# Patient Record
Sex: MALE | Race: WHITE | NOT HISPANIC OR LATINO | Employment: UNEMPLOYED | URBAN - METROPOLITAN AREA
[De-identification: names, ages, dates, MRNs, and addresses within clinical notes are randomized per-mention and may not be internally consistent; named-entity substitution may affect disease eponyms.]

---

## 2017-01-03 ENCOUNTER — COMMUNICATION - HEALTHEAST (OUTPATIENT)
Dept: BEHAVIORAL HEALTH | Facility: CLINIC | Age: 62
End: 2017-01-03

## 2017-02-22 ENCOUNTER — COMMUNICATION - HEALTHEAST (OUTPATIENT)
Dept: BEHAVIORAL HEALTH | Facility: CLINIC | Age: 62
End: 2017-02-22

## 2017-02-22 DIAGNOSIS — R46.89 COGNITIVE AND BEHAVIORAL CHANGES: ICD-10-CM

## 2017-02-22 DIAGNOSIS — R41.89 COGNITIVE AND BEHAVIORAL CHANGES: ICD-10-CM

## 2017-03-08 ENCOUNTER — OFFICE VISIT - HEALTHEAST (OUTPATIENT)
Dept: BEHAVIORAL HEALTH | Facility: CLINIC | Age: 62
End: 2017-03-08

## 2017-03-08 DIAGNOSIS — F06.30 MOOD DISORDER DUE TO OLD HEAD INJURY: ICD-10-CM

## 2017-03-08 DIAGNOSIS — S09.90XS DEMENTIA DUE TO HEAD TRAUMA (H): ICD-10-CM

## 2017-03-08 DIAGNOSIS — F14.11 COCAINE ABUSE IN REMISSION (H): ICD-10-CM

## 2017-03-08 DIAGNOSIS — F10.11 ALCOHOL ABUSE, IN REMISSION: ICD-10-CM

## 2017-03-08 DIAGNOSIS — F02.80 DEMENTIA DUE TO HEAD TRAUMA (H): ICD-10-CM

## 2017-03-08 DIAGNOSIS — S09.90XS MOOD DISORDER DUE TO OLD HEAD INJURY: ICD-10-CM

## 2017-03-08 DIAGNOSIS — F60.2 ANTISOCIAL PERSONALITY DISORDER (H): ICD-10-CM

## 2017-03-15 ENCOUNTER — COMMUNICATION - HEALTHEAST (OUTPATIENT)
Dept: SCHEDULING | Facility: CLINIC | Age: 62
End: 2017-03-15

## 2017-03-15 ENCOUNTER — RECORDS - HEALTHEAST (OUTPATIENT)
Dept: ADMINISTRATIVE | Facility: OTHER | Age: 62
End: 2017-03-15

## 2017-03-27 ENCOUNTER — COMMUNICATION - HEALTHEAST (OUTPATIENT)
Dept: BEHAVIORAL HEALTH | Facility: CLINIC | Age: 62
End: 2017-03-27

## 2017-03-29 ENCOUNTER — HOSPITAL ENCOUNTER (OUTPATIENT)
Facility: CLINIC | Age: 62
Discharge: HOME | End: 2017-03-29
Attending: COLON & RECTAL SURGERY | Admitting: COLON & RECTAL SURGERY
Payer: COMMERCIAL

## 2017-03-29 ENCOUNTER — RECORDS - HEALTHEAST (OUTPATIENT)
Dept: ADMINISTRATIVE | Facility: OTHER | Age: 62
End: 2017-03-29

## 2017-03-29 ENCOUNTER — SURGERY (OUTPATIENT)
Age: 62
End: 2017-03-29

## 2017-03-29 ENCOUNTER — COMMUNICATION - HEALTHEAST (OUTPATIENT)
Dept: BEHAVIORAL HEALTH | Facility: CLINIC | Age: 62
End: 2017-03-29

## 2017-03-29 VITALS
HEIGHT: 72 IN | SYSTOLIC BLOOD PRESSURE: 129 MMHG | RESPIRATION RATE: 16 BRPM | BODY MASS INDEX: 21.67 KG/M2 | OXYGEN SATURATION: 95 % | DIASTOLIC BLOOD PRESSURE: 88 MMHG | WEIGHT: 160 LBS

## 2017-03-29 DIAGNOSIS — F06.30 MOOD DISORDER IN CONDITIONS CLASSIFIED ELSEWHERE: ICD-10-CM

## 2017-03-29 LAB — COLONOSCOPY: NORMAL

## 2017-03-29 PROCEDURE — 45380 COLONOSCOPY AND BIOPSY: CPT | Performed by: COLON & RECTAL SURGERY

## 2017-03-29 PROCEDURE — 99153 MOD SED SAME PHYS/QHP EA: CPT | Performed by: COLON & RECTAL SURGERY

## 2017-03-29 PROCEDURE — 25000125 ZZHC RX 250: Performed by: COLON & RECTAL SURGERY

## 2017-03-29 PROCEDURE — 45385 COLONOSCOPY W/LESION REMOVAL: CPT | Mod: PT | Performed by: COLON & RECTAL SURGERY

## 2017-03-29 PROCEDURE — 88305 TISSUE EXAM BY PATHOLOGIST: CPT | Mod: 26 | Performed by: COLON & RECTAL SURGERY

## 2017-03-29 PROCEDURE — 88305 TISSUE EXAM BY PATHOLOGIST: CPT | Performed by: COLON & RECTAL SURGERY

## 2017-03-29 PROCEDURE — G0500 MOD SEDAT ENDO SERVICE >5YRS: HCPCS | Performed by: COLON & RECTAL SURGERY

## 2017-03-29 PROCEDURE — 25000128 H RX IP 250 OP 636: Performed by: COLON & RECTAL SURGERY

## 2017-03-29 RX ORDER — LIDOCAINE 40 MG/G
CREAM TOPICAL
Status: DISCONTINUED | OUTPATIENT
Start: 2017-03-29 | End: 2017-03-29 | Stop reason: HOSPADM

## 2017-03-29 RX ORDER — FLUMAZENIL 0.1 MG/ML
0.2 INJECTION, SOLUTION INTRAVENOUS
Status: DISCONTINUED | OUTPATIENT
Start: 2017-03-29 | End: 2017-03-29 | Stop reason: HOSPADM

## 2017-03-29 RX ORDER — NALOXONE HYDROCHLORIDE 0.4 MG/ML
.1-.4 INJECTION, SOLUTION INTRAMUSCULAR; INTRAVENOUS; SUBCUTANEOUS
Status: DISCONTINUED | OUTPATIENT
Start: 2017-03-29 | End: 2017-03-29 | Stop reason: HOSPADM

## 2017-03-29 RX ORDER — ONDANSETRON 2 MG/ML
4 INJECTION INTRAMUSCULAR; INTRAVENOUS EVERY 6 HOURS PRN
Status: DISCONTINUED | OUTPATIENT
Start: 2017-03-29 | End: 2017-03-29 | Stop reason: HOSPADM

## 2017-03-29 RX ORDER — FENTANYL CITRATE 50 UG/ML
INJECTION, SOLUTION INTRAMUSCULAR; INTRAVENOUS PRN
Status: DISCONTINUED | OUTPATIENT
Start: 2017-03-29 | End: 2017-03-29 | Stop reason: HOSPADM

## 2017-03-29 RX ORDER — ONDANSETRON 2 MG/ML
4 INJECTION INTRAMUSCULAR; INTRAVENOUS
Status: DISCONTINUED | OUTPATIENT
Start: 2017-03-29 | End: 2017-03-29 | Stop reason: HOSPADM

## 2017-03-29 RX ORDER — ONDANSETRON 4 MG/1
4 TABLET, ORALLY DISINTEGRATING ORAL EVERY 6 HOURS PRN
Status: DISCONTINUED | OUTPATIENT
Start: 2017-03-29 | End: 2017-03-29 | Stop reason: HOSPADM

## 2017-03-29 RX ORDER — PROCHLORPERAZINE MALEATE 5 MG
5-10 TABLET ORAL EVERY 6 HOURS PRN
Status: DISCONTINUED | OUTPATIENT
Start: 2017-03-29 | End: 2017-03-29 | Stop reason: HOSPADM

## 2017-03-29 RX ADMIN — MIDAZOLAM HYDROCHLORIDE 1 MG: 1 INJECTION, SOLUTION INTRAMUSCULAR; INTRAVENOUS at 08:13

## 2017-03-29 RX ADMIN — FENTANYL CITRATE 100 MCG: 50 INJECTION, SOLUTION INTRAMUSCULAR; INTRAVENOUS at 08:13

## 2017-03-29 NOTE — H&P
Pre-Endoscopy History and Physical     Tavon Arias MRN# [id number]   YOB: 1955 Age: 62 year old     Date of Procedure: 3/29/2017  Primary care provider: Trung Harden  Type of Endoscopy: colonoscopy  Reason for Procedure: screening, history of polyps  Type of Anesthesia Anticipated: moderate sedation    HPI:    Tavon is a 62 year old male who will be undergoing the above procedure.  Patient had a colonoscopy in 2014 during which a tubulovillous adenoma was removed; the prep was only fair at the time. He recently had a rubber band ligation for bleeding internal hemorrhoids. Patient denies bleeding since then. He denies a change in his bowel habits. Caregiver reports that he has had multiple day prep.    A history and physical has been performed. The patient's medications and allergies have been reviewed. The risks and benefits of the procedure and the sedation options and risks were discussed with the patient.  All questions were answered and informed consent was obtained.      He denies a personal or family history of anesthesia complications or bleeding disorders.     Prior to Admission medications    Medication Sig Start Date End Date Taking? Authorizing Provider   Acetaminophen (TYLENOL PO)     Reported, Patient   Ipratropium-Albuterol (COMBIVENT RESPIMAT)  MCG/ACT inhaler Inhale 1 puff into the lungs 4 times daily    Reported, Patient   ASPIRIN PO Take 81 mg by mouth    Reported, Patient   BENZTROPINE MESYLATE PO     Reported, Patient   bisacodyl (DULCOLAX) 5 MG EC tablet Take 5 mg by mouth daily as needed for constipation    Reported, Patient   CITALOPRAM HYDROBROMIDE PO     Reported, Patient   CLONAZEPAM PO     Reported, Patient   ketoconazole (NIZORAL) 2 % cream Apply topically daily    Reported, Patient   levETIRAcetam (KEPPRA) 100 MG/ML solution Take 10 mg/kg by mouth 2 times daily    Reported, Patient   LOSARTAN POTASSIUM PO     Reported, Patient   METFORMIN HCL PO     Reported,  Patient   magnesium citrate solution Take 296 mLs by mouth once    Reported, Patient   OMEPRAZOLE PO     Reported, Patient   oxybutynin (DITROPAN-XL) 5 MG 24 hr tablet Take by mouth daily    Reported, Patient   calcium citrate-vitamin D (CITRACAL) 315-250 MG-UNIT TABS Take by mouth daily    Reported, Patient   polyethylene glycol (MIRALAX/GLYCOLAX) powder Take 1 capful by mouth daily    Reported, Patient   albuterol (PROAIR HFA, PROVENTIL HFA, VENTOLIN HFA) 108 (90 BASE) MCG/ACT inhaler Inhale 2 puffs into the lungs every 6 hours    Reported, Patient   risperiDONE (RISPERDAL) 1 MG/ML oral solution Take 0.5 mg by mouth At Bedtime    Reported, Patient   senna-docusate (SENOKOT-S;PERICOLACE) 8.6-50 MG per tablet Take 1 tablet by mouth daily    Reported, Patient   TRAMADOL HCL PO     Reported, Patient   triamcinolone (KENALOG) 0.025 % cream Apply topically 2 times daily    Reported, Patient       Allergies   Allergen Reactions     Adhesive Tape Swelling     Latex                  Past Medical History:   Diagnosis Date     Anoxic brain injury (H)      Atrial fibrillation (H)      Cardiac arrest (H)      Cocaine abuse      COPD (chronic obstructive pulmonary disease) (H)      Hepatitis A      Hypertension      Mood disorder (H)      Type 2 diabetes mellitus without complications (H)     Adenomatous colon polyp         Past Surgical History:   Procedure Laterality Date     CHOLECYSTECTOMY       COLONOSCOPY  4/22/2014    Procedure: COLONOSCOPY;  Surgeon: Familia Bain MD;  Location:  GI     colostomy and takedown         Social History   Substance Use Topics     Smoking status: Former Smoker     Smokeless tobacco: Not on file     Alcohol use No       History reviewed. No pertinent family history.    REVIEW OF SYSTEMS:     5 point ROS negative except as noted above in HPI, including Gen., Resp., CV, GI &  system review. Has memory issues; complains of hip and shoulder pain.      PHYSICAL EXAM:   BP (!) 127/94   Resp 16  Ht 1.829 m (6')  Wt 72.6 kg (160 lb)  SpO2 97%  BMI 21.7 kg/m2 Estimated body mass index is 21.7 kg/(m^2) as calculated from the following:    Height as of this encounter: 1.829 m (6').    Weight as of this encounter: 72.6 kg (160 lb).   GENERAL APPEARANCE: healthy  MENTAL STATUS: alert  AIRWAY EXAM: Mallampatti Class II (visualization of the soft palate, fauces, and uvula) missing front lower teeth  RESP: lungs clear to auscultation - no rales, rhonchi or wheezes  CV: regular rates and rhythm      DIAGNOSTICS:    Not indicated      IMPRESSION   ASA Class 3        PLAN:       Colonoscopy with possible polypectomy, possible biopsy. The indications, procedure and risks were explained to the patient who agrees to proceed.       The above has been forwarded to the consulting provider.      Signed Electronically by: Cassie Martin  March 29, 2017

## 2017-03-29 NOTE — BRIEF OP NOTE
[facility] Brief Operative Note    Pre-operative diagnosis: SCREENING   Post-operative diagnosis colon polyps     Procedure: Procedure(s):  COLONOSCOPY - Wound Class: II-Clean Contaminated   - Wound Class: II-Clean Contaminated   Surgeon(s): Surgeon(s) and Role:     * Cassie Martin MD - Primary   Estimated blood loss: * No values recorded between 3/29/2017 12:00 AM and 3/29/2017  8:55 AM *    Specimens:   ID Type Source Tests Collected by Time Destination   A : ascending colon polyp biopsy Biopsy Large Intestine, Right/Ascending SURGICAL PATHOLOGY EXAM Cassie Martin MD 3/29/2017  8:25 AM    B : rectal polyp Polyp Large Intestine, Rectum SURGICAL PATHOLOGY EXAM Cassie Martin MD 3/29/2017  8:34 AM       Findings: See Provation procedure note in Epic

## 2017-03-30 ENCOUNTER — COMMUNICATION - HEALTHEAST (OUTPATIENT)
Dept: INTERNAL MEDICINE | Facility: CLINIC | Age: 62
End: 2017-03-30

## 2017-03-30 DIAGNOSIS — F06.30 MOOD DISORDER IN CONDITIONS CLASSIFIED ELSEWHERE: ICD-10-CM

## 2017-03-30 LAB — COPATH REPORT: NORMAL

## 2017-04-04 ENCOUNTER — RECORDS - HEALTHEAST (OUTPATIENT)
Dept: ADMINISTRATIVE | Facility: OTHER | Age: 62
End: 2017-04-04

## 2017-04-06 ENCOUNTER — RECORDS - HEALTHEAST (OUTPATIENT)
Dept: ADMINISTRATIVE | Facility: OTHER | Age: 62
End: 2017-04-06

## 2017-04-12 ENCOUNTER — RECORDS - HEALTHEAST (OUTPATIENT)
Dept: ADMINISTRATIVE | Facility: OTHER | Age: 62
End: 2017-04-12

## 2017-04-24 ENCOUNTER — RECORDS - HEALTHEAST (OUTPATIENT)
Dept: ADMINISTRATIVE | Facility: OTHER | Age: 62
End: 2017-04-24

## 2017-05-11 ENCOUNTER — COMMUNICATION - HEALTHEAST (OUTPATIENT)
Dept: SCHEDULING | Facility: CLINIC | Age: 62
End: 2017-05-11

## 2017-06-07 ENCOUNTER — OFFICE VISIT - HEALTHEAST (OUTPATIENT)
Dept: BEHAVIORAL HEALTH | Facility: CLINIC | Age: 62
End: 2017-06-07

## 2017-06-07 DIAGNOSIS — S09.90XS DEMENTIA DUE TO HEAD TRAUMA (H): ICD-10-CM

## 2017-06-07 DIAGNOSIS — F60.2 ANTISOCIAL PERSONALITY DISORDER (H): ICD-10-CM

## 2017-06-07 DIAGNOSIS — Z87.820 HISTORY OF TRAUMATIC BRAIN INJURY: ICD-10-CM

## 2017-06-07 DIAGNOSIS — F06.30 MOOD DISORDER DUE TO OLD HEAD INJURY: ICD-10-CM

## 2017-06-07 DIAGNOSIS — S09.90XS MOOD DISORDER DUE TO OLD HEAD INJURY: ICD-10-CM

## 2017-06-07 DIAGNOSIS — F10.21 ALCOHOL DEPENDENCE IN REMISSION (H): ICD-10-CM

## 2017-06-07 DIAGNOSIS — F02.80 DEMENTIA DUE TO HEAD TRAUMA (H): ICD-10-CM

## 2017-06-07 DIAGNOSIS — F14.21 COCAINE DEPENDENCE, IN REMISSION (H): ICD-10-CM

## 2017-08-17 ENCOUNTER — COMMUNICATION - HEALTHEAST (OUTPATIENT)
Dept: BEHAVIORAL HEALTH | Facility: CLINIC | Age: 62
End: 2017-08-17

## 2017-08-17 DIAGNOSIS — R46.89 COGNITIVE AND BEHAVIORAL CHANGES: ICD-10-CM

## 2017-08-17 DIAGNOSIS — R41.89 COGNITIVE AND BEHAVIORAL CHANGES: ICD-10-CM

## 2017-08-30 ENCOUNTER — RECORDS - HEALTHEAST (OUTPATIENT)
Dept: ADMINISTRATIVE | Facility: OTHER | Age: 62
End: 2017-08-30

## 2017-09-06 ENCOUNTER — OFFICE VISIT - HEALTHEAST (OUTPATIENT)
Dept: BEHAVIORAL HEALTH | Facility: CLINIC | Age: 62
End: 2017-09-06

## 2017-09-06 DIAGNOSIS — F06.30 MOOD DISORDER DUE TO OLD HEAD INJURY: ICD-10-CM

## 2017-09-06 DIAGNOSIS — R41.89 COGNITIVE AND BEHAVIORAL CHANGES: ICD-10-CM

## 2017-09-06 DIAGNOSIS — F06.30 MOOD DISORDER IN CONDITIONS CLASSIFIED ELSEWHERE: ICD-10-CM

## 2017-09-06 DIAGNOSIS — S09.90XS MOOD DISORDER DUE TO OLD HEAD INJURY: ICD-10-CM

## 2017-09-06 DIAGNOSIS — F14.11 COCAINE ABUSE IN REMISSION (H): ICD-10-CM

## 2017-09-06 DIAGNOSIS — S09.90XS DEMENTIA DUE TO HEAD TRAUMA (H): ICD-10-CM

## 2017-09-06 DIAGNOSIS — F02.80 DEMENTIA DUE TO HEAD TRAUMA (H): ICD-10-CM

## 2017-09-06 DIAGNOSIS — F60.2 ANTISOCIAL PERSONALITY DISORDER (H): ICD-10-CM

## 2017-09-06 DIAGNOSIS — F14.21 COCAINE DEPENDENCE, IN REMISSION (H): ICD-10-CM

## 2017-09-06 DIAGNOSIS — F10.21 ALCOHOL DEPENDENCE IN REMISSION (H): ICD-10-CM

## 2017-09-06 DIAGNOSIS — R46.89 COGNITIVE AND BEHAVIORAL CHANGES: ICD-10-CM

## 2017-10-04 ENCOUNTER — OFFICE VISIT - HEALTHEAST (OUTPATIENT)
Dept: BEHAVIORAL HEALTH | Facility: CLINIC | Age: 62
End: 2017-10-04

## 2017-10-04 DIAGNOSIS — F06.30 MOOD DISORDER DUE TO OLD HEAD INJURY: ICD-10-CM

## 2017-10-04 DIAGNOSIS — F02.80 DEMENTIA DUE TO HEAD TRAUMA (H): ICD-10-CM

## 2017-10-04 DIAGNOSIS — F10.21 ALCOHOL DEPENDENCE IN REMISSION (H): ICD-10-CM

## 2017-10-04 DIAGNOSIS — F11.21 OPIOID DEPENDENCE IN REMISSION (H): ICD-10-CM

## 2017-10-04 DIAGNOSIS — F14.11 COCAINE ABUSE IN REMISSION (H): ICD-10-CM

## 2017-10-04 DIAGNOSIS — F60.2 ANTISOCIAL PERSONALITY DISORDER (H): ICD-10-CM

## 2017-10-04 DIAGNOSIS — S09.90XS MOOD DISORDER DUE TO OLD HEAD INJURY: ICD-10-CM

## 2017-10-04 DIAGNOSIS — Z87.820 HISTORY OF TRAUMATIC BRAIN INJURY: ICD-10-CM

## 2017-10-04 DIAGNOSIS — S09.90XS DEMENTIA DUE TO HEAD TRAUMA (H): ICD-10-CM

## 2017-11-26 ENCOUNTER — COMMUNICATION - HEALTHEAST (OUTPATIENT)
Dept: SCHEDULING | Facility: CLINIC | Age: 62
End: 2017-11-26

## 2017-11-26 DIAGNOSIS — G40.909 EPILEPSY (H): ICD-10-CM

## 2018-02-02 ENCOUNTER — COMMUNICATION - HEALTHEAST (OUTPATIENT)
Dept: SCHEDULING | Facility: CLINIC | Age: 63
End: 2018-02-02

## 2018-02-02 DIAGNOSIS — G40.909 EPILEPSY (H): ICD-10-CM

## 2018-02-20 ENCOUNTER — OFFICE VISIT - HEALTHEAST (OUTPATIENT)
Dept: BEHAVIORAL HEALTH | Facility: CLINIC | Age: 63
End: 2018-02-20

## 2018-02-20 DIAGNOSIS — F14.11 COCAINE ABUSE IN REMISSION (H): ICD-10-CM

## 2018-02-20 DIAGNOSIS — R41.89 COGNITIVE AND BEHAVIORAL CHANGES: ICD-10-CM

## 2018-02-20 DIAGNOSIS — F60.2 ANTISOCIAL PERSONALITY DISORDER (H): ICD-10-CM

## 2018-02-20 DIAGNOSIS — F06.30 MOOD DISORDER IN CONDITIONS CLASSIFIED ELSEWHERE: ICD-10-CM

## 2018-02-20 DIAGNOSIS — F11.21 OPIOID DEPENDENCE IN REMISSION (H): ICD-10-CM

## 2018-02-20 DIAGNOSIS — F02.80 DEMENTIA DUE TO HEAD TRAUMA (H): ICD-10-CM

## 2018-02-20 DIAGNOSIS — S09.90XS DEMENTIA DUE TO HEAD TRAUMA (H): ICD-10-CM

## 2018-02-20 DIAGNOSIS — F06.30 MOOD DISORDER DUE TO OLD HEAD INJURY: ICD-10-CM

## 2018-02-20 DIAGNOSIS — Z87.820 HISTORY OF TRAUMATIC BRAIN INJURY: ICD-10-CM

## 2018-02-20 DIAGNOSIS — S09.90XS MOOD DISORDER DUE TO OLD HEAD INJURY: ICD-10-CM

## 2018-02-20 DIAGNOSIS — F10.21 ALCOHOL DEPENDENCE IN REMISSION (H): ICD-10-CM

## 2018-02-20 DIAGNOSIS — R46.89 COGNITIVE AND BEHAVIORAL CHANGES: ICD-10-CM

## 2018-03-06 ENCOUNTER — COMMUNICATION - HEALTHEAST (OUTPATIENT)
Dept: SCHEDULING | Facility: CLINIC | Age: 63
End: 2018-03-06

## 2018-03-06 DIAGNOSIS — G40.909 EPILEPSY (H): ICD-10-CM

## 2018-04-11 ENCOUNTER — COMMUNICATION - HEALTHEAST (OUTPATIENT)
Dept: BEHAVIORAL HEALTH | Facility: CLINIC | Age: 63
End: 2018-04-11

## 2018-04-13 ENCOUNTER — COMMUNICATION - HEALTHEAST (OUTPATIENT)
Dept: SCHEDULING | Facility: CLINIC | Age: 63
End: 2018-04-13

## 2018-04-20 ENCOUNTER — COMMUNICATION - HEALTHEAST (OUTPATIENT)
Dept: INTERNAL MEDICINE | Facility: CLINIC | Age: 63
End: 2018-04-20

## 2018-06-19 ENCOUNTER — OFFICE VISIT - HEALTHEAST (OUTPATIENT)
Dept: BEHAVIORAL HEALTH | Facility: CLINIC | Age: 63
End: 2018-06-19

## 2018-06-19 DIAGNOSIS — F10.21 ALCOHOL DEPENDENCE IN REMISSION (H): ICD-10-CM

## 2018-06-19 DIAGNOSIS — F02.80 DEMENTIA DUE TO HEAD TRAUMA (H): ICD-10-CM

## 2018-06-19 DIAGNOSIS — F60.2 ANTISOCIAL PERSONALITY DISORDER (H): ICD-10-CM

## 2018-06-19 DIAGNOSIS — F14.11 COCAINE ABUSE IN REMISSION (H): ICD-10-CM

## 2018-06-19 DIAGNOSIS — F06.30 MOOD DISORDER DUE TO OLD HEAD INJURY: ICD-10-CM

## 2018-06-19 DIAGNOSIS — S09.90XS DEMENTIA DUE TO HEAD TRAUMA (H): ICD-10-CM

## 2018-06-19 DIAGNOSIS — S09.90XS MOOD DISORDER DUE TO OLD HEAD INJURY: ICD-10-CM

## 2018-06-26 ENCOUNTER — COMMUNICATION - HEALTHEAST (OUTPATIENT)
Dept: BEHAVIORAL HEALTH | Facility: CLINIC | Age: 63
End: 2018-06-26

## 2018-10-03 ENCOUNTER — OFFICE VISIT - HEALTHEAST (OUTPATIENT)
Dept: BEHAVIORAL HEALTH | Facility: CLINIC | Age: 63
End: 2018-10-03

## 2018-10-03 DIAGNOSIS — S09.90XS MOOD DISORDER DUE TO OLD HEAD INJURY: ICD-10-CM

## 2018-10-03 DIAGNOSIS — F02.80 DEMENTIA DUE TO HEAD TRAUMA (H): ICD-10-CM

## 2018-10-03 DIAGNOSIS — F14.11 COCAINE ABUSE IN REMISSION (H): ICD-10-CM

## 2018-10-03 DIAGNOSIS — F06.30 MOOD DISORDER DUE TO OLD HEAD INJURY: ICD-10-CM

## 2018-10-03 DIAGNOSIS — R41.89 COGNITIVE AND BEHAVIORAL CHANGES: ICD-10-CM

## 2018-10-03 DIAGNOSIS — S09.90XS DEMENTIA DUE TO HEAD TRAUMA (H): ICD-10-CM

## 2018-10-03 DIAGNOSIS — R46.89 COGNITIVE AND BEHAVIORAL CHANGES: ICD-10-CM

## 2018-10-03 DIAGNOSIS — F60.2 ANTISOCIAL PERSONALITY DISORDER (H): ICD-10-CM

## 2018-10-03 DIAGNOSIS — F06.30 MOOD DISORDER IN CONDITIONS CLASSIFIED ELSEWHERE: ICD-10-CM

## 2018-10-03 ASSESSMENT — MIFFLIN-ST. JEOR: SCORE: 1698.91

## 2018-12-05 ENCOUNTER — COMMUNICATION - HEALTHEAST (OUTPATIENT)
Dept: SCHEDULING | Facility: CLINIC | Age: 63
End: 2018-12-05

## 2018-12-26 ENCOUNTER — OFFICE VISIT - HEALTHEAST (OUTPATIENT)
Dept: BEHAVIORAL HEALTH | Facility: CLINIC | Age: 63
End: 2018-12-26

## 2018-12-26 DIAGNOSIS — F06.30 MOOD DISORDER DUE TO OLD HEAD INJURY: ICD-10-CM

## 2018-12-26 DIAGNOSIS — S09.90XS MOOD DISORDER DUE TO OLD HEAD INJURY: ICD-10-CM

## 2018-12-26 DIAGNOSIS — Z87.820 HISTORY OF TRAUMATIC BRAIN INJURY: ICD-10-CM

## 2018-12-26 DIAGNOSIS — F60.2 ANTISOCIAL PERSONALITY DISORDER (H): ICD-10-CM

## 2018-12-26 DIAGNOSIS — F14.11 COCAINE ABUSE IN REMISSION (H): ICD-10-CM

## 2018-12-26 DIAGNOSIS — F10.21 ALCOHOL DEPENDENCE IN REMISSION (H): ICD-10-CM

## 2018-12-26 DIAGNOSIS — F02.80 DEMENTIA DUE TO HEAD TRAUMA (H): ICD-10-CM

## 2018-12-26 DIAGNOSIS — S09.90XS DEMENTIA DUE TO HEAD TRAUMA (H): ICD-10-CM

## 2018-12-26 ASSESSMENT — MIFFLIN-ST. JEOR: SCORE: 1698.91

## 2019-03-20 ENCOUNTER — OFFICE VISIT - HEALTHEAST (OUTPATIENT)
Dept: BEHAVIORAL HEALTH | Facility: CLINIC | Age: 64
End: 2019-03-20

## 2019-03-20 DIAGNOSIS — S09.90XS MOOD DISORDER DUE TO OLD HEAD INJURY: ICD-10-CM

## 2019-03-20 DIAGNOSIS — R41.89 COGNITIVE AND BEHAVIORAL CHANGES: ICD-10-CM

## 2019-03-20 DIAGNOSIS — R46.89 COGNITIVE AND BEHAVIORAL CHANGES: ICD-10-CM

## 2019-03-20 DIAGNOSIS — F14.11 COCAINE ABUSE IN REMISSION (H): ICD-10-CM

## 2019-03-20 DIAGNOSIS — F06.30 MOOD DISORDER IN CONDITIONS CLASSIFIED ELSEWHERE: ICD-10-CM

## 2019-03-20 DIAGNOSIS — F06.30 MOOD DISORDER DUE TO OLD HEAD INJURY: ICD-10-CM

## 2019-03-20 DIAGNOSIS — F10.21 ALCOHOL DEPENDENCE IN REMISSION (H): ICD-10-CM

## 2019-03-20 ASSESSMENT — MIFFLIN-ST. JEOR: SCORE: 1635.4

## 2019-04-17 ENCOUNTER — OFFICE VISIT - HEALTHEAST (OUTPATIENT)
Dept: BEHAVIORAL HEALTH | Facility: CLINIC | Age: 64
End: 2019-04-17

## 2019-04-17 DIAGNOSIS — S09.90XS MOOD DISORDER DUE TO OLD HEAD INJURY: ICD-10-CM

## 2019-04-17 DIAGNOSIS — F06.30 MOOD DISORDER DUE TO OLD HEAD INJURY: ICD-10-CM

## 2019-07-11 ENCOUNTER — OFFICE VISIT - HEALTHEAST (OUTPATIENT)
Dept: BEHAVIORAL HEALTH | Facility: CLINIC | Age: 64
End: 2019-07-11

## 2019-07-11 DIAGNOSIS — F14.11 COCAINE ABUSE IN REMISSION (H): ICD-10-CM

## 2019-07-11 DIAGNOSIS — S09.90XS MOOD DISORDER DUE TO OLD HEAD INJURY: ICD-10-CM

## 2019-07-11 DIAGNOSIS — F06.30 MOOD DISORDER DUE TO OLD HEAD INJURY: ICD-10-CM

## 2019-07-11 DIAGNOSIS — F10.21 ALCOHOL DEPENDENCE IN REMISSION (H): ICD-10-CM

## 2019-09-20 ENCOUNTER — COMMUNICATION - HEALTHEAST (OUTPATIENT)
Dept: BEHAVIORAL HEALTH | Facility: CLINIC | Age: 64
End: 2019-09-20

## 2019-09-20 DIAGNOSIS — S09.90XS MOOD DISORDER DUE TO OLD HEAD INJURY: ICD-10-CM

## 2019-09-20 DIAGNOSIS — F06.30 MOOD DISORDER DUE TO OLD HEAD INJURY: ICD-10-CM

## 2019-09-20 DIAGNOSIS — F06.30 MOOD DISORDER IN CONDITIONS CLASSIFIED ELSEWHERE: ICD-10-CM

## 2019-10-15 ENCOUNTER — OFFICE VISIT - HEALTHEAST (OUTPATIENT)
Dept: BEHAVIORAL HEALTH | Facility: CLINIC | Age: 64
End: 2019-10-15

## 2019-10-15 DIAGNOSIS — F06.30 MOOD DISORDER DUE TO OLD HEAD INJURY: ICD-10-CM

## 2019-10-15 DIAGNOSIS — S09.90XS DEMENTIA DUE TO HEAD TRAUMA WITHOUT BEHAVIORAL DISTURBANCE (H): ICD-10-CM

## 2019-10-15 DIAGNOSIS — R46.89 COGNITIVE AND BEHAVIORAL CHANGES: ICD-10-CM

## 2019-10-15 DIAGNOSIS — F14.11 COCAINE ABUSE IN REMISSION (H): ICD-10-CM

## 2019-10-15 DIAGNOSIS — R41.89 COGNITIVE AND BEHAVIORAL CHANGES: ICD-10-CM

## 2019-10-15 DIAGNOSIS — S09.90XS MOOD DISORDER DUE TO OLD HEAD INJURY: ICD-10-CM

## 2019-10-15 DIAGNOSIS — F02.80 DEMENTIA DUE TO HEAD TRAUMA WITHOUT BEHAVIORAL DISTURBANCE (H): ICD-10-CM

## 2019-10-15 DIAGNOSIS — F06.30 MOOD DISORDER IN CONDITIONS CLASSIFIED ELSEWHERE: ICD-10-CM

## 2019-10-15 DIAGNOSIS — F10.21 ALCOHOL DEPENDENCE IN REMISSION (H): ICD-10-CM

## 2020-01-24 ENCOUNTER — COMMUNICATION - HEALTHEAST (OUTPATIENT)
Dept: BEHAVIORAL HEALTH | Facility: CLINIC | Age: 65
End: 2020-01-24

## 2020-03-09 ENCOUNTER — COMMUNICATION - HEALTHEAST (OUTPATIENT)
Dept: BEHAVIORAL HEALTH | Facility: CLINIC | Age: 65
End: 2020-03-09

## 2020-03-09 DIAGNOSIS — F06.30 MOOD DISORDER DUE TO OLD HEAD INJURY: ICD-10-CM

## 2020-03-09 DIAGNOSIS — S09.90XS MOOD DISORDER DUE TO OLD HEAD INJURY: ICD-10-CM

## 2020-04-07 ENCOUNTER — COMMUNICATION - HEALTHEAST (OUTPATIENT)
Dept: BEHAVIORAL HEALTH | Facility: CLINIC | Age: 65
End: 2020-04-07

## 2020-04-07 DIAGNOSIS — S09.90XS MOOD DISORDER DUE TO OLD HEAD INJURY: ICD-10-CM

## 2020-04-07 DIAGNOSIS — F06.30 MOOD DISORDER IN CONDITIONS CLASSIFIED ELSEWHERE: ICD-10-CM

## 2020-04-07 DIAGNOSIS — R41.89 COGNITIVE AND BEHAVIORAL CHANGES: ICD-10-CM

## 2020-04-07 DIAGNOSIS — R46.89 COGNITIVE AND BEHAVIORAL CHANGES: ICD-10-CM

## 2020-04-07 DIAGNOSIS — F06.30 MOOD DISORDER DUE TO OLD HEAD INJURY: ICD-10-CM

## 2020-04-14 ENCOUNTER — OFFICE VISIT - HEALTHEAST (OUTPATIENT)
Dept: BEHAVIORAL HEALTH | Facility: CLINIC | Age: 65
End: 2020-04-14

## 2020-04-14 DIAGNOSIS — F14.11 COCAINE ABUSE IN REMISSION (H): ICD-10-CM

## 2020-04-14 DIAGNOSIS — F43.23 ADJUSTMENT DISORDER WITH MIXED ANXIETY AND DEPRESSED MOOD: ICD-10-CM

## 2020-04-14 DIAGNOSIS — S09.90XS MOOD DISORDER DUE TO OLD HEAD INJURY: ICD-10-CM

## 2020-04-14 DIAGNOSIS — F10.21 ALCOHOL DEPENDENCE IN REMISSION (H): ICD-10-CM

## 2020-04-14 DIAGNOSIS — F02.80 DEMENTIA DUE TO HEAD TRAUMA WITHOUT BEHAVIORAL DISTURBANCE (H): ICD-10-CM

## 2020-04-14 DIAGNOSIS — R29.818 EXTRAPYRAMIDAL SYMPTOM: ICD-10-CM

## 2020-04-14 DIAGNOSIS — F06.30 MOOD DISORDER DUE TO OLD HEAD INJURY: ICD-10-CM

## 2020-04-14 DIAGNOSIS — S09.90XS DEMENTIA DUE TO HEAD TRAUMA WITHOUT BEHAVIORAL DISTURBANCE (H): ICD-10-CM

## 2020-08-05 ENCOUNTER — OFFICE VISIT - HEALTHEAST (OUTPATIENT)
Dept: BEHAVIORAL HEALTH | Facility: CLINIC | Age: 65
End: 2020-08-05

## 2020-08-05 DIAGNOSIS — S09.90XS MOOD DISORDER DUE TO OLD HEAD INJURY: ICD-10-CM

## 2020-08-05 DIAGNOSIS — F43.23 ADJUSTMENT DISORDER WITH MIXED ANXIETY AND DEPRESSED MOOD: ICD-10-CM

## 2020-08-05 DIAGNOSIS — F06.30 MOOD DISORDER DUE TO OLD HEAD INJURY: ICD-10-CM

## 2020-08-05 DIAGNOSIS — R29.818 EXTRAPYRAMIDAL SYMPTOM: ICD-10-CM

## 2020-08-05 DIAGNOSIS — F02.80 DEMENTIA DUE TO HEAD TRAUMA WITHOUT BEHAVIORAL DISTURBANCE (H): ICD-10-CM

## 2020-08-05 DIAGNOSIS — S09.90XS DEMENTIA DUE TO HEAD TRAUMA WITHOUT BEHAVIORAL DISTURBANCE (H): ICD-10-CM

## 2020-10-05 ENCOUNTER — COMMUNICATION - HEALTHEAST (OUTPATIENT)
Dept: BEHAVIORAL HEALTH | Facility: CLINIC | Age: 65
End: 2020-10-05

## 2020-10-05 DIAGNOSIS — R29.818 EXTRAPYRAMIDAL SYMPTOM: ICD-10-CM

## 2020-10-05 DIAGNOSIS — R46.89 COGNITIVE AND BEHAVIORAL CHANGES: ICD-10-CM

## 2020-10-05 DIAGNOSIS — F06.30 MOOD DISORDER IN CONDITIONS CLASSIFIED ELSEWHERE: ICD-10-CM

## 2020-10-05 DIAGNOSIS — R41.89 COGNITIVE AND BEHAVIORAL CHANGES: ICD-10-CM

## 2021-02-03 ENCOUNTER — OFFICE VISIT - HEALTHEAST (OUTPATIENT)
Dept: BEHAVIORAL HEALTH | Facility: CLINIC | Age: 66
End: 2021-02-03

## 2021-02-03 ENCOUNTER — COMMUNICATION - HEALTHEAST (OUTPATIENT)
Dept: BEHAVIORAL HEALTH | Facility: CLINIC | Age: 66
End: 2021-02-03

## 2021-02-03 DIAGNOSIS — F14.11 COCAINE ABUSE IN REMISSION (H): ICD-10-CM

## 2021-02-03 DIAGNOSIS — F43.23 ADJUSTMENT DISORDER WITH MIXED ANXIETY AND DEPRESSED MOOD: ICD-10-CM

## 2021-02-03 DIAGNOSIS — F02.80 DEMENTIA DUE TO HEAD TRAUMA WITHOUT BEHAVIORAL DISTURBANCE (H): ICD-10-CM

## 2021-02-03 DIAGNOSIS — F06.30 MOOD DISORDER DUE TO OLD HEAD INJURY: ICD-10-CM

## 2021-02-03 DIAGNOSIS — F10.21 ALCOHOL DEPENDENCE IN REMISSION (H): ICD-10-CM

## 2021-02-03 DIAGNOSIS — S09.90XS DEMENTIA DUE TO HEAD TRAUMA WITHOUT BEHAVIORAL DISTURBANCE (H): ICD-10-CM

## 2021-02-03 DIAGNOSIS — S09.90XS MOOD DISORDER DUE TO OLD HEAD INJURY: ICD-10-CM

## 2021-02-03 DIAGNOSIS — R29.818 EXTRAPYRAMIDAL SYMPTOM: ICD-10-CM

## 2021-04-28 ENCOUNTER — COMMUNICATION - HEALTHEAST (OUTPATIENT)
Dept: BEHAVIORAL HEALTH | Facility: CLINIC | Age: 66
End: 2021-04-28

## 2021-04-28 DIAGNOSIS — S09.90XS MOOD DISORDER DUE TO OLD HEAD INJURY: ICD-10-CM

## 2021-04-28 DIAGNOSIS — F06.30 MOOD DISORDER DUE TO OLD HEAD INJURY: ICD-10-CM

## 2021-05-27 NOTE — PROGRESS NOTES
Outpatient Psychiatric Progress Note    Date of visit: 4/17/2019         Discussion of Care and Treatment Recommendations:     Tavon is a 64 y.o. 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 He is a resident of the [facility]. He comes for this appointment accompanied by group home staff.    Group home staff member, the patient and I discussed treatment plan and these are  recommendations.     1.Patient will take the medications as prescribed.     Zoloft 150 mg every morning for depression.   Aricept  10 mg every am  Risperdal 0.5 mg twice a day  Cogentin 0.5mg 2 times a day  Trazodone 50 -100 mg  at night as needed   Patient will not stop taking medications or adjust them without consulting with the provider.    2.Staff will call with any problems between 2 visits.   3.[facility] staff will take the patient to the emergency room if not feeling safe  or unable to function in the community, or if suicidal, homicidal or hearing voices or having paranoia.   4. [facility] staff will watch his diet and exercise.   5.Patient will see non psychiatric providers for non psychiatric disorders.   6. Next appointment in 3 months  7.  staff will continue to encourage him to use a walker instead a wheelchair  8. Patient has conservator for financial decision making. Patient needs guardian for medical decision making.His sister should start guardianship process.          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   Cocaine abuse in full remission.   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, With Hallucinations     Chronic Obstructive Pulmonary Disease     Essential Hypertension     Headache     Type 2 Diabetes Mellitus - Uncomplicated, Controlled     Alcohol Dependence In Remission     Encephalopathy     Anoxic Brain Damage     Mood disorder due to old head trauma     Epilepsy and Recurrent Seizures     Chronic hepatitis C (H)     Opiate dependence, continuous (H)     Neuroleptic Consent signed on 10/13/15     Follicular lymphoma (H)             Chief Complaint / Subjective:      Chief complaint: response to medication and functioning in the community     History of Present Illness:   Rivera says that increased dose of Zoloft helped. He says he is not suicidal or homicidal. No delusions and hallucinations. He says sleep is good. Appetite is good. He says energy is low. He feels tired. He says she has chronic back and shoulder pain and headache. He gets acupuncture for it. He talks about his headache. It has been chronic, since head injury. He has neurologist. It is chronic that he wants opiates. He says he takes a shower regularly. Staff can help with his hygiene. He say he does not do much at home. He says he watches TV. He says he does not go to day program. He says he gets along with people ok.  He says he has no family support. He says his  staff is his main support. He lives in supervise setting. He does not use drugs and alcohol. He says he drinks coffee. It helps him with feeling less tired.  staff says he drinks a lot of coffee and it could contribute to headache.     From the past note:  Past psychiatric history:  Hospitalizations: 2 in 2006  Suicide attempt: Accidental overdose on heroin in 2006 and he had anoxic brain injury and chronic headaches.   ECT no  Guns:no  Chemical dependency history: Patient had 6 chemical  dependency treatments more than 10 years ago    Family history:   Psychiatric: Questionable  Suicide attempt: Questionable  Chemical dependency: Positive  Diabetes: Positive    Social history: Patient has a GED.    No abuse.    His parents  when he was 20 years old.    He was  and .    He has 2 children.  No contact.    He is on SSDI.  He lives in a group home.    He has conservator for financial decisions    Mental Status Examination today:   General: fair hygiene, cooperative  Orientation: Disoriented in time, oriented to self and partially to place  Speech: Impaired articulation due to TBI  Language: normal naming  Thought process: Gattman   Thought content: Denies hallucinations and delusions oncrete  Suicidal thoughts: Denies  Homicidal thoughts: Denies  Associations: Contracted t he says   Affect: brighter  Mood:depression improving  Intellectual functioning: Decrease due to TBI  Memory: Decreased due to TBI   Fund of knowledge: Decreased due to TBI   Attention and concentration: Decreased   Gait: Uses wheelchair a lot, but staff says he can walk with a walker  Psychomotor activity: No agitation  Muscles: No atrophy, no abnormal movements atrophy, no abnormal movements   InSight and judgment: Limited    Medication adherence: compliant  Medication side effects: absent  Information about medications: Discussed with group home staff     Psychotherapy: Basic day-to-day support.    Education: Diet, exercise, abstinence from drugs and alcohol,     Lab Results:   Personally reviewed     Lab Results   Component Value Date    WBC 3.3 (L) 02/09/2016    HGB 14.0 02/09/2016    HCT 41.4 02/09/2016     02/09/2016    CHOL 139 02/09/2016    TRIG 111 02/09/2016    HDL 37 (L) 02/09/2016    LDLDIRECT 91.0 06/28/2011    ALT 42 02/09/2016    AST 27 02/09/2016     02/09/2016    K 4.2 02/09/2016     02/09/2016    CREATININE 0.84 02/09/2016    BUN 15 02/09/2016    CO2 32 02/09/2016    TSH  "0.99 02/09/2016    PSA 0.77 06/24/2010    HGBA1C 5.9 02/09/2016    MICROALBUR <0.50 02/09/2016       Vital signs:  /75 (Patient Site: Right Arm, Patient Position: Sitting, Cuff Size: Adult Regular)   Pulse (!) 57   Ht 5' 10\" (1.778 m)   BMI 26.54 kg/m      Allergies: Adhesive and Latex         Medications:       Current Outpatient Medications on File Prior to Visit   Medication Sig     acetaminophen (TYLENOL) 500 MG tablet Take 1,000 mg by mouth every 6 (six) hours as needed for pain (Do not exceed 4000mg in 24 hour period).      aspirin 81 MG tablet Take 81 mg by mouth daily. Take with food at 8AM     benztropine (COGENTIN) 0.5 MG tablet TAKE 1 TABLET BY MOUTH TWICE DAILY     bisacodyl (DULCOLAX) 5 mg EC tablet Take 10 mg by mouth daily as needed for constipation.     blood glucose test (CONTOUR NEXT STRIPS) strips Test twice daily.  Pharmacy allowed to substitute per patient's insurance. Dx code: E11.9     blood-glucose meter (CONTOUR NEXT EZ METER) Misc Use as directed. Pharmacy allowed to substitute per patient's insurance. Dx code: E11.9     calcium-vitamin D 500 mg(1,250mg) -200 unit per tablet Take 1 tablet by mouth 2 (two) times a day with meals.     donepezil (ARICEPT) 10 MG tablet TAKE 1 TABLET BY MOUTH EVERY MORNING     ibuprofen (ADVIL,MOTRIN) 200 MG tablet Take 200 mg by mouth every 6 (six) hours as needed for pain.     lancets (SURE COMFORT LANCETS) 30 gauge Misc Test twice daily.  Pharmacy allowed to substitute per patient's insurance. Dx code: E11.9     levETIRAcetam (KEPPRA XR) 500 mg 24 hr tablet Take 1 tablet (500 mg total) by mouth every morning. (Patient taking differently: Take 500 mg by mouth 2 (two) times a day.       )     losartan (COZAAR) 50 MG tablet Take 25 mg by mouth daily with breakfast.            metFORMIN (GLUCOPHAGE-XR) 500 MG 24 hr tablet Take 1,000 mg by mouth 2 (two) times a day.            multivitamin (MULTIVITAMIN) per tablet Take 1 tablet by mouth daily.     " omeprazole (PRILOSEC) 20 MG capsule Take 20 mg by mouth daily.     ondansetron (ZOFRAN) 4 MG tablet Take 4 mg by mouth every 4 (four) hours as needed for nausea.     oxybutynin (DITROPAN XL) 5 MG ER tablet Take 2.5 mg by mouth 2 (two) times a day.     REGULOID, SUGAR FREE Powd Take 6 g by mouth daily.      risperiDONE (RISPERDAL) 0.5 MG tablet TAKE 1 TABLET BY MOUTH TWICE DAILY     sertraline (ZOLOFT) 100 MG tablet Take 1 and a half pill or 150 mg every am     topiramate (TOPAMAX) 25 MG tablet Take 25 mg by mouth.     traZODone (DESYREL) 50 MG tablet Take 1-2 tablets ( mg total) by mouth at bedtime as needed for sleep.     triamcinolone (KENALOG) 0.1 % cream Apply topically 2 (two) times a day.     [DISCONTINUED] SENNOSIDES/DOCUSATE SODIUM (SENEXON-S ORAL) Take 1 tablet by mouth 2 (two) times a day.     [DISCONTINUED] sertraline (ZOLOFT) 100 MG tablet Take 1 tablet (100 mg total) by mouth daily.     No current facility-administered medications on file prior to visit.           Review of Systems:    Chronic headaches and neck pain, otherwise as per HPI, reminder of review of systems is negative for; is general, eyes, ears, nose, throat, neck, respiratory, cardiovascular, gastrointestinal, genitourinary, meniscal skeletal, neurological, hematological, endocrine and dermatological system    Coordination of Care:   More than 25 minutes spent on this visit with more than 50% of time spent on: Coordination of care with nurse, discussing  care with group home staff , providing supportive therapy regarding above issues, discussing medications with group home staff and patient.     This note was created with the help of Dragon dictation system.  All grammatical/typing errors or context distortion  are unintentional.    Samia Hebert MD

## 2021-05-27 NOTE — PATIENT INSTRUCTIONS - HE
1.Patient will take the medications as prescribed.     Zoloft 150 mg every morning for depression.   Aricept  10 mg every am  Risperdal 0.5 mg twice a day  Cogentin 0.5mg 2 times a day  Trazodone 50 -100 mg  at night as needed   Patient will not stop taking medications or adjust them without consulting with the provider.    2.Staff will call with any problems between 2 visits.   3.nursing home staff will take the patient to the emergency room if not feeling safe  or unable to function in the community, or if suicidal, homicidal or hearing voices or having paranoia.   4. nursing home staff will watch his diet and exercise.   5.Patient will see non psychiatric providers for non psychiatric disorders.   6. Next appointment in 3 months  7.  staff will continue to encourage him to use a walker instead a wheelchair  8. Patient has conservator for financial decision making. Patient needs guardian for medical decision making.His sister should start guardianship process.

## 2021-05-27 NOTE — PROGRESS NOTES
Pt is here for routine follow up. Med list updated according to the treatment facility med list. Pt is complaining of headache and neck pain, staff has no new concerns today.  Correct pharmacy verified with patient and confirmed in snapshot? [x] yes []no    Charge captured ? [x] yes  [] no    Medications Phoned  to Pharmacy [] yes [x]no  Name of Pharmacist:  List Medications, including dose, quantity and instructions      Medication Prescriptions given to patient   [] yes  [x] no   List the name of the drug the prescription was written for.       Medications ordered this visit were e-scribed.  Verified by order class [] yes  [x] no    Medication changes or discontinuations were communicated to patient's pharmacy: [] yes  [x] no    UA collected [] yes  [x] no    [place] Prescription Monitoring Program Reviewed? [] yes  [x] no    Referrals were made to:  none  Future appointment was made: [x] yes  [] no  7/10/19  Dictation completed at time of chart check: [] yes  [x] no    I have checked the documentation for today s encounters and the above information has been reviewed and completed.

## 2021-05-30 VITALS — HEIGHT: 70 IN | BODY MASS INDEX: 28.55 KG/M2

## 2021-05-30 NOTE — PATIENT INSTRUCTIONS - HE
1.Patient will take the medications as prescribed.     Zoloft 150 mg every morning for depression.   Aricept  10 mg every am  Risperdal 0.5 mg twice a day  Cogentin 0.5mg 2 times a day  Trazodone 50 -100 mg  at night as needed   Patient will not stop taking medications or adjust them without consulting with the provider.    2.Staff will call with any problems between 2 visits.   3.skilled nursing staff will take the patient to the emergency room if not feeling safe  or unable to function in the community, or if suicidal, homicidal or hearing voices or having paranoia.   4. skilled nursing staff will watch his diet and exercise.   5.Patient will see non psychiatric providers for non psychiatric disorders.   6. Next appointment in 3 months  7.  staff will continue to encourage him to use a walker instead a wheelchair  8.  [facility].We need paper to know who to contact if [place] plan change.

## 2021-05-30 NOTE — PROGRESS NOTES
Outpatient Psychiatric Progress Note    Date of visit: 7/11/2019         Discussion of Care and Treatment Recommendations:     Tavon is a 64 y.o. 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 He is a resident of the [facility]. He comes for this appointment accompanied by group home staff.    Group home staff member, the patient and I discussed treatment plan and these are  recommendations.     1.Patient will take the medications as prescribed.     Zoloft 150 mg every morning for depression.   Aricept  10 mg every am  Risperdal 0.5 mg twice a day  Cogentin 0.5mg 2 times a day  Trazodone 50 -100 mg  at night as needed   Patient will not stop taking medications or adjust them without consulting with the provider.    2.Staff will call with any problems between 2 visits.   3.[facility] staff will take the patient to the emergency room if not feeling safe  or unable to function in the community, or if suicidal, homicidal or hearing voices or having paranoia.   4. [facility] staff will watch his diet and exercise.   5.Patient will see non psychiatric providers for non psychiatric disorders.   6. Next appointment in 3 months  7.  staff will continue to encourage him to use a walker instead a wheelchair  8.  staff says that [facility] has guardianship over the patient now.We need paper to know who to contact if [place] plan change.          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   Cocaine abuse in full remission.   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, With Hallucinations     Chronic Obstructive Pulmonary Disease     Essential Hypertension     Headache     Type 2 Diabetes Mellitus - Uncomplicated, Controlled     Alcohol Dependence In Remission     Encephalopathy     Anoxic Brain Damage     Mood disorder due to old head trauma     Epilepsy and Recurrent Seizures     Chronic hepatitis C (H)     Opiate dependence, continuous (H)     Neuroleptic Consent signed on 10/13/15     Follicular lymphoma (H)             Chief Complaint / Subjective:      Chief complaint: adjustment to another place where he was moved   Within the [place] systems, wants pain medications to feels numb     History of Present Illness: Patient is here with  staff. He was moved to another house.  He does not need a level of care that he had in previous house.  He will have more activities in the new place, because other clients are more functional.. He says he does not know the people there, but he says they are not arguing.  He is going through adjustment.  Group [place] staff says that he participated in the [employer] gathering on the weekend and he also watches movies with other clients.  Group [place] staff says that one client likes to cook and he tries to involve Tavon in his cooking sessions.  She says that [facility] has guardianship about  Tavon now.  I asked her to send us papers, because I would have to contact the guardian if there are changes in treatment plan.  He says that he functions better on increased dose of Zoloft.  He is more alert and he seems to have more energy.  He says that he feels depressed but he says he can tolerate that.  He says that he is not suicidal or homicidal.  He denies delusions or hallucinations.  He says he wants pain medications to feel numb, but not to kill himself.  He says that he has chronic head and neck pain.  He  drinks a lot of coffee and caffeine causes headache.  His primary doctor will not prescribe opioids due to his history of chemical dependency.  He says he sleeps through the night.  Appetite is okay.  He seems like he lost few pounds. He says he feels tired. Concentration is at baseline.  He says otherwise he feels okay.  He says he takes his medications.  He denies side effects.  Group homes staff reports that they do not have any problem with giving him medications.  He is generally cooperative.    From the past note:  Past psychiatric history:  Hospitalizations: 2 in 2006  Suicide attempt: Accidental overdose on heroin in 2006 and he had anoxic brain injury and chronic headaches.   ECT no  Guns:no  Chemical dependency history: Patient had 6 chemical dependency treatments more than 10 years ago    Family history:   Psychiatric: Questionable  Suicide attempt: Questionable  Chemical dependency: Positive  Diabetes: Positive    Social history: Patient has a GED.    No abuse.    His parents  when he was 20 years old.    He was  and .    He has 2 children.  No contact.    He is on SSDI.  He lives in a group home.    He has conservator for financial decisions    Mental Status Examination today:   General: fair hygiene, cooperative  Orientation: Oriented to self, completely disoriented in time and its chronic, partially oriented in place  Speech: Impaired articulation due to TBI  Language: normal naming  Thought process: [place]   Thought content: Denies hallucinations and delusions oncrete  Suicidal thoughts: Denies  Homicidal thoughts: Denies  Associations: Contracted t he says   Affect: Neutral  Mood: He says depressed, but it does not bother him  Intellectual functioning: Decreased due to TBI  Memory: Decreased due to TBI   Fund of knowledge: Decreased due to TBI   Attention and concentration: Seems to be at baseline  Gait: Uses walker at home , and wheelchair if needed  Psychomotor activity:  Calm, no agitation  Muscles: No atrophy, no abnormal movements atrophy, no abnormal movements   InSight and judgment: Limited    Medication adherence: compliant  Medication side effects: absent  Information about medications: Discussed with group home staff     Psychotherapy: Basic day-to-day support.    Education: Diet, exercise, abstinence from drugs and alcohol,     Discus:2 on 7/11/2019, next in 6 months     Lab Results:   Personally reviewed     Lab Results   Component Value Date    WBC 3.3 (L) 02/09/2016    HGB 14.0 02/09/2016    HCT 41.4 02/09/2016     02/09/2016    CHOL 139 02/09/2016    TRIG 111 02/09/2016    HDL 37 (L) 02/09/2016    LDLDIRECT 91.0 06/28/2011    ALT 42 02/09/2016    AST 27 02/09/2016     02/09/2016    K 4.2 02/09/2016     02/09/2016    CREATININE 0.84 02/09/2016    BUN 15 02/09/2016    CO2 32 02/09/2016    TSH 0.99 02/09/2016    PSA 0.77 06/24/2010    HGBA1C 5.9 02/09/2016    MICROALBUR <0.50 02/09/2016       Vital signs:  BP (!) 115/97 (Patient Site: Right Arm, Patient Position: Sitting, Cuff Size: Adult Regular)   Pulse (!) 56   Temp 97.5  F (36.4  C) (Oral)     Allergies: Adhesive and Latex         Medications:       Current Outpatient Medications on File Prior to Visit   Medication Sig     acetaminophen (TYLENOL) 500 MG tablet Take 500-1,000 mg by mouth every 12 (twelve) hours as needed for pain (Do not exceed 4000mg in 24 hour period).            aspirin 81 MG tablet Take 81 mg by mouth daily. Take with food at 8AM     benztropine (COGENTIN) 0.5 MG tablet TAKE 1 TABLET BY MOUTH TWICE DAILY     bisacodyl (DULCOLAX) 5 mg EC tablet Take 10 mg by mouth daily as needed for constipation.     blood glucose test (CONTOUR NEXT STRIPS) strips Test twice daily.  Pharmacy allowed to substitute per patient's insurance. Dx code: E11.9     blood-glucose meter (CONTOUR NEXT EZ METER) Misc Use as directed. Pharmacy allowed to substitute per patient's insurance. Dx code: E11.9      calcium-vitamin D 500 mg(1,250mg) -200 unit per tablet Take 1 tablet by mouth 2 (two) times a day with meals.     donepezil (ARICEPT) 10 MG tablet TAKE 1 TABLET BY MOUTH EVERY MORNING     ibuprofen (ADVIL,MOTRIN) 200 MG tablet Take 200-400 mg by mouth every 8 (eight) hours as needed for pain.            lancets (SURE COMFORT LANCETS) 30 gauge Misc Test twice daily.  Pharmacy allowed to substitute per patient's insurance. Dx code: E11.9     levETIRAcetam (KEPPRA XR) 500 mg 24 hr tablet Take 1 tablet (500 mg total) by mouth every morning. (Patient taking differently: Take 500 mg by mouth 2 (two) times a day.       )     losartan (COZAAR) 50 MG tablet Take 25 mg by mouth daily with breakfast.            metFORMIN (GLUCOPHAGE-XR) 500 MG 24 hr tablet Take 1,000 mg by mouth 2 (two) times a day.            multivitamin (MULTIVITAMIN) per tablet Take 1 tablet by mouth daily.     omeprazole (PRILOSEC) 20 MG capsule Take 20 mg by mouth daily.     ondansetron (ZOFRAN) 4 MG tablet Take 4 mg by mouth every 4 (four) hours as needed for nausea.     oxybutynin (DITROPAN XL) 5 MG ER tablet Take 2.5 mg by mouth 2 (two) times a day.     REGULOID, SUGAR FREE Powd Take 6 g by mouth daily.      risperiDONE (RISPERDAL) 0.5 MG tablet TAKE 1 TABLET BY MOUTH TWICE DAILY     sertraline (ZOLOFT) 100 MG tablet Take 1 and a half pill or 150 mg every am     topiramate (TOPAMAX) 25 MG tablet Take 25 mg by mouth 2 (two) times a day.            traZODone (DESYREL) 50 MG tablet Take 1-2 tablets ( mg total) by mouth at bedtime as needed for sleep.     triamcinolone (KENALOG) 0.1 % cream Apply topically 2 (two) times a day.     No current facility-administered medications on file prior to visit.           Review of Systems:    Chronic headache and neck pain,, otherwise per HPI, remainder of review of systems is negative for: General, eyes, ears, nose, throat, neck, respiratory, cardiovascular, gastrointestinal, genitourinary, meniscal skeletal,  neurological, hematological, endocrine and dermatological system    Coordination of Care:   More than 25 minutes spent on this visit with more than 50% of time spent on: Coordination of care with nurse, discussing  care with group home staff , providing supportive therapy regarding above issues, discussing medications with group home staff and patient.     This note was created with the help of Dragon dictation system.  All grammatical/typing errors or context distortion  are unintentional.    Samia Hebert MD

## 2021-05-30 NOTE — PROGRESS NOTES
Pt is here for psychiatric med management follow up.  Pt and staff has no new concerns roday. DISCUS last performed at  in December   reviewed, no records for the past year found    Correct pharmacy verified with patient and confirmed in snapshot? [x] yes []no    Charge captured ? [x] yes  [] no    Medications Phoned  to Pharmacy [] yes [x]no  Name of Pharmacist:  List Medications, including dose, quantity and instructions      Medication Prescriptions given to patient   [] yes  [x] no   List the name of the drug the prescription was written for.       Medications ordered this visit were e-scribed.  Verified by order class [] yes  [x] no    Medication changes or discontinuations were communicated to patient's pharmacy: [] yes  [x] no    UA collected [] yes  [x] no    [place] Prescription Monitoring Program Reviewed? [x] yes  [] no    Referrals were made to:  none  Future appointment was made: [x] yes  [] no  10/15/19  Dictation completed at time of chart check: [] yes  [x] no    I have checked the documentation for today s encounters and the above information has been reviewed and completed.

## 2021-05-31 VITALS — HEIGHT: 70 IN | BODY MASS INDEX: 22.96 KG/M2

## 2021-06-01 VITALS — HEIGHT: 70 IN | BODY MASS INDEX: 22.96 KG/M2

## 2021-06-01 NOTE — TELEPHONE ENCOUNTER
Date of Last Office Visit: 7/11/19  Date of Next Office Visit: 10/15/19  No shows since last visit: 0  Cancellations since last visit: 0  ED visits since last visit:  0  Medication sertraline 100 mg tablet date last ordered: 3/20/19  Qty: 45  Refills: 5  Medication cogentin 0.5 mg tablet date last ordered: 3/20/19  Qty: 60  Refills: 5  Lapse in therapy greater than 7 days: No  Medication refill request verified as identical to current order: Yes  Result of Last DAM, VPA, Li+ Level, CBC, or Carbamazepine Level (at or since last visit): NA     [] Medication refilled per [facility] M-1.   [] Medication unable to be refilled by RN due to criteria not met as indicated below:     []Eligibility - not seen in last year    []Supervision - no future appointment    []Compliance     []Verification - order discrepancy    []Controlled Medication    []Medication not included in RN Protocol    []90 - day supply request    [x]Other CMA pending medication refills     Current Medication list:    acetaminophen (TYLENOL) 500 MG tablet Take 500-1,000 mg by mouth every 12 (twelve) hours as needed for pain (Do not exceed 4000mg in 24 hour period).        aspirin 81 MG tablet Take 81 mg by mouth daily. Take with food at 8AM   benztropine (COGENTIN) 0.5 MG tablet TAKE 1 TABLET BY MOUTH TWICE DAILY   bisacodyl (DULCOLAX) 5 mg EC tablet Take 10 mg by mouth daily as needed for constipation.   blood glucose test (CONTOUR NEXT STRIPS) strips Test twice daily.  Pharmacy allowed to substitute per patient's insurance. Dx code: E11.9   blood-glucose meter (CONTOUR NEXT EZ METER) [facility] Use as directed. Pharmacy allowed to substitute per patient's insurance. Dx code: E11.9   calcium-vitamin D 500 mg(1,250mg) -200 unit per tablet Take 1 tablet by mouth 2 (two) times a day with meals.   donepezil (ARICEPT) 10 MG tablet TAKE 1 TABLET BY MOUTH EVERY MORNING   ibuprofen (ADVIL,MOTRIN) 200 MG tablet Take 200-400 mg by mouth every 8 (eight) hours as needed for pain.         lancets (SURE COMFORT LANCETS) 30 gauge Misc Test twice daily.  Pharmacy allowed to substitute per patient's insurance. Dx code: E11.9   levETIRAcetam (KEPPRA XR) 500 mg 24 hr tablet Take 1 tablet (500 mg total) by mouth every morning.   losartan (COZAAR) 50 MG tablet Take 25 mg by mouth daily with breakfast.        metFORMIN (GLUCOPHAGE-XR) 500 MG 24 hr tablet Take 1,000 mg by mouth 2 (two) times a day.        multivitamin (MULTIVITAMIN) per tablet Take 1 tablet by mouth daily.   omeprazole (PRILOSEC) 20 MG capsule Take 20 mg by mouth daily.   ondansetron (ZOFRAN) 4 MG tablet Take 4 mg by mouth every 4 (four) hours as needed for nausea.   oxybutynin (DITROPAN XL) 5 MG ER tablet Take 2.5 mg by mouth 2 (two) times a day.   REGULOID, SUGAR FREE Powd Take 6 g by mouth daily.    risperiDONE (RISPERDAL) 0.5 MG tablet TAKE 1 TABLET BY MOUTH TWICE DAILY   sertraline (ZOLOFT) 100 MG tablet Take 1 and a half pill or 150 mg every am   topiramate (TOPAMAX) 25 MG tablet Take 25 mg by mouth 2 (two) times a day.        traZODone (DESYREL) 50 MG tablet Take 1-2 tablets ( mg total) by mouth at bedtime as needed for sleep.   triamcinolone (KENALOG) 0.1 % cream Apply topically 2 (two) times a day.       Medication Plan of Care at last office visit with MD/CNP:  Dr. Hebert     1.Patient will take the medications as prescribed.      Zoloft 150 mg every morning for depression.   Aricept  10 mg every am  Risperdal 0.5 mg twice a day  Cogentin 0.5mg 2 times a day  Trazodone 50 -100 mg  at night as needed   Patient will not stop taking medications or adjust them without consulting with the provider.     2.Staff will call with any problems between 2 visits.   3.long term staff will take the patient to the emergency room if not feeling safe  or unable to function in the community, or if suicidal, homicidal or hearing voices or having paranoia.   4. long term staff will watch his diet and exercise.   5.Patient will see non  psychiatric providers for non psychiatric disorders.   6. Next appointment in 3 months  7.  staff will continue to encourage him to use a walker instead a wheelchair  8.  staff says that [facility] has guardianship over the patient now.We need paper to know who to contact if [place] plan change.

## 2021-06-02 VITALS — BODY MASS INDEX: 26.48 KG/M2 | HEIGHT: 70 IN | WEIGHT: 185 LBS

## 2021-06-02 VITALS — WEIGHT: 199 LBS | BODY MASS INDEX: 28.49 KG/M2 | HEIGHT: 70 IN

## 2021-06-02 VITALS — BODY MASS INDEX: 28.49 KG/M2 | HEIGHT: 70 IN | WEIGHT: 199 LBS

## 2021-06-02 NOTE — PROGRESS NOTES
Outpatient Psychiatric Progress Note    Date of visit: 10/15/2019         Discussion of Care and Treatment Recommendations:     Tavon is a 64 y.o. 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 He is a resident of the [facility]. He comes for this appointment accompanied by group home staff.    Group home staff member, the patient and I discussed treatment plan and these are  recommendations.     1.Patient will take the medications as prescribed.    Zoloft 150 mg every morning for depression.   Aricept  10 mg every am  Risperdal 0.5 mg twice a day  Cogentin 0.5mg 2 times a day  Trazodone 50 -100 mg  at night as needed   Patient will not stop taking medications or adjust them without consulting with the provider.    2.Staff will call with any problems between 2 visits.   3.[facility] staff will take the patient to the emergency room if not feeling safe  or unable to function in the community, or if suicidal, homicidal or hearing voices or having paranoia.   4. [facility] staff will watch his diet and exercise.   5.Patient will see non psychiatric providers for non psychiatric disorders.   6. Next appointment in 3 months  7.  staff will continue to encourage him to use a walker instead a wheelchair           DIagnoses:     Mood disorder due to traumatic brain injury.   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   Cocaine abuse in full remission.   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, With Hallucinations     Chronic Obstructive Pulmonary Disease     Essential Hypertension     Headache     Type 2 Diabetes Mellitus - Uncomplicated, Controlled     Alcohol Dependence In Remission     Encephalopathy     Anoxic Brain Damage     Mood disorder due to old head trauma     Epilepsy and Recurrent Seizures     Chronic hepatitis C (H)     Opiate dependence, continuous (H)     Neuroleptic Consent signed on 10/13/15     Follicular lymphoma (H)             Chief Complaint / Subjective:      Chief complaint: response to medication, functioning in the new      History of Present Illness: Tavon comes accompanied by  staff. staff  member says patient will start going to a different program. He did not participate in other program.He moved to a new  6 months ago. He says he likes it. Staff try to engage him. Clients are more functional and he does things with them. He spends more time out of his room. He says he is depressed , but it does not bother him.He says he sleeps ok. Appetite is good. Energy is sufficient for daily activities.Concentration is at baseline. He likes to watch TV and listen to the music.He denies suicidal and homicidal ideas, delusions and hallucinations.He says he always have aches and pains in his body. His family is not much involved. His sister is rep payee. He denies side effects of medications.  staff says he is his own guardian. He is  oriented to self. He is oriented to situation, not in time and place.He says he  forgets things, but not  more than usually.        From the past note:  Past psychiatric history:  Hospitalizations: 2 in 2006  Suicide attempt: Accidental overdose on heroin in 2006 and he had anoxic brain injury and chronic headaches.   ECT no  Guns:no  Chemical dependency history: Patient had 6 chemical dependency treatments more than 10 years ago    Family history:   Psychiatric: Questionable  Suicide attempt: Questionable  Chemical dependency:  Positive  Diabetes: Positive    Social history: Patient has a GED.    No abuse.    His parents  when he was 20 years old.    He was  and .    He has 2 children.  No contact.    He is on SSDI.  He lives in a group home.    He has conservator for financial decisions    Mental Status Examination today:   General: fair hygiene, cooperative  Orientation: Oriented to self, completely disoriented in time and its chronic, partially oriented in place  Speech: Impaired articulation due to TBI  Language: normal naming  Thought process: [place]   Thought content: Denies hallucinations and delusions   Suicidal thoughts: Denies  Homicidal thoughts: Denies  Associations: connected   Affect: Neutral  Mood: He says depressed, but it does not bother him  Intellectual functioning: Decreased due to TBI  Memory: Decreased due to TBI   Fund of knowledge: Decreased due to TBI   Attention and concentration: Seems to be at baseline  Gait: Uses walker at home , and wheelchair if needed  Psychomotor activity: Calm, no agitation  Muscles: No atrophy, no abnormal movements atrophy, no abnormal movements   InSight and judgment: Limited    Medication adherence: compliant  Medication side effects: absent  Information about medications: Discussed with group home staff     Psychotherapy: Basic day-to-day support.    Education: Diet, exercise, abstinence from drugs and alcohol,     Discus:2 on 7/11/2019, next in 6 months     Lab Results:   Personally reviewed     Lab Results   Component Value Date    WBC 3.3 (L) 02/09/2016    HGB 14.0 02/09/2016    HCT 41.4 02/09/2016     02/09/2016    CHOL 139 02/09/2016    TRIG 111 02/09/2016    HDL 37 (L) 02/09/2016    LDLDIRECT 91.0 06/28/2011    ALT 42 02/09/2016    AST 27 02/09/2016     02/09/2016    K 4.2 02/09/2016     02/09/2016    CREATININE 0.84 02/09/2016    BUN 15 02/09/2016    CO2 32 02/09/2016    TSH 0.99 02/09/2016    PSA 0.77 06/24/2010    HGBA1C 5.9  "02/09/2016    MICROALBUR <0.50 02/09/2016       Vital signs:  /73 (Patient Site: Right Arm, Patient Position: Sitting, Cuff Size: Adult Regular)   Pulse (!) 59   Temp 98.1  F (36.7  C) (Oral)   Ht 5' 10\" (1.778 m)   BMI 26.54 kg/m      Allergies: Adhesive and Latex         Medications:       Current Outpatient Medications on File Prior to Visit   Medication Sig     acetaminophen (TYLENOL) 500 MG tablet Take 500-1,000 mg by mouth every 12 (twelve) hours as needed for pain (Do not exceed 4000mg in 24 hour period).            aspirin 81 MG tablet Take 81 mg by mouth daily. Take with food at 8AM     benztropine (COGENTIN) 0.5 MG tablet TAKE 1 TABLET BY MOUTH TWICE DAILY     bisacodyl (DULCOLAX) 5 mg EC tablet Take 10 mg by mouth daily as needed for constipation.     blood glucose test (CONTOUR NEXT STRIPS) strips Test twice daily.  Pharmacy allowed to substitute per patient's insurance. Dx code: E11.9     blood-glucose meter (CONTOUR NEXT EZ METER) Misc Use as directed. Pharmacy allowed to substitute per patient's insurance. Dx code: E11.9     calcium-vitamin D 500 mg(1,250mg) -200 unit per tablet Take 1 tablet by mouth 2 (two) times a day with meals.     donepezil (ARICEPT) 10 MG tablet TAKE 1 TABLET BY MOUTH EVERY MORNING     ibuprofen (ADVIL,MOTRIN) 200 MG tablet Take 200-400 mg by mouth every 8 (eight) hours as needed for pain.            lancets (SURE COMFORT LANCETS) 30 gauge Misc Test twice daily.  Pharmacy allowed to substitute per patient's insurance. Dx code: E11.9     levETIRAcetam (KEPPRA XR) 500 mg 24 hr tablet Take 1 tablet (500 mg total) by mouth every morning. (Patient taking differently: Take 500 mg by mouth 2 (two) times a day.       )     losartan (COZAAR) 50 MG tablet Take 25 mg by mouth daily with breakfast.            metFORMIN (GLUCOPHAGE-XR) 500 MG 24 hr tablet Take 1,000 mg by mouth 2 (two) times a day.            multivitamin (MULTIVITAMIN) per tablet Take 1 tablet by mouth daily.     " omeprazole (PRILOSEC) 20 MG capsule Take 20 mg by mouth daily.     ondansetron (ZOFRAN) 4 MG tablet Take 4 mg by mouth every 4 (four) hours as needed for nausea.     oxybutynin (DITROPAN XL) 5 MG ER tablet Take 2.5 mg by mouth 2 (two) times a day.     REGULOID, SUGAR FREE Powd Take 6 g by mouth daily.      risperiDONE (RISPERDAL) 0.5 MG tablet TAKE 1 TABLET BY MOUTH TWICE DAILY     sertraline (ZOLOFT) 100 MG tablet TAKE ONE AND ONE-HALF TABLETS (150MG) BY MOUTH EVERY MORNING     topiramate (TOPAMAX) 25 MG tablet Take 25 mg by mouth 2 (two) times a day.            traZODone (DESYREL) 50 MG tablet Take 1-2 tablets ( mg total) by mouth at bedtime as needed for sleep.     triamcinolone (KENALOG) 0.1 % cream Apply topically 2 (two) times a day.     No current facility-administered medications on file prior to visit.           Review of Systems:    Chronic headache and neck pain,, otherwise per HPI, remainder of review of systems is negative for: General, eyes, ears, nose, throat, neck, respiratory, cardiovascular, gastrointestinal, genitourinary, meniscal skeletal, neurological, hematological, endocrine and dermatological system    Coordination of Care:   More than 25 minutes spent on this visit with more than 50% of time spent on: Coordination of care with nurse, discussing  care with group home staff , providing supportive therapy regarding above issues, discussing medications with group home staff and patient.     This note was created with the help of Dragon dictation system.  All grammatical/typing errors or context distortion  are unintentional.    Samia Hebert MD

## 2021-06-02 NOTE — PATIENT INSTRUCTIONS - HE
1.Patient will take the medications as prescribed.    Zoloft 150 mg every morning for depression.   Aricept  10 mg every am  Risperdal 0.5 mg twice a day  Cogentin 0.5mg 2 times a day  Trazodone 50 -100 mg  at night as needed   Patient will not stop taking medications or adjust them without consulting with the provider.    2.Staff will call with any problems between 2 visits.   3.long term staff will take the patient to the emergency room if not feeling safe  or unable to function in the community, or if suicidal, homicidal or hearing voices or having paranoia.   4. long term staff will watch his diet and exercise.   5.Patient will see non psychiatric providers for non psychiatric disorders.   6. Next appointment in 3 months  7.  staff will continue to encourage him to use a walker instead a wheelchair

## 2021-06-03 VITALS
DIASTOLIC BLOOD PRESSURE: 73 MMHG | HEART RATE: 59 BPM | SYSTOLIC BLOOD PRESSURE: 116 MMHG | TEMPERATURE: 98.1 F | BODY MASS INDEX: 26.54 KG/M2 | HEIGHT: 70 IN

## 2021-06-03 VITALS — BODY MASS INDEX: 26.54 KG/M2 | HEIGHT: 70 IN

## 2021-06-06 NOTE — TELEPHONE ENCOUNTER
Date of Last Office Visit: 10/15/19  Date of Next Office Visit: 4/14/20  No shows since last visit: none  Cancellations since last visit: none  ED visits since last visit:  none  Medication Sertraline 100 mg date last ordered: 9/20/19  Qty: 45  Refills: 5  Lapse in therapy greater than 7 days: no  Medication refill request verified as identical to current order: yes  Result of Last DAM, VPA, Li+ Level, CBC, or Carbamazepine Level (at or since last visit): N/A     [x] Medication refilled per [facility] M-1.   [] Medication unable to be refilled by RN due to criteria not met as indicated below:     []Eligibility - not seen in last year    []Supervision - no future appointment    []Compliance     []Verification - order discrepancy    []Controlled Medication    []Medication not included in RN Protocol    []90 - day supply request    []Other   Current Medication list:    acetaminophen (TYLENOL) 500 MG tablet  Take 500-1,000 mg by mouth every 12 (twelve) hours as needed for pain (Do not exceed 4000mg in 24 hour period).        aspirin 81 MG tablet  Take 81 mg by mouth daily. Take with food at 8AM    benztropine (COGENTIN) 0.5 MG tablet  TAKE 1 TABLET BY MOUTH TWICE DAILY    bisacodyl (DULCOLAX) 5 mg EC tablet  Take 10 mg by mouth daily as needed for constipation.    blood glucose test (CONTOUR NEXT STRIPS) strips  Test twice daily.  Pharmacy allowed to substitute per patient's insurance. Dx code: E11.9    blood-glucose meter (CONTOUR NEXT EZ METER) Misc  Use as directed. Pharmacy allowed to substitute per patient's insurance. Dx code: E11.9    calcium-vitamin D 500 mg(1,250mg) -200 unit per tablet  Take 1 tablet by mouth 2 (two) times a day with meals.    donepezil (ARICEPT) 10 MG tablet  TAKE 1 TABLET BY MOUTH EVERY MORNING    ibuprofen (ADVIL,MOTRIN) 200 MG tablet  Take 200-400 mg by mouth every 8 (eight) hours as needed for pain.        lancets (SURE COMFORT LANCETS) 30 gauge Misc  Test twice daily.  Pharmacy allowed to  substitute per patient's insurance. Dx code: E11.9    levETIRAcetam (KEPPRA XR) 500 mg 24 hr tablet  Take 1 tablet (500 mg total) by mouth every morning.    losartan (COZAAR) 50 MG tablet  Take 25 mg by mouth daily with breakfast.        metFORMIN (GLUCOPHAGE-XR) 500 MG 24 hr tablet  Take 1,000 mg by mouth 2 (two) times a day.        multivitamin (MULTIVITAMIN) per tablet  Take 1 tablet by mouth daily.    omeprazole (PRILOSEC) 20 MG capsule  Take 20 mg by mouth daily.    ondansetron (ZOFRAN) 4 MG tablet  Take 4 mg by mouth every 4 (four) hours as needed for nausea.    oxybutynin (DITROPAN XL) 5 MG ER tablet  Take 2.5 mg by mouth 2 (two) times a day.    REGULOID, SUGAR FREE Powd  Take 6 g by mouth daily.    risperiDONE (RISPERDAL) 0.5 MG tablet  TAKE 1 TABLET BY MOUTH TWICE DAILY    sertraline (ZOLOFT) 100 MG tablet  TAKE ONE AND ONE-HALF TABLETS (150MG) BY MOUTH EVERY MORNING    topiramate (TOPAMAX) 25 MG tablet  Take 25 mg by mouth 2 (two) times a day.        traZODone (DESYREL) 50 MG tablet  Take 1-2 tablets ( mg total) by mouth at bedtime as needed for sleep.    triamcinolone (KENALOG) 0.1 % cream  Apply topically 2 (two) times a day.      Medication Plan of Care at last office visit with MD/CNP:  Group home staff member, the patient and I discussed treatment plan and these are  recommendations.   1.Patient will take the medications as prescribed.  Zoloft 150 mg every morning for depression.   Aricept  10 mg every am  Risperdal 0.5 mg twice a day  Cogentin 0.5mg 2 times a day  Trazodone 50 -100 mg  at night as needed   Patient will not stop taking medications or adjust them without consulting with the provider.  2.Staff will call with any problems between 2 visits.   3.retirement staff will take the patient to the emergency room if not feeling safe  or unable to function in the community, or if suicidal, homicidal or hearing voices or having paranoia.   4. retirement staff will watch his diet and exercise.    5.Patient will see non psychiatric providers for non psychiatric disorders.   6. Next appointment in 3 months  7.  staff will continue to encourage him to use a walker instead a wheelchair

## 2021-06-07 NOTE — TELEPHONE ENCOUNTER
Date of Last Office Visit: 10/15/19  Date of Next Office Visit: 4/14/20   No shows since last visit: 0  Cancellations since last visit: 0  ED visits since last visit: 0     Medication benztropine 0.5 mg date last ordered: 10/22/19  Qty: 60  Refills: 5  Medication donepezil 10 mg date last ordered: 10/22/19  Qty: 31  Refills: 5  Medication risperidone 0.5 mg date last ordered: 10/22/19  Qty: 60  Refills: 5    Lapse in therapy greater than 7 days: No, early request due to packing purposes.   Medication refill request verified as identical to current order: Yes  Result of Last DAM, VPA, Li+ Level, CBC, or Carbamazepine Level (at or since last visit): N/A     [] Medication refilled per [facility] M-1.   [x] Medication unable to be refilled by RN due to criteria not met as indicated below:     []Eligibility - not seen in last year    []Supervision - no future appointment    []Compliance     []Verification - order discrepancy    []Controlled Medication    []Medication not included in RN Protocol    []90 - day supply request    []Other - Pt is a few days away from not being seen in clinic over 6 months. Has upcoming appt with provider.     Current Medication list: Tavon Arias    (MRN [id number])   Your Current Medications Are     acetaminophen (TYLENOL) 500 MG tablet  Take 500-1,000 mg by mouth every 12 (twelve) hours as needed for pain (Do not exceed 4000mg in 24 hour period).        aspirin 81 MG tablet  Take 81 mg by mouth daily. Take with food at 8AM    benztropine (COGENTIN) 0.5 MG tablet  TAKE 1 TABLET BY MOUTH TWICE DAILY    bisacodyl (DULCOLAX) 5 mg EC tablet  Take 10 mg by mouth daily as needed for constipation.    blood glucose test (CONTOUR NEXT STRIPS) strips  Test twice daily.  Pharmacy allowed to substitute per patient's insurance. Dx code: E11.9    blood-glucose meter (CONTOUR NEXT EZ METER) [facility]  Use as directed. Pharmacy allowed to substitute per patient's insurance. Dx code: E11.9    calcium-vitamin D 500  mg(1,250mg) -200 unit per tablet  Take 1 tablet by mouth 2 (two) times a day with meals.    donepezil (ARICEPT) 10 MG tablet  TAKE 1 TABLET BY MOUTH EVERY MORNING    ibuprofen (ADVIL,MOTRIN) 200 MG tablet  Take 200-400 mg by mouth every 8 (eight) hours as needed for pain.        lancets (SURE COMFORT LANCETS) 30 gauge Misc  Test twice daily.  Pharmacy allowed to substitute per patient's insurance. Dx code: E11.9    levETIRAcetam (KEPPRA XR) 500 mg 24 hr tablet  Take 1 tablet (500 mg total) by mouth every morning.    losartan (COZAAR) 50 MG tablet  Take 25 mg by mouth daily with breakfast.        metFORMIN (GLUCOPHAGE-XR) 500 MG 24 hr tablet  Take 1,000 mg by mouth 2 (two) times a day.        multivitamin (MULTIVITAMIN) per tablet  Take 1 tablet by mouth daily.    omeprazole (PRILOSEC) 20 MG capsule  Take 20 mg by mouth daily.    ondansetron (ZOFRAN) 4 MG tablet  Take 4 mg by mouth every 4 (four) hours as needed for nausea.    oxybutynin (DITROPAN XL) 5 MG ER tablet  Take 2.5 mg by mouth 2 (two) times a day.    REGULOID, SUGAR FREE Powd  Take 6 g by mouth daily.    risperiDONE (RISPERDAL) 0.5 MG tablet  TAKE 1 TABLET BY MOUTH TWICE DAILY    sertraline (ZOLOFT) 100 MG tablet  TAKE ONE AND ONE-HALF TABLETS (150MG) BY MOUTH EVERY MORNING    topiramate (TOPAMAX) 25 MG tablet  Take 25 mg by mouth 2 (two) times a day.        traZODone (DESYREL) 50 MG tablet  Take 1-2 tablets ( mg total) by mouth at bedtime as needed for sleep.    triamcinolone (KENALOG) 0.1 % cream  Apply topically 2 (two) times a day.         Medication Plan of Care at last office visit with MD/CNP:  1.Patient will take the medications as prescribed.     Zoloft 150 mg every morning for depression.   Aricept  10 mg every am  Risperdal 0.5 mg twice a day  Cogentin 0.5mg 2 times a day  Trazodone 50 -100 mg  at night as needed   Patient will not stop taking medications or adjust them without consulting with the provider.     2.Staff will call with any  problems between 2 visits.   3.long term staff will take the patient to the emergency room if not feeling safe  or unable to function in the community, or if suicidal, homicidal or hearing voices or having paranoia.   4. long term staff will watch his diet and exercise.   5.Patient will see non psychiatric providers for non psychiatric disorders.   6. Next appointment in 3 months  7.  staff will continue to encourage him to use a walker instead a wheelchair

## 2021-06-07 NOTE — PATIENT INSTRUCTIONS - HE
1.Patient will take the medications as prescribed.  Decrease Cogentin 0.5 mg daily  Zoloft 150 mg every morning for depression.   Aricept  10 mg every am  Risperdal 0.5 mg twice a day  Trazodone 50 -100 mg  at night as needed   Patient will not stop taking medications or adjust them without consulting with the provider.    2.Staff will call with any problems between 2 visits.   3.halfway staff will take the patient to the emergency room if not feeling safe  or unable to function in the community, or if suicidal, homicidal or hearing voices or having paranoia.   4. [facility] staff will watch his diet and exercise.   5.Patient will see non psychiatric providers for non psychiatric disorders.   6. Next appointment in 3 months  7.  staff will continue to encourage him to use a walker instead a wheelchair

## 2021-06-07 NOTE — PROGRESS NOTES
"Telephone Outpatient Psychiatric Progress Note    Date of visit: 4/14/2020      Tavon Arias is a 65 y.o. male who is being evaluated via a billable telephone visit.      The patient has been notified of following:     \"This telephone visit will be conducted via a call between you and your physician/provider. We have found that certain health care needs can be provided without the need for a physical exam.  This service lets us provide the care you need with a short phone conversation.  If a prescription is necessary we can send it directly to your pharmacy.  If lab work is needed we can place an order for that and you can then stop by our lab to have the test done at a later time.    Telephone visits are billed at different rates depending on your insurance coverage. During this emergency period, for some insurers they may be billed the same as an in-person visit.  Please reach out to your insurance provider with any questions.    If during the course of the call the physician/provider feels a telephone visit is not appropriate, you will not be charged for this service.\"    Patient has given verbal consent to a Telephone visit? Yes     Patient would like to receive their AVS by fax  or      Additional provider notes:         Discussion of Care and Treatment Recommendations:       This visit is conducted by phone due to social distancing due to coronavirus.  Tavon is present by the phone and the [facility] staff is present by the phone with Magnus permission to help with getting information.    Tavon is a 65 y.o. 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 He is a resident of the [facility]. He comes for this appointment accompanied by group [place] staff.    Group home staff member, the patient and I discussed treatment plan and these are  " recommendations.     1.Patient will take the medications as prescribed.  Decrease Cogentin 0.5 mg daily  Zoloft 150 mg every morning for depression.   Aricept  10 mg every am  Risperdal 0.5 mg twice a day  Trazodone 50 -100 mg  at night as needed   Patient will not stop taking medications or adjust them without consulting with the provider.    2.Staff will call with any problems between 2 visits.   3.[facility] staff will take the patient to the emergency room if not feeling safe  or unable to function in the community, or if suicidal, homicidal or hearing voices or having paranoia.   4. [facility] staff will watch his diet and exercise.   5.Patient will see non psychiatric providers for non psychiatric disorders.   6. Next appointment in 3 months  7.  staff will continue to encourage him to use a walker instead a wheelchair           DIagnoses:     Mood disorder due to traumatic brain injury.  Adjustment disorder with mixed anxiety and depressed mood  Dementia due to head trauma without  behavioral disturbance.  Extrapyramidal symptoms  Cocaine abuse in full remission.   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, With Hallucinations     Chronic Obstructive Pulmonary Disease     Essential Hypertension     Headache     Type 2 Diabetes Mellitus - Uncomplicated, Controlled     Alcohol Dependence In Remission     Encephalopathy     Anoxic Brain Damage     Mood disorder due to old head trauma     Epilepsy and Recurrent Seizures     Chronic hepatitis C (H)     Opiate dependence, continuous (H)     Neuroleptic Consent signed on 10/13/15      Follicular lymphoma (H)             Chief Complaint / Subjective:      Chief complaint: shoulder and head and neck.,  Cannot do much due to coronavirus    History of Present Illness: This visit is conducted by phone due to social distancing due to coronavirus.  Tavon is present by the phone and the group home staff member is present to help with obtaining information and clarifying some responses.  Tavon says he wakes up at night and he is able to fall asleep. He says pain wakes him up. Appetie is good. Energy is decreased. He says he stays in bed all the time. He is tired.  staff says he does not stay in bed all the time. It changes from day to day.  Sometimes he has more interest to do things and sometimes he just stays in bed or he just sits all day and watches TV.  He does not bother anyone and he does not seem bothered by that.  He says he denies thoughts of hurting self or others.He denies delusions and hallucintions. He lives with 4 other people. They get along well. He knows about corona virus , but he says he does not have it. The  staff says they educate them about distancing. They do  not have any visitors. They do not go anywhere.  Home staff says that they try to protect the residence by keeping them in the house.    He does not go to Day treatment  . Staff says they can not go anywhere out of the building.  Tavon says he is able to tolerate being in the house [facility] staff report that clients get along ok.  Tavon has a sister. He saw her list time for [place].Tavon  talks again about his neck and headaches. He saw neurologist and he ordered Neurontin. He is not sure if it helps.  staff says that Tavon has problems with short term memory. He has breakfast , and after 15 minutes he askes for breakfast again. he takes Aricept 10 mg daily for memory and it helps to a certain degree.  We discussed some of his memory problems could be due to Cogentin.  I will decrease Cogentin to 0.5 mg daily.   Group home staff member says that they can redirect him.  Group home staff member says that even though it is hard for those clients to stay at home and do not do much, they are getting along unusually well.  He moved to this group home about a year ago and he has adjusted well to it.  Group home staff says that clients more functional and they interact with each other.    From the past note:  Past psychiatric history:  Hospitalizations: 2 in 2006  Suicide attempt: Accidental overdose on heroin in 2006 and he had anoxic brain injury and chronic headaches.   ECT no  Guns:no  Chemical dependency history: Patient had 6 chemical dependency treatments more than 10 years ago    Family history:   Psychiatric: Questionable  Suicide attempt: Questionable  Chemical dependency: Positive  Diabetes: Positive    Social history: Patient has a GED.    No abuse.    His parents  when he was 20 years old.    He was  and .    He has 2 children.  No contact.    He is on SSDI.  He lives in a group home.    He has conservator for financial decisions    Mental Status Examination today:   General:  cooperative  Orientation: Oriented to self, completely disoriented in time and its chronic, partially oriented in place  Speech: Impaired articulation due to TBI  Language: normal naming  Thought process: [place]   Thought content: Denies hallucinations and delusions   Suicidal thoughts: Denies  Homicidal thoughts: Denies  Associations: connected   Affect: Anxious  Mood: Depressed  Intellectual functioning: Decreased due to TBI  Memory: Decreased due to TBI   Fund of knowledge: Decreased due to TBI   Attention and concentration: Seems to be at baseline  Gait: I cannot assess it because this is phone visit, but the staff says that he uses walker at home  Psychomotor activity: Mild agitation in his voice  Muscles: I cannot assess it because this is phone visit  InSight and judgment: Limited    Medication changes:  Yes  Medication adherence: compliant  Medication side effects: absent  Information about medications: Discussed with group home staff     Psychotherapy: Basic day-to-day support coping with coronavirus check    Education: Diet, exercise, abstinence from drugs and alcohol,     Lab Results:   Personally reviewed     Lab Results   Component Value Date    WBC 3.3 (L) 02/09/2016    HGB 14.0 02/09/2016    HCT 41.4 02/09/2016     02/09/2016    CHOL 139 02/09/2016    TRIG 111 02/09/2016    HDL 37 (L) 02/09/2016    LDLDIRECT 91.0 06/28/2011    ALT 42 02/09/2016    AST 27 02/09/2016     02/09/2016    K 4.2 02/09/2016     02/09/2016    CREATININE 0.84 02/09/2016    BUN 15 02/09/2016    CO2 32 02/09/2016    TSH 0.99 02/09/2016    PSA 0.77 06/24/2010    HGBA1C 5.9 02/09/2016    MICROALBUR <0.50 02/09/2016       Vital signs:  There were no vitals taken for this visit.    Allergies: Adhesive and Latex         Medications:       Current Outpatient Medications on File Prior to Visit   Medication Sig     aspirin 81 MG tablet Take 81 mg by mouth daily. Take with food at 8AM     benztropine (COGENTIN) 0.5 MG tablet TAKE 1 TABLET BY MOUTH TWICE DAILY     bisacodyl (DULCOLAX) 5 mg EC tablet Take 10 mg by mouth daily as needed for constipation.     blood glucose test (CONTOUR NEXT STRIPS) strips Test twice daily.  Pharmacy allowed to substitute per patient's insurance. Dx code: E11.9     blood-glucose meter (CONTOUR NEXT EZ METER) Misc Use as directed. Pharmacy allowed to substitute per patient's insurance. Dx code: E11.9     calcium-vitamin D 500 mg(1,250mg) -200 unit per tablet Take 1 tablet by mouth 2 (two) times a day with meals.     donepeziL (ARICEPT) 10 MG tablet TAKE 1 TABLET BY MOUTH EVERY MORNING     gabapentin (NEURONTIN) 100 MG capsule Take 100 mg by mouth 3 (three) times a day. Prescribed by MILI luevano (SURE COMFORT LANCETS) 30 gauge Misc Test twice daily.  Pharmacy allowed to substitute per  patient's insurance. Dx code: E11.9     losartan (COZAAR) 50 MG tablet Take 25 mg by mouth daily with breakfast.            metFORMIN (GLUCOPHAGE-XR) 500 MG 24 hr tablet Take 1,000 mg by mouth 2 (two) times a day.            multivitamin (MULTIVITAMIN) per tablet Take 1 tablet by mouth daily.     omeprazole (PRILOSEC) 20 MG capsule Take 20 mg by mouth daily.     ondansetron (ZOFRAN) 4 MG tablet Take 4 mg by mouth every 4 (four) hours as needed for nausea.     oxybutynin (DITROPAN XL) 5 MG ER tablet Take 2.5 mg by mouth 2 (two) times a day.     REGULOID, SUGAR FREE Powd Take 6 g by mouth daily.      risperiDONE (RISPERDAL) 0.5 MG tablet TAKE 1 TABLET BY MOUTH TWICE DAILY     sertraline (ZOLOFT) 100 MG tablet TAKE ONE AND ONE-HALF TABLETS (150MG) BY MOUTH EVERY MORNING  *1 TOTAL FILL*     traZODone (DESYREL) 50 MG tablet Take 1-2 tablets ( mg total) by mouth at bedtime as needed for sleep.     triamcinolone (KENALOG) 0.1 % cream Apply topically 2 (two) times a day.     acetaminophen (TYLENOL) 500 MG tablet Take 500-1,000 mg by mouth every 12 (twelve) hours as needed for pain (Do not exceed 4000mg in 24 hour period).            ibuprofen (ADVIL,MOTRIN) 200 MG tablet Take 200-400 mg by mouth every 8 (eight) hours as needed for pain.            levETIRAcetam (KEPPRA XR) 500 mg 24 hr tablet Take 1 tablet (500 mg total) by mouth every morning. (Patient taking differently: Take 500 mg by mouth 2 (two) times a day.       )     topiramate (TOPAMAX) 25 MG tablet Take 25 mg by mouth 2 (two) times a day.            No current facility-administered medications on file prior to visit.           Review of Systems:    Chronic headache and neck pain, otherwise remainder review of systems is negative for: General, eyes, ears, nose, throat, neck, respiratory, cardiovascular, gastrointestinal, genitourinary, musculoskeletal, neurological, hematological and endocrine system.    Phone call duration:28  minutes  Coordination of Care:    28 minutes spent on this visit with more than 50% of time spent on: discussing  care with group home staff , providing supportive therapy regarding above issues, discussing medications with group home staff and patient.     This note was created with the help of Dragon dictation system.  All grammatical/typing errors or context distortion  are unintentional.    Samia Hebert MD

## 2021-06-07 NOTE — TELEPHONE ENCOUNTER
Date of Last Office Visit: 10-15-19  Date of Next Office Visit: 4-14-20  No shows since last visit: 0  Cancellations since last visit: 0  ED visits since last visit:  0  Medication zoloft  date last ordered: 3-9-20  Qty: 45  Refills: 0  Lapse in therapy greater than 7 days: no  Medication refill request verified as identical to current order: yes  Result of Last DAM, VPA, Li+ Level, CBC, or Carbamazepine Level (at or since last visit): N/A     [] Medication refilled per [facility] M-1.   [x] Medication unable to be refilled by RN due to criteria not met as indicated below:     []Eligibility - not seen in last year    []Supervision - no future appointment    []Compliance     []Verification - order discrepancy    []Controlled Medication    []Medication not included in RN Protocol    []90 - day supply request    [x]Other- do you want additional refills   Current Medication list:    acetaminophen (TYLENOL) 500 MG tablet  Take 500-1,000 mg by mouth every 12 (twelve) hours as needed for pain (Do not exceed 4000mg in 24 hour period).        aspirin 81 MG tablet  Take 81 mg by mouth daily. Take with food at 8AM    benztropine (COGENTIN) 0.5 MG tablet  TAKE 1 TABLET BY MOUTH TWICE DAILY    bisacodyl (DULCOLAX) 5 mg EC tablet  Take 10 mg by mouth daily as needed for constipation.    blood glucose test (CONTOUR NEXT STRIPS) strips  Test twice daily.  Pharmacy allowed to substitute per patient's insurance. Dx code: E11.9    blood-glucose meter (CONTOUR NEXT EZ METER) Misc  Use as directed. Pharmacy allowed to substitute per patient's insurance. Dx code: E11.9    calcium-vitamin D 500 mg(1,250mg) -200 unit per tablet  Take 1 tablet by mouth 2 (two) times a day with meals.    donepezil (ARICEPT) 10 MG tablet  TAKE 1 TABLET BY MOUTH EVERY MORNING    ibuprofen (ADVIL,MOTRIN) 200 MG tablet  Take 200-400 mg by mouth every 8 (eight) hours as needed for pain.        lancets (SURE COMFORT LANCETS) 30 gauge Misc  Test twice daily.  Pharmacy  allowed to substitute per patient's insurance. Dx code: E11.9    levETIRAcetam (KEPPRA XR) 500 mg 24 hr tablet  Take 1 tablet (500 mg total) by mouth every morning.    losartan (COZAAR) 50 MG tablet  Take 25 mg by mouth daily with breakfast.        metFORMIN (GLUCOPHAGE-XR) 500 MG 24 hr tablet  Take 1,000 mg by mouth 2 (two) times a day.        multivitamin (MULTIVITAMIN) per tablet  Take 1 tablet by mouth daily.    omeprazole (PRILOSEC) 20 MG capsule  Take 20 mg by mouth daily.    ondansetron (ZOFRAN) 4 MG tablet  Take 4 mg by mouth every 4 (four) hours as needed for nausea.    oxybutynin (DITROPAN XL) 5 MG ER tablet  Take 2.5 mg by mouth 2 (two) times a day.    REGULOID, SUGAR FREE Powd  Take 6 g by mouth daily.    risperiDONE (RISPERDAL) 0.5 MG tablet  TAKE 1 TABLET BY MOUTH TWICE DAILY    sertraline (ZOLOFT) 100 MG tablet  TAKE ONE AND ONE-HALF TABLETS (150MG) BY MOUTH EVERY MORNING    topiramate (TOPAMAX) 25 MG tablet  Take 25 mg by mouth 2 (two) times a day.        traZODone (DESYREL) 50 MG tablet  Take 1-2 tablets ( mg total) by mouth at bedtime as needed for sleep.    triamcinolone (KENALOG) 0.1 % cream  Apply topically 2 (two) times a day.        Medication Plan of Care at last office visit with MD/CNP:  1.Patient will take the medications as prescribed.     Zoloft 150 mg every morning for depression.   Aricept  10 mg every am  Risperdal 0.5 mg twice a day  Cogentin 0.5mg 2 times a day  Trazodone 50 -100 mg  at night as needed   Patient will not stop taking medications or adjust them without consulting with the provider.     2.Staff will call with any problems between 2 visits.   3.retirement staff will take the patient to the emergency room if not feeling safe  or unable to function in the community, or if suicidal, homicidal or hearing voices or having paranoia.   4. retirement staff will watch his diet and exercise.   5.Patient will see non psychiatric providers for non psychiatric disorders.   6.  Next appointment in 3 months  7.  staff will continue to encourage him to use a walker instead a wheelchair

## 2021-06-09 NOTE — PROGRESS NOTES
Pt complains of having daily headaches. No other concerns today. DISCUS performed. His score was a 1. Next due     [employer] [place] Date: 17  [employer] Query Report Page#: 1  Patient Rx History Report  LIZZIE SHIPLEY  Search Criteria: Last Name 'lizzie' and First Name 'dinah' and  = ' and Request Period = ' to  ' - 3 out of 3 Recipients Selected.  Fill Date Product, Str, Form Qty Days Pt ID Prescriber Written RX# N/R* Pharm MED+  ---------- -------------------------------- ------ ---- --------- ---------- ---------- ------------ ----- --------- ------  2017 CLONAZEPAM 1 MG TABLET 30.00 30 [id number] 2017 [id number] N [id number] 00.0  2017 CLONAZEPAM 1 MG TABLET 28.00 28 [id number] 2017 [id number] N [id number] 00.0  2016 CLONAZEPAM 1 MG TABLET 30.00 30 [id number] 2016 [id number] N [id number] 00.0  2016 CLONAZEPAM 1 MG TABLET 31.00 31 [id number] 2016 [id number] N [id number] 00.0  10/28/2016 CLONAZEPAM 1 MG TABLET 30.00 30 [id number] 10/28/2016 [id number] N [id number] 00.0  2016 CLONAZEPAM 1 MG TABLET 30.00 30 [id number] 2016 [id number] N [id number] 00.0  *N/R N=New R=Refill  +MED Daily  Prescribers for prescriptions listed  ----------------------------------------------------------------------------------------------------------------------------------  [id number] JUSTIN ANNA MD; [address], [facility], [place] [address]  Pharmacies that dispensed prescriptions listed  ----------------------------------------------------------------------------------------------------------------------------------  [id number] [facility]; [address] [facility] [address],  Patients that match search  criteria  ----------------------------------------------------------------------------------------------------------------------------------  [id number] DONAVON OMNGE,  59; [address] [facility] [address]  [id number] DONAVON SHIPLEY  [birth year][facility] [address] [address]  [id number] DONAVON SHIPLEY  [birth year][facility] [address] [address]  MED Summary  This section displays cumulative MED values by unique recipient. The MED Max value is the maximum occurrence of cumulative MED  sustained for any 3 consecutive days. This value is calculated based on prescriptions dispensed during the date range requested.  -----------------------------------------------------------------------------------------------------------------------------------  0 QUINTEN RAPHAEL; 1955[facility] [address] [facility] [address]    Correct pharmacy verified with patient and confirmed in snapshot? [x] yes []no    Medications Phoned  to Pharmacy [] yes [x]no  Name of Pharmacist:  List Medications, including dose, quantity and instructions      Medication Prescriptions given to patient   [] yes  [x] no   List the name of the drug the prescription was written for.       Medications ordered this visit were e-scribed.  Verified by order class [] yes  [x] no    Medication changes or discontinuations were communicated to patient's pharmacy: [] yes  [x] no    UA collected [] yes    [x] no    [place] Prescription Monitoring Program Reviewed? [x] yes  [] no    Referrals were made to:  none  Future appointment was made: [x] yes  [] no  17  Dictation completed at time of chart check: [] yes  [x] no    I have checked the documentation for today s encounters and the above information has been reviewed and completed.

## 2021-06-09 NOTE — PROGRESS NOTES
Psychiatric  Progress Note  Date of visit: 3/8/2017         Discussion of Care and Treatment Recommendations:   This is a 62 y.o. 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 He is a resident of the [facility]. He comes for this appointment accompanied by group home staff.    Group home staff member, the patient and I discussed treatment plan and these are  recommendations.     1.Patient will take the medications as prescribed.   Aricept  10 mg every am  Risperdal 0.5 mg twice a day  Klonopin 1mg every am  Celexa 40 mg every morning  Cogentin 0.5mg 2 times a day   Patient will not stop taking medications or adjust them without consulting with the provider.    2.Staff will call with any problems between 2 visits.   3.[facility] staff will take the patient to the emergency room if not feeling safe , unable to function in the community, or if suicidal, homicidal or hearing voices or having paranoia.   4. [facility] staff will watch his diet and exercise.   5.Patient will see non psychiatric providers for non psychiatric disorders.  Dr Harden is his primary MD  6. Next appointment in 1 month or earlier if needed  7.  staff will continue to encourage him to use a walker instead a wheelchair  8. Pt should have guardian fir medical decision making, not just finances         DIagnoses:      Mood disorder due to traumatic brain injury.   Dementia due to traumatic brain injury.   Cocaine abuse in full remission.   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, With Hallucinations     Chronic Obstructive Pulmonary Disease     Essential Hypertension     Headache     Type 2 Diabetes Mellitus - Uncomplicated, Controlled     Alcohol Dependence In Remission     Encephalopathy     Anoxic Brain Damage     Mood disorder due to old head trauma     Epilepsy and Recurrent Seizures     Chronic hepatitis C     Opiate dependence, continuous     Neuroleptic Consent signed on 10/13/15     Follicular lymphoma             Chief Complaint / Subjective:    Chief complaint: Patient says he is doing okay except that he has headaches    History of Present Illness: Patient is alert and oriented to self and situation.  He knows that he is in doctor's offices but he does not the name of the hospital.  He knows that I'm his psychiatrist but he cannot remember my name.  He is not oriented in time.  He says he doesn't care.  He says that he is okay except for chronic headaches.  He says that he uses wheelchair.  He says if he starts to get That he can get dizzy.  He is not suicidal or homicidal.  He denies delusions and hallucinations.  He is clean clothes.  His beard is shaved.  He smiles appropriately.  No inappropriate comments.  He says that sleep is good.  Appetite is good.  Energy is so-so.  Concentration at baseline.  He takes Aricept for memory decline.  He is not sure how much it helps.  He then says that he can remember me and he knows that he is a doctor's office, so he thinks it's good.  He doesn't use any drugs or alcohol he lives in supervised setting.  [facility] takes him to his appointments regularly.  He says that he likes to watch TV.  He doesn't get into fights with other people.  He says that he is not aggressive.    Past psychiatric history:  Hospitalizations: 2 in 2006  Suicide attempt: Accidental overdose on heroin many years ago  ECT no  Chemical dependency history: Patient had 6 chemical dependency treatments more than 10  years ago    Family history:   Psychiatric: Questionable  Suicide attempt: Questionable  Chemical dependency: Positive  Diabetes: Positive    Social history: Patient has a GED.  No abuse.  His parents  when he was 20 years old.  He was  and .  He has 2 children.  No contact.  He is on SSDI.  He lives in a group home.  He is his own guardian but he would benefit from having guardian for medical decision-making.  He has conservative for financial decisions      Mental Status Examination:   General: fair hygiene, cooperative   Speech: Impaired articulation post traumatic brain injury   Language: Patient is able to name things   Thought process: [place] short responses   Thought content: Devoid of delusions and hallucinations   Suicidal thoughts: Absent   Homicidal thoughts: Absent   Associations: Connected within what he says   Affect: Neutral   Mood: good  Intellectual functioning: Decreased due to traumatic brain injury   Memory: Impaired due to traumatic brain injury   Fund of knowledge: Decreased due to traumatic brain injury   Attention and concentration: At his baseline   Gait: Wheelchair-bound   Psychomotor activity: Calm, no agitation   Muscles: No atrophy, no abnormal movements   InSight and judgment: Limited      Medication adherence: compliant  Medication side effects: absent  Information about medications: Discussed with group home staff inpatient    Psychotherapy: Basic day-to-day support.    Education: Diet, exercise, abstinence from drugs and alcohol,   Lab Results:   Personally reviewed     Lab Results   Component Value Date    WBC 3.3 (L) 02/09/2016    HGB 14.0 02/09/2016    HCT 41.4 02/09/2016     02/09/2016    CHOL 139 02/09/2016    TRIG 111 02/09/2016    HDL 37 (L) 02/09/2016    LDLDIRECT 91.0 06/28/2011    ALT 42 02/09/2016    AST 27 02/09/2016     02/09/2016    K 4.2 02/09/2016     02/09/2016    CREATININE 0.84 02/09/2016    BUN 15 02/09/2016    CO2 32  "02/09/2016    TSH 0.99 02/09/2016    PSA 0.77 06/24/2010    HGBA1C 5.9 02/09/2016    MICROALBUR <0.50 02/09/2016       Vital signs:  Visit Vitals     /65 (Patient Site: Right Arm, Patient Position: Sitting, Cuff Size: Adult Large)     Pulse 61     Temp 98.2  F (36.8  C) (Oral)     Ht 5' 10\" (1.778 m)       Allergies: Latex; Adhesive; and Latex         Medications:       Current Outpatient Prescriptions on File Prior to Visit   Medication Sig     acetaminophen (TYLENOL) 500 MG tablet Take 1,000 mg by mouth every 6 (six) hours as needed for pain (Do not exceed 4000mg in 24 hour period).      albuterol (PROVENTIL HFA;VENTOLIN HFA) 90 mcg/actuation inhaler Inhale 1-2 puffs 4 (four) times a day. [id number]     aspirin 81 MG tablet Take 81 mg by mouth daily. Take with food at 8AM     benztropine (COGENTIN) 0.5 MG tablet TAKE 1 TABLET BY MOUTH TWICE DAILY     bisacodyl (DULCOLAX) 5 mg EC tablet Take 10 mg by mouth daily as needed for constipation.     calcium-vitamin D 500 mg(1,250mg) -200 unit per tablet Take 1 tablet by mouth 2 (two) times a day with meals.     citalopram (CELEXA) 20 MG tablet Take 40 mg by mouth daily.     clonazePAM (KLONOPIN) 1 MG tablet Take 1 tablet (1 mg total) by mouth every morning.     donepezil (ARICEPT) 10 MG tablet TAKE 1 TABLET BY MOUTH EVERY MORNING     levETIRAcetam (KEPPRA XR) 500 mg 24 hr tablet Take 1 tablet (500 mg total) by mouth every morning. (Patient taking differently: Take 1,000 mg by mouth every morning. )     losartan (COZAAR) 50 MG tablet Take 50 mg by mouth daily with breakfast.     metFORMIN (GLUCOPHAGE) 500 MG tablet Take 1,000 mg by mouth 2 (two) times a day with meals.     multivitamin (MULTIVITAMIN) per tablet Take 1 tablet by mouth daily.     omeprazole (PRILOSEC) 20 MG capsule Take 20 mg by mouth daily.     oxybutynin (DITROPAN) 5 MG tablet Take 2.5 mg by mouth 2 (two) times a day.     REGULOID, SUGAR FREE Powd Take 6 g by mouth daily.      risperiDONE " (RISPERDAL) 0.5 MG tablet Take 1 tablet (0.5 mg total) by mouth 2 (two) times a day.     SENNOSIDES/DOCUSATE SODIUM (SENEXON-S ORAL) Take 1 tablet by mouth 2 (two) times a day.     traZODone (DESYREL) 50 MG tablet Take  mg by mouth bedtime as needed for sleep.      triamcinolone (KENALOG) 0.1 % cream Apply topically 2 (two) times a day.     clonazePAM (KLONOPIN) 0.5 MG tablet 1 pill twice a day and up to 4 times a day as needed (Patient taking differently: 1 mg. 1 pill twice a day and up to 4 times a day as needed)     [DISCONTINUED] donepezil (ARICEPT) 5 MG tablet Take 1 pill for one week every am , then 2 pills every am     [DISCONTINUED] ketoconazole (NIZORAL) 2 % cream Apply topically 2 (two) times a day.     [DISCONTINUED] magnesium oxide (MAG-OX) 400 mg tablet Take 400 mg by mouth 2 (two) times a day.     [DISCONTINUED] polyethylene glycol (MIRALAX) 17 gram packet Take 17 g by mouth daily. Dissolve in 8 oz of liquid.     No current facility-administered medications on file prior to visit.           Review of Systems:    Chronic pain,otherwise reminder of review of systems is negative    Coordination of Care:   More than 25 minutes spent on this visit with more than 50% of time spent on coordination of care including: Reviewing medical records, reviewing group home note,  filing  the group home form,coordinating care with group home staff , discussing medications with group home staff and patient, entering orders and preparing documentation for the visit.    This note was created with the help of Dragon dictation system.  All grammatical/typing errors or context distortion  are unintentional.    Samia Hebert MD

## 2021-06-10 NOTE — PROGRESS NOTES
"Telephone Outpatient Psychiatric Progress Note    Date of visit:8/5/2020    Tavon Arias is a 65 y.o. male who is being evaluated via a billable telephone visit.      The patient has been notified of following:     \"This telephone visit will be conducted via a call between you and your physician/provider. We have found that certain health care needs can be provided without the need for a physical exam.  This service lets us provide the care you need with a short phone conversation.  If a prescription is necessary we can send it directly to your pharmacy.  If lab work is needed we can place an order for that and you can then stop by our lab to have the test done at a later time.    Telephone visits are billed at different rates depending on your insurance coverage. During this emergency period, for some insurers they may be billed the same as an in-person visit.  Please reach out to your insurance provider with any questions.    If during the course of the call the physician/provider feels a telephone visit is not appropriate, you will not be charged for this service.\"    Patient has given verbal consent to a Telephone visit? Yes     Patient would like to receive their AVS by fax  or      Phone:[phone]         Discussion of Care and Treatment Recommendations:       This visit is conducted by phone due to social distancing due to coronavirus.  Tavon is present by the phone and the [facility] staff is present by the phone with [employer] permission to help with getting information.    Tavon is a 65 y.o. 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 He is a resident of the [facility]. He comes for this appointment accompanied by group home staff.    Group home staff member, the patient and I discussed treatment plan and these are  recommendations. "     1.Patient will take the medications as prescribed.  Zoloft 150 mg qam for depression  Risperdal 0.5 mg two times a day for mood   Cogentin 0.5 mg daily for involuntary movement   Aricept  10 mg every am for memory loss  Trazodone 50 -100 mg  at night as needed for sleep   Patient will not stop taking medications or adjust them without consulting with the provider.  2.Staff will call with any problems between 2 visits.   3.longterm staff will take the patient to the emergency room if not feeling safe  or unable to function in the community, or if suicidal, homicidal or hearing voices or having paranoia.   4. longterm staff will watch his diet and exercise.   5.Patient will see non psychiatric providers for non psychiatric disorders.   6. Next appointment in 6 months  7.  staff will continue to encourage him to use a walker instead a wheelchair         DIagnoses:     Mood disorder due to traumatic brain injury.  Adjustment disorder with mixed anxiety and depressed mood  Dementia due to head trauma without  behavioral disturbance.  Extrapyramidal symptoms  Cocaine abuse in full remission.   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, With Hallucinations     Chronic Obstructive Pulmonary Disease     Essential Hypertension     Headache     Type 2 Diabetes Mellitus - Uncomplicated, Controlled     Alcohol Dependence In Remission     Encephalopathy     Anoxic Brain Damage     Mood disorder due to old head trauma     Epilepsy and Recurrent Seizures     Chronic hepatitis C (H)     Opiate dependence, continuous (H)     Neuroleptic Consent  signed on 10/13/15     Follicular lymphoma (H)             Chief Complaint / Subjective:      Chief complaint: response to medication and functioning in the community, chronic headache , irritable.     History of Present Illness: This visit is conducted by phone due to social distancing due to coronavirus.  Tavon is present by the phone and the [facility] home staff member is present to help with obtaining information and clarifying some responses.  Tavon says there are no activities in the . He says people stay away from each other. He does not go to Day Treatment. He watches TV mostly. He says no one talks to anyone . He started yelling at staff during this visit. He says people irritate him all the time . Staff says they can redirect him. Energy is decreased. He says he wakes up, but he is able to fall asleep.  He has chronic headaches and that wakes him up.  He says that he feels tired during the day. He says he eats ok. No weight gain or weight loss. He denies suicidal and homicidal ideas. He denies delusions and hallucinations. He says he is iritable because everyone talks about virus and it affects everyone's life. He hopes it will go away.  He understands that there are certain rules about virus and they have to follow it.  He says he does that, but he does not like it.  He is not oriented in time . He knows he is in  and he knows his name. He takes medications. He says he can walk without wheelchair, but he mostly he uses it .   staff says they have activities and he participates in activities in the afternoon.. She says he forgets things and he has to be reminded. He does not hurt self or others and he does not act like he has hallucinations. She confirms that he takes medications. She says she can not see any involuntary movements. She says he has to use a walker.  She says that he gets agitated and yelling from time to time but they can redirect him.  He does not get into altercation with others.   She says he follows directions regarding coronavirus.    From the past note: Reviewed and updated  Past psychiatric history:  Hospitalizations: 2 in 2006  Suicide attempt: Accidental overdose on heroin in 2006 and he had anoxic brain injury and chronic headaches.   ECT no  Guns:no  Chemical dependency history: Patient had 6 chemical dependency treatments more than 10 years ago    Family history:   Psychiatric: Questionable  Suicide attempt: Questionable  Chemical dependency: Positive  Diabetes: Positive    Social history: Patient has a GED.    No abuse.    His parents  when he was 20 years old.    He was  and .    He has 2 children.  No contact.    He is on SSDI.  He lives in a group home.    He has [employer] for [employer]    Mental Status Examination today:   General:  cooperative  Orientation: Oriented to self, completely disoriented in time and its chronic, partially oriented in place  Speech: Impaired articulation due to TBI  Language: normal naming  Thought process: [place]   Thought content: Denies hallucinations and delusions   Suicidal thoughts: Denies  Homicidal thoughts: Denies  Associations: connected   Affect: irritable   Mood: depressed  Intellectual functioning: Decreased due to TBI  Memory: Decreased due to TBI   Fund of knowledge: Decreased due to TBI   Attention and concentration: Seems to be at baseline  Gait: I cannot assess it because this is phone visit, but the staff says that he uses walker at home  Psychomotor activity: Mild agitation in his voice  Muscles: I cannot assess it because this is phone visit  InSight and judgment: Limited    Medication adherence: compliant  Medication side effects: absent  Information about medications: Discussed with group home staff     Psychotherapy: Basic day-to-day support coping with coronavirus check    Education: Diet, exercise, abstinence from drugs and alcohol,     Lab Results:   Personally reviewed     Lab Results    Component Value Date    WBC 3.3 (L) 02/09/2016    HGB 14.0 02/09/2016    HCT 41.4 02/09/2016     02/09/2016    CHOL 139 02/09/2016    TRIG 111 02/09/2016    HDL 37 (L) 02/09/2016    LDLDIRECT 91.0 06/28/2011    ALT 42 02/09/2016    AST 27 02/09/2016     02/09/2016    K 4.2 02/09/2016     02/09/2016    CREATININE 0.84 02/09/2016    BUN 15 02/09/2016    CO2 32 02/09/2016    TSH 0.99 02/09/2016    PSA 0.77 06/24/2010    HGBA1C 5.9 02/09/2016    MICROALBUR <0.50 02/09/2016       Vital signs:  There were no vitals taken for this visit., because this is a phone visit     Allergies: Adhesive and Latex         Medications:       Current Outpatient Medications on File Prior to Visit   Medication Sig     acetaminophen (TYLENOL) 500 MG tablet Take 500-1,000 mg by mouth every 12 (twelve) hours as needed for pain (Do not exceed 4000mg in 24 hour period).            aspirin 81 MG tablet Take 81 mg by mouth daily. Take with food at 8AM     benztropine (COGENTIN) 0.5 MG tablet Take 1 tablet (0.5 mg total) by mouth daily.     bisacodyl (DULCOLAX) 5 mg EC tablet Take 10 mg by mouth daily as needed for constipation.     blood glucose test (CONTOUR NEXT STRIPS) strips Test twice daily.  Pharmacy allowed to substitute per patient's insurance. Dx code: E11.9     blood-glucose meter (CONTOUR NEXT EZ METER) Misc Use as directed. Pharmacy allowed to substitute per patient's insurance. Dx code: E11.9     calcium-vitamin D 500 mg(1,250mg) -200 unit per tablet Take 1 tablet by mouth 2 (two) times a day with meals.     donepeziL (ARICEPT) 10 MG tablet TAKE 1 TABLET BY MOUTH EVERY MORNING     gabapentin (NEURONTIN) 100 MG capsule Take 100 mg by mouth 3 (three) times a day. Prescribed by MILI Ivan     ibuprofen (ADVIL,MOTRIN) 200 MG tablet Take 200-400 mg by mouth every 8 (eight) hours as needed for pain.            lancets (SURE COMFORT LANCETS) 30 gauge Misc Test twice daily.  Pharmacy allowed to substitute per patient's  insurance. Dx code: E11.9     levETIRAcetam (KEPPRA XR) 500 mg 24 hr tablet Take 1 tablet (500 mg total) by mouth every morning. (Patient taking differently: Take 500 mg by mouth 2 (two) times a day.       )     losartan (COZAAR) 50 MG tablet Take 25 mg by mouth daily with breakfast.            metFORMIN (GLUCOPHAGE-XR) 500 MG 24 hr tablet Take 1,000 mg by mouth 2 (two) times a day.            multivitamin (MULTIVITAMIN) per tablet Take 1 tablet by mouth daily.     omeprazole (PRILOSEC) 20 MG capsule Take 20 mg by mouth daily.     ondansetron (ZOFRAN) 4 MG tablet Take 4 mg by mouth every 4 (four) hours as needed for nausea.     oxybutynin (DITROPAN XL) 5 MG ER tablet Take 2.5 mg by mouth 2 (two) times a day.     REGULOID, SUGAR FREE Powd Take 6 g by mouth daily.      risperiDONE (RISPERDAL) 0.5 MG tablet TAKE 1 TABLET BY MOUTH TWICE DAILY     sertraline (ZOLOFT) 100 MG tablet TAKE ONE AND ONE-HALF TABLETS (150MG) BY MOUTH EVERY MORNING  *1 TOTAL FILL*     traZODone (DESYREL) 50 MG tablet Take 1-2 tablets ( mg total) by mouth at bedtime as needed for sleep.     triamcinolone (KENALOG) 0.1 % cream Apply topically 2 (two) times a day.     [DISCONTINUED] topiramate (TOPAMAX) 25 MG tablet Take 25 mg by mouth 2 (two) times a day.            No current facility-administered medications on file prior to visit.           Review of Systems:    Chronic headache and neck pain, otherwise remainder review of systems is negative for: General, eyes, ears, nose, throat, neck, respiratory, cardiovascular, gastrointestinal, genitourinary, musculoskeletal, neurological, hematological and endocrine system.    Phone call duration: 26 minutes  Coordination of Care:   26 minutes spent on this visit with more than 50% of time spent on: discussing  care with group home staff , providing supportive therapy regarding above issues, discussing medications with group home staff and patient.     This note was created with the help of Clarisa  dictation system.  All grammatical/typing errors or context distortion  are unintentional.    Samia Hebert MD

## 2021-06-10 NOTE — PATIENT INSTRUCTIONS - HE
1.Patient will take the medications as prescribed.   Cogentin 0.5 mg daily  Zoloft 150 mg every morning for depression.   Aricept  10 mg every am  Risperdal 0.5 mg twice a day  Trazodone 50 -100 mg  at night as needed   Patient will not stop taking medications or adjust them without consulting with the provider.    2.Staff will call with any problems between 2 visits.   3.jail staff will take the patient to the emergency room if not feeling safe  or unable to function in the community, or if suicidal, homicidal or hearing voices or having paranoia.   4. jail staff will watch his diet and exercise.   5.Patient will see non psychiatric providers for non psychiatric disorders.   6. Next appointment in 3 months  7.  staff will continue to encourage him to use a walker instead a wheelchair

## 2021-06-11 NOTE — PROGRESS NOTES
Psychiatric  Progress Note  Date of visit: 6/7/2017         Discussion of Care and Treatment Recommendations:   Tavon is a 62 y.o. 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 He is a resident of the [facility]. He comes for this appointment accompanied by group home staff.    Group home staff member, the patient and I discussed treatment plan and these are  recommendations.     1.Patient will take the medications as prescribed.   Aricept  10 mg every am  Risperdal 0.5 mg twice a day  Klonopin 1mg every am  Celexa 40 mg every morning  Cogentin 0.5mg 2 times a day   Patient will not stop taking medications or adjust them without consulting with the provider.    2.Staff will call with any problems between 2 visits.   3.[facility] staff will take the patient to the emergency room if not feeling safe , unable to function in the community, or if suicidal, homicidal or hearing voices or having paranoia.   4. [facility] staff will watch his diet and exercise.   5.Patient will see non psychiatric providers for non psychiatric disorders.  Dr Harden is his primary MD  6. Next appointment in 3 month or earlier if needed  7.  staff will continue to encourage him to use a walker instead a wheelchair           DIagnoses:     Mood disorder due to traumatic brain injury.   Dementia due to traumatic brain injury.   Cocaine abuse in full remission.   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, With Hallucinations      Chronic Obstructive Pulmonary Disease     Essential Hypertension     Headache     Type 2 Diabetes Mellitus - Uncomplicated, Controlled     Alcohol Dependence In Remission     Encephalopathy     Anoxic Brain Damage     Mood disorder due to old head trauma     Epilepsy and Recurrent Seizures     Chronic hepatitis C     Opiate dependence, continuous     Neuroleptic Consent signed on 10/13/15     Follicular lymphoma             Chief Complaint / Subjective:    Chief complaint: Tired, isolated at times, sleeps during the day and less at night    History of Present Illness: Tavon says that sometimes he feels tired.  He says that he stays in bed during the night but he does not sleep through the night.  He says that he sleeps during the day.  He does not feel depressed.  He says he goes to day treatment on Tuesday and Thursday.  He spent 6 hours there.  He says the rest of the time he stays in his room or watches TV.  He lives there with 3 other clients.  They get along well.  He is not suicidal or homicidal.  He denies delusions and hallucinations.  Appetite is good.  Energy and concentration at baseline.  His group home staff says that he gets along well with other residents.  She says he has been little bit more withdrawn and less talkative and he seems tired recently.  He does not get into any altercations.  He takes his medications.  He is redirectable.  She says that when he feels better he talks a lot tend at times he is quiet.    I reviewed the group home notes.    He does not have any new medical problems at this time.  He denies side effects of medications.  He does not use any drugs or alcohol.  We discussed his symptoms.  At this time I will leave the medications as they are.  His symptoms could be due to some mood variation with change of season.  He does not say that he is depressed and it seems that his day-to-day functioning has not changed.  If it continues to be a problem antidepressant could be  adjusted.    Past psychiatric history:  Hospitalizations: 2 in 2006  Suicide attempt: Accidental overdose on heroin many years ago  ECT no  Chemical dependency history: Patient had 6 chemical dependency treatments more than 10 years ago    Family history:   Psychiatric: Questionable  Suicide attempt: Questionable  Chemical dependency: Positive  Diabetes: Positive    Social history: Patient has a GED.  No abuse.  His parents  when he was 20 years old.  He was  and .  He has 2 children.  No contact.  He is on SSDI.  He lives in a group home.  He is his own guardian but he would benefit from having guardian for medical decision-making.  He has conservative for financial decisions      Mental Status Examination:   General: fair hygiene, cooperative  Orientation: To self and place, not in time  Speech: Impaired articulation post traumatic brain injury   Language: Patient is able to name things   Thought process: [place] short responses   Thought content: Devoid of delusions and hallucinations   Suicidal thoughts: Absent   Homicidal thoughts: Absent   Associations: Connected within what he says   Affect: Neutral   Mood: good  Intellectual functioning: Decreased due to traumatic brain injury   Memory: Impaired due to traumatic brain injury   Fund of knowledge: Decreased due to traumatic brain injury   Attention and concentration: At his baseline   Gait: Wheelchair-bound   Psychomotor activity: Calm, no agitation   Muscles: No atrophy, no abnormal movements   InSight and judgment: Limited    Medication changes: Not at this time.We discussed his symptoms.  At this time I will leave the medications as they are.  His symptoms could be due to some mood variation with change of season.  He does not say that he is depressed and it seems that his day-to-day functioning has not changed.  If it continues to be a problem antidepressant could be adjusted.  Medication adherence: compliant  Medication side  "effects: absent  Information about medications: Discussed with group home staff inpatient    Psychotherapy: Basic day-to-day support.    Education: Diet, exercise, abstinence from drugs and alcohol,   Lab Results:   Personally reviewed     Lab Results   Component Value Date    WBC 3.3 (L) 02/09/2016    HGB 14.0 02/09/2016    HCT 41.4 02/09/2016     02/09/2016    CHOL 139 02/09/2016    TRIG 111 02/09/2016    HDL 37 (L) 02/09/2016    LDLDIRECT 91.0 06/28/2011    ALT 42 02/09/2016    AST 27 02/09/2016     02/09/2016    K 4.2 02/09/2016     02/09/2016    CREATININE 0.84 02/09/2016    BUN 15 02/09/2016    CO2 32 02/09/2016    TSH 0.99 02/09/2016    PSA 0.77 06/24/2010    HGBA1C 5.9 02/09/2016    MICROALBUR <0.50 02/09/2016       Vital signs:  /73 (Patient Site: Right Arm, Patient Position: Sitting, Cuff Size: Adult Regular)  Pulse (!) 57  Temp 98  F (36.7  C) (Oral)   Resp 14  Ht 5' 10\" (1.778 m)    Allergies: Latex; Adhesive; and Latex         Medications:       Current Outpatient Prescriptions on File Prior to Visit   Medication Sig     acetaminophen (TYLENOL) 500 MG tablet Take 1,000 mg by mouth every 6 (six) hours as needed for pain (Do not exceed 4000mg in 24 hour period).      albuterol (PROVENTIL HFA;VENTOLIN HFA) 90 mcg/actuation inhaler Inhale 1-2 puffs 4 (four) times a day. [id number]     aspirin 81 MG tablet Take 81 mg by mouth daily. Take with food at 8AM     benztropine (COGENTIN) 0.5 MG tablet TAKE 1 TABLET BY MOUTH TWICE DAILY     bisacodyl (DULCOLAX) 5 mg EC tablet Take 10 mg by mouth daily as needed for constipation.     blood glucose test (CONTOUR NEXT STRIPS) strips Test twice daily.  Pharmacy allowed to substitute per patient's insurance. Dx code: E11.9     blood-glucose meter (CONTOUR NEXT EZ METER) Misc Use as directed. Pharmacy allowed to substitute per patient's insurance. Dx code: E11.9     calcium-vitamin D 500 mg(1,250mg) -200 unit per tablet Take 1 tablet by " mouth 2 (two) times a day with meals.     citalopram (CELEXA) 20 MG tablet TAKE 2 TABLETS (40MG) BY MOUTH ONCE DAILY     clonazePAM (KLONOPIN) 1 MG tablet TAKE 1 TABLET BY MOUTH EVERY MORNING  **6 TOTAL FILLS*     donepezil (ARICEPT) 10 MG tablet TAKE 1 TABLET BY MOUTH EVERY MORNING     ibuprofen (ADVIL,MOTRIN) 200 MG tablet Take 200 mg by mouth every 6 (six) hours as needed for pain.     lancets (SURE COMFORT LANCETS) 30 gauge Misc Test twice daily.  Pharmacy allowed to substitute per patient's insurance. Dx code: E11.9     levETIRAcetam (KEPPRA XR) 500 mg 24 hr tablet Take 1 tablet (500 mg total) by mouth every morning. (Patient taking differently: Take 1,000 mg by mouth every morning. )     losartan (COZAAR) 50 MG tablet Take 50 mg by mouth daily with breakfast.     metFORMIN (GLUCOPHAGE) 500 MG tablet Take 1,000 mg by mouth 2 (two) times a day with meals.     multivitamin (MULTIVITAMIN) per tablet Take 1 tablet by mouth daily.     omeprazole (PRILOSEC) 20 MG capsule Take 20 mg by mouth daily.     oxybutynin (DITROPAN) 5 MG tablet Take 2.5 mg by mouth 2 (two) times a day.     REGULOID, SUGAR FREE Powd Take 6 g by mouth daily.      risperiDONE (RISPERDAL) 0.5 MG tablet TAKE 1 TABLET BY MOUTH TWICE DAILY     traZODone (DESYREL) 50 MG tablet Take  mg by mouth bedtime as needed for sleep.      triamcinolone (KENALOG) 0.1 % cream Apply topically 2 (two) times a day.     citalopram (CELEXA) 20 MG tablet Take 40 mg by mouth daily.     clonazePAM (KLONOPIN) 0.5 MG tablet 1 tablet by mouth daily     SENNOSIDES/DOCUSATE SODIUM (SENEXON-S ORAL) Take 1 tablet by mouth 2 (two) times a day.     No current facility-administered medications on file prior to visit.           Review of Systems:    Chronic pain,otherwise reminder of review of systems is negative    Coordination of Care:   More than 25 minutes spent on this visit with more than 50% of time spent on coordination of care including: Reviewing medical records,  reviewing group home note,  filing  the group home form,coordinating care with group home staff , discussing medications with group home staff and patient, entering orders and preparing documentation for the visit.    This note was created with the help of Dragon dictation system.  All grammatical/typing errors or context distortion  are unintentional.    Samia Hebert MD

## 2021-06-11 NOTE — PROGRESS NOTES
LIZZIE SHIPLEY  Search Criteria: Last Name 'lizzie' and First Name 'dinah' and  = ' and Request Period = ' to  ' - 3 out of 3 Recipients Selected.  Fill Date Product, Str, Form Qty Days Pt ID Prescriber Written RX# N/R* Pharm **MED+  ---------- -------------------------------- ------ ---- --------- ---------- ---------- ------------ ----- --------- ------  05/15/2017 CLONAZEPAM 1 MG TABLET 30.00 30 [id number] 05/15/2017 [id number] N [id number] 00.0  2017 CLONAZEPAM 1 MG TABLET 7.00 7 [id number] 2017 [id number] N [id number] 00.0  2017 CLONAZEPAM 1 MG TABLET 30.00 30 [id number] 2017 [id number] N [id number] 00.0  *N/R N=New R=Refill  +MED Daily  Prescribers for prescriptions listed  ----------------------------------------------------------------------------------------------------------------------------------  [id number] JUSTIN ANNA MD; [address], [facility] [place] [address]  [id number] CHRISTINE SORIANO MD; [address] [facility] [address]  Pharmacies that dispensed prescriptions listed  ----------------------------------------------------------------------------------------------------------------------------------  [id number] [facility]; [address] [facility] [address],  Patients that match search criteria  ----------------------------------------------------------------------------------------------------------------------------------  [id number] ISMAEL TELLEZ 59; [address] [facility] [address]  [id number] LIZZIE SHIPLEY  [birth year]; [address] [facility] [address]  [id number] LIZZIE SHIPLEY  [birth year]; [address] [facility] [address]  MED Summary  This section displays cumulative MED values by unique recipient. The MED Max value is the maximum occurrence of cumulative MED  sustained for any 3 consecutive days. This value is calculated based on  prescriptions dispensed during the date range requested.  -----------------------------------------------------------------------------------------------------------------------------------  0 QUINTEN RAPHAEL; 1955; [address] [facility] [address]    Correct pharmacy verified with patient and confirmed in snapshot? [x] yes []no    Medications Phoned  to Pharmacy [] yes [x]no  Name of Pharmacist:  List Medications, including dose, quantity and instructions      Medication Prescriptions given to patient   [] yes  [x] no   List the name of the drug the prescription was written for.      Medications ordered this visit were e-scribed.  Verified by order class [] yes  [x] no      Medication changes or discontinuations were communicated to patient's pharmacy:  [] yes  [x] no  Pharmacist Spoke With:     UA collected [] yes  [x] no    [place] Prescription Monitoring Program Reviewed? [x] yes  [] no    Referrals/Labs were made to:     Completed Charge Capture?  [x] yes  [] no    Future appointment was made: [x] yes  [] no  9/6/17  Dictation completed at time of chart check: [] yes  [x] no    I have checked the documentation for today s encounters and the above information has been reviewed and completed.

## 2021-06-12 NOTE — PROGRESS NOTES
Reason for visit - follow-up and medication refills, not wanting any changes.  Sleep - still tired in am, awakens at night, goes right back to sleep.  Unsure if headaches are the issue.  Depression - moderate  Anxiety - none  Panic attacks - none  SI/HI - no  Therapist - none  Side effects from medication - none      [employer] Date: 17  Query Report Page#: 1  Patient Rx History Report  LIZZIE SHIPLEY  Search Criteria: Last Name 'lizzie' and First Name 'dinah' and  =  and Request Period =  to  ' - 3 out of 3 Recipients Selected.  Fill Date Product, Str, Form Qty Days Pt ID Prescriber Written RX# N/R* Pharm **MED+  ---------- -------------------------------- ------ ---- --------- ---------- ---------- ------------ ----- --------- ------  2017 CLONAZEPAM 1 MG TABLET 30.00 30 [id number] 05/15/2017 [id number] R [id number] 00.0  2017 CLONAZEPAM 1 MG TABLET 29.00 29 [id number] 05/15/2017 [id number] R [id number] 00.0  06/10/2017 CLONAZEPAM 1 MG TABLET 30.00 30 [id number] 05/15/2017 [id number] R [id number] 00.0  *N/R N=New R=Refill  +MED Daily  Prescribers for prescriptions listed  ----------------------------------------------------------------------------------------------------------------------------------  [id number] CHRISTINE SORIANO MD; [address] [facility] [address]  Pharmacies that dispensed prescriptions listed  ----------------------------------------------------------------------------------------------------------------------------------  [id number] [facility]; [address] [facility] [address],  Patients that match search criteria  ----------------------------------------------------------------------------------------------------------------------------------  [id number] LIZZIE MONGE  [birth year]; [address] [facility] [address]  [id number] LIZZIE SHIPLEY  [birth year]; [address],  [facility] [address]  [id number] ISMAEL FLOYD 59; [address] [facility] [address]  MED Summary  This section displays cumulative MED values by unique recipient. The MED Max value is the maximum occurrence of cumulative MED  sustained for any 3 consecutive days. This value is calculated based on prescriptions dispensed during the date range requested.  -----------------------------------------------------------------------------------------------------------------------------------  0 QUINTEN RAPHAEL; 1955; [address] [facility] [address]  **Per CDC guidance, the conversion factors and associated daily morphine milligram equivalents for drugs prescribed as part of  medication-assisted treatment for opioid use disorder should not be used to benchmark against dosage thresholds meant for opioids  prescribed for pain.  Report Disclaimers:  The report provided above is based upon the search criteria and the data provided by the dispensing entities. For more information  about any prescription, please contact the dispenser or the prescriber.  This report contains confidential information, including patient identifiers, and is not a public record. The information on this  report must be treated as protected health information and is to be disclosed to others only as authorized by applicable state  and Federal regulations.

## 2021-06-12 NOTE — PROGRESS NOTES
Correct pharmacy verified with patient and confirmed in snapshot? [x] yes []no    Charge captured ? [x] yes  [] no    Medications Phoned  to Pharmacy [] yes [x]no  Name of Pharmacist:  List Medications, including dose, quantity and instructions      Medication Prescriptions given to patient   [] yes  [x] no   List the name of the drug the prescription was written for.       Medications ordered this visit were e-scribed.  Verified by order class [x] yes  [] no    Medication changes or discontinuations were communicated to patient's pharmacy: [x] yes  [] no  Citalopram    UA collected [] yes  [x] no    [place] Prescription Monitoring Program Reviewed? [x] yes  [] no    Referrals were made to:  Group Home staff will continue to encourage him to use a walker instead a wheelchair.    Future appointment was made: [x] yes  [] no    Dictation completed at time of chart check: [] yes  [x] no    I have checked the documentation for today s encounters and the above information has been reviewed and completed.

## 2021-06-12 NOTE — PROGRESS NOTES
Psychiatric  Progress Note  Date of visit: 9/6/2017         Discussion of Care and Treatment Recommendations:   Tavon is a 62 y.o. 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 He is a resident of the [facility]. He comes for this appointment accompanied by group home staff.    Group home staff member, the patient and I discussed treatment plan and these are  recommendations.     1.Patient will take the medications as prescribed.  Discontinue Celexa  Start Zoloft 50 mg every morning for 1 week then increase 100 mg every morning for depression. Call guardian to approve.  Aricept  10 mg every am  Risperdal 0.5 mg twice a day  Klonopin 1mg every am  Celexa 40 mg every morning  Cogentin 0.5mg 2 times a day  Trazodone 50 -100 mg  At night as needed   Patient will not stop taking medications or adjust them without consulting with the provider.    2.Staff will call with any problems between 2 visits.   3.[facility] staff will take the patient to the emergency room if not feeling safe , unable to function in the community, or if suicidal, homicidal or hearing voices or having paranoia.   4. [facility] staff will watch his diet and exercise.   5.Patient will see non psychiatric providers for non psychiatric disorders.  Dr Harden is his primary MD  6. Next appointment in 1  month  7.  staff will continue to encourage him to use a walker instead a wheelchair           DIagnoses:     Mood disorder due to traumatic brain injury.   Dementia due to traumatic brain injury.   Cocaine abuse in full remission.   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, With Hallucinations     Chronic Obstructive Pulmonary Disease     Essential Hypertension     Headache     Type 2 Diabetes Mellitus - Uncomplicated, Controlled     Alcohol Dependence In Remission     Encephalopathy     Anoxic Brain Damage     Mood disorder due to old head trauma     Epilepsy and Recurrent Seizures     Chronic hepatitis C     Opiate dependence, continuous     Neuroleptic Consent signed on 10/13/15     Follicular lymphoma             Chief Complaint / Subjective:    Chief complaint: Depressed, tired, not sleeping well    History of Present Illness: Tavon says that he is depressed.  He says that he wakes up at night.  He feels tired during the day.  He says that he is anxious.  He says that migraines are back and that is contributing to depression.  Concentration is decreased.  He has no interest in doing things.  He says that he goes to day program and she does not do that much either.  He says he goes there 2 times per week.  He denies suicidal and homicidal ideas.  He denies delusions and hallucinations.  He says appetite is good.  He says he gets along okay with staff and other patients.  He denies side effects of medications.  He lives in supervised setting, so he does not use drugs or alcohol.  He has been symptomatic for some time, so at this time I recommend changing antidepressant.  The staff will discuss that with his guardian.  I will see him in a month.    Past psychiatric history:  Hospitalizations: 2 in 2006  Suicide attempt: Accidental overdose on heroin many years ago  ECT no  Chemical dependency history: Patient had 6 chemical dependency treatments more than 10 years ago    Family history:   Psychiatric: Questionable  Suicide attempt: Questionable  Chemical dependency: Positive  Diabetes: Positive    Social history: Patient has a GED.  No  abuse.  His parents  when he was 20 years old.  He was  and .  He has 2 children.  No contact.  He is on SSDI.  He lives in a group home.  He is his own guardian but he would benefit from having guardian for medical decision-making.  He has conservative for financial decisions      Mental Status Examination:   General: fair hygiene, cooperative  Orientation: To self and place, not in time  Speech: Impaired articulation post traumatic brain injury   Language: Patient is able to name things   Thought process: [place] short responses   Thought content: Devoid of delusions and hallucinations   Suicidal thoughts: Absent   Homicidal thoughts: Absent   Associations: Connected within what he says   Affect: Frustrated l   Mood: Depressed  Intellectual functioning: Decreased due to traumatic brain injury   Memory: Impaired due to traumatic brain injury   Fund of knowledge: Decreased due to traumatic brain injury   Attention and concentration: At his baseline   Gait: Wheelchair-bound   Psychomotor activity: Calm, no agitation   Muscles: No atrophy, no abnormal movements   InSight and judgment: Limited    Medication changes: Yes  Medication adherence: compliant  Medication side effects: absent  Information about medications: Discussed with group home staff inpatient    Psychotherapy: Basic day-to-day support.    Education: Diet, exercise, abstinence from drugs and alcohol,   Lab Results:   Personally reviewed     Lab Results   Component Value Date    WBC 3.3 (L) 02/09/2016    HGB 14.0 02/09/2016    HCT 41.4 02/09/2016     02/09/2016    CHOL 139 02/09/2016    TRIG 111 02/09/2016    HDL 37 (L) 02/09/2016    LDLDIRECT 91.0 06/28/2011    ALT 42 02/09/2016    AST 27 02/09/2016     02/09/2016    K 4.2 02/09/2016     02/09/2016    CREATININE 0.84 02/09/2016    BUN 15 02/09/2016    CO2 32 02/09/2016    TSH 0.99 02/09/2016    PSA 0.77 06/24/2010    HGBA1C 5.9 02/09/2016    MICROALBUR <0.50  "02/09/2016       Vital signs:  /66 (Patient Site: Right Arm, Patient Position: Sitting, Cuff Size: Adult Large)  Pulse 60  Temp 98.2  F (36.8  C) (Oral)   Resp 16  Ht 5' 10\" (1.778 m)    Allergies: Adhesive and Latex         Medications:       Current Outpatient Prescriptions on File Prior to Visit   Medication Sig     acetaminophen (TYLENOL) 500 MG tablet Take 1,000 mg by mouth every 6 (six) hours as needed for pain (Do not exceed 4000mg in 24 hour period).      albuterol (PROVENTIL HFA;VENTOLIN HFA) 90 mcg/actuation inhaler Inhale 1-2 puffs 4 (four) times a day. [id number]     aspirin 81 MG tablet Take 81 mg by mouth daily. Take with food at 8AM     benztropine (COGENTIN) 0.5 MG tablet TAKE 1 TABLET BY MOUTH TWICE DAILY     bisacodyl (DULCOLAX) 5 mg EC tablet Take 10 mg by mouth daily as needed for constipation.     blood glucose test (CONTOUR NEXT STRIPS) strips Test twice daily.  Pharmacy allowed to substitute per patient's insurance. Dx code: E11.9     blood-glucose meter (CONTOUR NEXT EZ METER) Misc Use as directed. Pharmacy allowed to substitute per patient's insurance. Dx code: E11.9     calcium-vitamin D 500 mg(1,250mg) -200 unit per tablet Take 1 tablet by mouth 2 (two) times a day with meals.     citalopram (CELEXA) 20 MG tablet TAKE 2 TABLETS (40MG) BY MOUTH ONCE DAILY     clonazePAM (KLONOPIN) 1 MG tablet TAKE 1 TABLET BY MOUTH EVERY MORNING  **6 TOTAL FILLS*     donepezil (ARICEPT) 10 MG tablet TAKE 1 TABLET BY MOUTH EVERY MORNING     ibuprofen (ADVIL,MOTRIN) 200 MG tablet Take 200 mg by mouth every 6 (six) hours as needed for pain.     lancets (SURE COMFORT LANCETS) 30 gauge Misc Test twice daily.  Pharmacy allowed to substitute per patient's insurance. Dx code: E11.9     levETIRAcetam (KEPPRA XR) 500 mg 24 hr tablet Take 1 tablet (500 mg total) by mouth every morning. (Patient taking differently: Take 1,000 mg by mouth every morning. )     losartan (COZAAR) 50 MG tablet Take 50 mg by " mouth daily with breakfast.     multivitamin (MULTIVITAMIN) per tablet Take 1 tablet by mouth daily.     omeprazole (PRILOSEC) 20 MG capsule Take 20 mg by mouth daily.     ondansetron (ZOFRAN) 4 MG tablet Take 4 mg by mouth every 4 (four) hours as needed for nausea.     REGULOID, SUGAR FREE Powd Take 6 g by mouth daily.      risperiDONE (RISPERDAL) 0.5 MG tablet TAKE 1 TABLET BY MOUTH TWICE DAILY     topiramate (TOPAMAX) 25 MG tablet Take 25 mg by mouth.     traZODone (DESYREL) 50 MG tablet Take  mg by mouth bedtime as needed for sleep.      triamcinolone (KENALOG) 0.1 % cream Apply topically 2 (two) times a day.     [DISCONTINUED] clonazePAM (KLONOPIN) 0.5 MG tablet 1 tablet by mouth daily     SENNOSIDES/DOCUSATE SODIUM (SENEXON-S ORAL) Take 1 tablet by mouth 2 (two) times a day.     [DISCONTINUED] citalopram (CELEXA) 20 MG tablet Take 40 mg by mouth daily.     [DISCONTINUED] metFORMIN (GLUCOPHAGE) 500 MG tablet Take 1,000 mg by mouth 2 (two) times a day with meals.     [DISCONTINUED] oxybutynin (DITROPAN) 5 MG tablet Take 2.5 mg by mouth 2 (two) times a day.     No current facility-administered medications on file prior to visit.           Review of Systems:    Chronic pain,otherwise reminder of review of systems is negative    Coordination of Care:   More than 25 minutes spent on this visit with more than 50% of time spent on coordination of care including: Reviewing medical records, reviewing group home note,  filing  the group home form,coordinating care with group home staff , discussing medications with group home staff and patient, entering orders and preparing documentation for the visit.    This note was created with the help of Dragon dictation system.  All grammatical/typing errors or context distortion  are unintentional.    Samia Hebert MD

## 2021-06-12 NOTE — TELEPHONE ENCOUNTER
Date of Last Office Visit: 8/5/20  Date of Next Office Visit: 2/3/21  No shows since last visit: none  Cancellations since last visit: none  ED visits since last visit:  none    Medication benztropine 0.5mg date last ordered: 4/14/20  Qty: 30  Refills: 5  Medication donepezil 10mg date last ordered: 4/12/20  Qty: 30  Refills: 5  Medication risperidone 0.5mg date last ordered: 4/12/20  Qty: 60 Refills: 5    Lapse in therapy greater than 7 days: no  Medication refill request verified as identical to current order: yes  Result of Last DAM, VPA, Li+ Level, CBC, or Carbamazepine Level (at or since last visit): N/A     [] Medication refilled per [facility] M-1.   [x] Medication unable to be refilled by RN due to criteria not met as indicated below:     []Eligibility - not seen in last year    []Supervision - no future appointment    []Compliance     []Verification - order discrepancy    []Controlled Medication    []Medication not included in RN Protocol    []90 - day supply request    [x]Other- refill 7 days early, multiple refills   Current Medication list:    acetaminophen (TYLENOL) 500 MG tablet Take 500-1,000 mg by mouth every 12 (twelve) hours as needed for pain (Do not exceed 4000mg in 24 hour period).        aspirin 81 MG tablet Take 81 mg by mouth daily. Take with food at 8AM   benztropine (COGENTIN) 0.5 MG tablet Take 1 tablet (0.5 mg total) by mouth daily.   bisacodyl (DULCOLAX) 5 mg EC tablet Take 10 mg by mouth daily as needed for constipation.   blood glucose test (CONTOUR NEXT STRIPS) strips Test twice daily.  Pharmacy allowed to substitute per patient's insurance. Dx code: E11.9   blood-glucose meter (CONTOUR NEXT EZ METER) [facility] Use as directed. Pharmacy allowed to substitute per patient's insurance. Dx code: E11.9   calcium-vitamin D 500 mg(1,250mg) -200 unit per tablet Take 1 tablet by mouth 2 (two) times a day with meals.   donepeziL (ARICEPT) 10 MG tablet TAKE 1 TABLET BY MOUTH EVERY MORNING   gabapentin  (NEURONTIN) 100 MG capsule Take 100 mg by mouth 3 (three) times a day. Prescribed by MILI Ivan   ibuprofen (ADVIL,MOTRIN) 200 MG tablet Take 200-400 mg by mouth every 8 (eight) hours as needed for pain.        lancets (SURE COMFORT LANCETS) 30 gauge Misc Test twice daily.  Pharmacy allowed to substitute per patient's insurance. Dx code: E11.9   levETIRAcetam (KEPPRA XR) 500 mg 24 hr tablet Take 1 tablet (500 mg total) by mouth every morning.   losartan (COZAAR) 50 MG tablet Take 25 mg by mouth daily with breakfast.        metFORMIN (GLUCOPHAGE-XR) 500 MG 24 hr tablet Take 1,000 mg by mouth 2 (two) times a day.        multivitamin (MULTIVITAMIN) per tablet Take 1 tablet by mouth daily.   omeprazole (PRILOSEC) 20 MG capsule Take 20 mg by mouth daily.   ondansetron (ZOFRAN) 4 MG tablet Take 4 mg by mouth every 4 (four) hours as needed for nausea.   oxybutynin (DITROPAN XL) 5 MG ER tablet Take 2.5 mg by mouth 2 (two) times a day.   REGULOID, SUGAR FREE Powd Take 6 g by mouth daily.    risperiDONE (RISPERDAL) 0.5 MG tablet TAKE 1 TABLET BY MOUTH TWICE DAILY   sertraline (ZOLOFT) 100 MG tablet TAKE ONE AND ONE-HALF TABLETS (150MG) BY MOUTH EVERY MORNING  *1 TOTAL FILL*   traZODone (DESYREL) 50 MG tablet Take 1-2 tablets ( mg total) by mouth at bedtime as needed for sleep.   triamcinolone (KENALOG) 0.1 % cream Apply topically 2 (two) times a day.       Medication Plan of Care at last office visit with MD/CNP:    1.Patient will take the medications as prescribed.  Zoloft 150 mg qam for depression  Risperdal 0.5 mg two times a day for mood   Cogentin 0.5 mg daily for involuntary movement   Aricept  10 mg every am for memory loss  Trazodone 50 -100 mg  at night as needed for sleep   Patient will not stop taking medications or adjust them without consulting with the provider.

## 2021-06-13 NOTE — PROGRESS NOTES
Psychiatric  Progress Note  Date of visit: 10/4/2017         Discussion of Care and Treatment Recommendations:   Tavon is a 62 y.o. 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 He is a resident of the [facility]. He comes for this appointment accompanied by group home staff.    Group home staff member, the patient and I discussed treatment plan and these are  recommendations.     1.Patient will take the medications as prescribed.    Zoloft 100 mg every morning for depression.   Aricept  10 mg every am  Risperdal 0.5 mg twice a day  Klonopin 1mg every am  Cogentin 0.5mg 2 times a day  Trazodone 50 -100 mg  at night as needed   Patient will not stop taking medications or adjust them without consulting with the provider.    2.Staff will call with any problems between 2 visits.   3.[facility] staff will take the patient to the emergency room if not feeling safe , unable to function in the community, or if suicidal, homicidal or hearing voices or having paranoia.   4. [facility] staff will watch his diet and exercise.   5.Patient will see non psychiatric providers for non psychiatric disorders.  Dr Harden is his primary MD  6. Next appointment in 1  month  7.  staff will continue to encourage him to use a walker instead a wheelchair           DIagnoses:     Mood disorder due to traumatic brain injury.   Dementia due to traumatic brain injury.   Cocaine abuse in full remission.   Alcohol dependence in full remission    Antisocial  personality disorder by history   History of Traumatic brain injury with subsequent seizures     Principal problem:  Mood disorder due to traumatic brain injury    Patient Active Problem List   Diagnosis     Benign Adenomatous Polyp Of The Large Intestine     Opioid Abuse - In Remission     Cocaine Abuse - In Remission     Psychotic Disorder Due To  General Medical Condition, With Hallucinations     Chronic Obstructive Pulmonary Disease     Essential Hypertension     Headache     Type 2 Diabetes Mellitus - Uncomplicated, Controlled     Alcohol Dependence In Remission     Encephalopathy     Anoxic Brain Damage     Mood disorder due to old head trauma     Epilepsy and Recurrent Seizures     Chronic hepatitis C     Opiate dependence, continuous     Neuroleptic Consent signed on 10/13/15     Follicular lymphoma             Chief Complaint / Subjective:      Chief complaint: depression improving    History of Present Illness: Tavon is here with group home staff.  He has improved after Zoloft was started.  It seems that there was some misunderstanding and he was on Zoloft and Celexa for the last month.  I will discontinue Celexa and his group home staff related to the group home during the interview.  We will have to see how he is going to be on Zoloft only and I will see him in a month and if he is getting worse Zoloft will have to be increased.  He says that he feels better and group home staff confirms that.  He goes to day treatment.  He gets along okay with others in day treatment and at the group home.  The staff says that he sleeps well.  Appetite is good.  He has more energy.  Affect is brighter.  He denies thoughts of hurting himself or others.  He denies delusions and hallucinations.  She takes Aricept.  It seems that it helps with memory.  He likes to watch TV.  He does not use any drugs or alcohol.  He is in supervised setting.  He does not have other medical problems at this point.  Headaches are chronic but they are under better control.    Past psychiatric history:  Hospitalizations: 2 in 2006  Suicide attempt: Accidental overdose on heroin many years ago  ECT no  Chemical dependency history: Patient had 6 chemical dependency treatments more than 10 years ago    Family history:   Psychiatric: Questionable  Suicide attempt: Questionable  Chemical  dependency: Positive  Diabetes: Positive    Social history: Patient has a GED.  No abuse.  His parents  when he was 20 years old.  He was  and .  He has 2 children.  No contact.  He is on SSDI.  He lives in a group home.  He is his own guardian but he would benefit from having guardian for medical decision-making.  He has conservative for financial decisions      Mental Status Examination:   General: fair hygiene, cooperative  Orientation: To self and place, not in time  Speech: Impaired articulation post traumatic brain injury   Language: Patient is able to name things   Thought process: [place] short responses   Thought content: Devoid of delusions and hallucinations   Suicidal thoughts: Absent   Homicidal thoughts: Absent   Associations: Connected within what he says   Affect: Brighter depression improved  Mood: Depressed  Intellectual functioning: Decreased due to traumatic brain injury   Memory: Impaired due to traumatic brain injury   Fund of knowledge: Decreased due to traumatic brain injury   Attention and concentration: At his baseline   Gait: Wheelchair-bound   Psychomotor activity: Calm, no agitation   Muscles: No atrophy, no abnormal movements   InSight and judgment: Limited    Medication changes: Yes  Medication adherence: compliant  Medication side effects: absent  Information about medications: Discussed with group home staff inpatient    Psychotherapy: Basic day-to-day support.    Education: Diet, exercise, abstinence from drugs and alcohol,   Lab Results:   Personally reviewed     Lab Results   Component Value Date    WBC 3.3 (L) 02/09/2016    HGB 14.0 02/09/2016    HCT 41.4 02/09/2016     02/09/2016    CHOL 139 02/09/2016    TRIG 111 02/09/2016    HDL 37 (L) 02/09/2016    LDLDIRECT 91.0 06/28/2011    ALT 42 02/09/2016    AST 27 02/09/2016     02/09/2016    K 4.2 02/09/2016     02/09/2016    CREATININE 0.84 02/09/2016    BUN 15 02/09/2016    CO2 32  "02/09/2016    TSH 0.99 02/09/2016    PSA 0.77 06/24/2010    HGBA1C 5.9 02/09/2016    MICROALBUR <0.50 02/09/2016       Vital signs:  /65 (Patient Site: Right Arm, Patient Position: Sitting, Cuff Size: Adult Regular)  Pulse (!) 57  Temp 97.9  F (36.6  C) (Oral)   Resp 16  Ht 5' 10\" (1.778 m)    Allergies: Adhesive and Latex         Medications:       Current Outpatient Prescriptions on File Prior to Visit   Medication Sig     acetaminophen (TYLENOL) 500 MG tablet Take 1,000 mg by mouth every 6 (six) hours as needed for pain (Do not exceed 4000mg in 24 hour period).      albuterol (PROVENTIL HFA;VENTOLIN HFA) 90 mcg/actuation inhaler Inhale 1-2 puffs 4 (four) times a day. [id number]     aspirin 81 MG tablet Take 81 mg by mouth daily. Take with food at 8AM     benztropine (COGENTIN) 0.5 MG tablet TAKE 1 TABLET BY MOUTH TWICE DAILY     bisacodyl (DULCOLAX) 5 mg EC tablet Take 10 mg by mouth daily as needed for constipation.     blood glucose test (CONTOUR NEXT STRIPS) strips Test twice daily.  Pharmacy allowed to substitute per patient's insurance. Dx code: E11.9     blood-glucose meter (CONTOUR NEXT EZ METER) Misc Use as directed. Pharmacy allowed to substitute per patient's insurance. Dx code: E11.9     calcium-vitamin D 500 mg(1,250mg) -200 unit per tablet Take 1 tablet by mouth 2 (two) times a day with meals.     clonazePAM (KLONOPIN) 1 MG tablet TAKE 1 TABLET BY MOUTH EVERY MORNING  **6 TOTAL FILLS*     donepezil (ARICEPT) 10 MG tablet TAKE 1 TABLET BY MOUTH EVERY MORNING     ibuprofen (ADVIL,MOTRIN) 200 MG tablet Take 200 mg by mouth every 6 (six) hours as needed for pain.     lancets (SURE COMFORT LANCETS) 30 gauge Misc Test twice daily.  Pharmacy allowed to substitute per patient's insurance. Dx code: E11.9     levETIRAcetam (KEPPRA XR) 500 mg 24 hr tablet Take 1 tablet (500 mg total) by mouth every morning. (Patient taking differently: Take 1,000 mg by mouth every morning. )     losartan " (COZAAR) 50 MG tablet Take 50 mg by mouth daily with breakfast.     metFORMIN (GLUCOPHAGE-XR) 500 MG 24 hr tablet Take 1,000 mg by mouth daily with breakfast.     multivitamin (MULTIVITAMIN) per tablet Take 1 tablet by mouth daily.     omeprazole (PRILOSEC) 20 MG capsule Take 20 mg by mouth daily.     ondansetron (ZOFRAN) 4 MG tablet Take 4 mg by mouth every 4 (four) hours as needed for nausea.     oxybutynin (DITROPAN XL) 5 MG ER tablet Take 2.5 mg by mouth 2 (two) times a day.     REGULOID, SUGAR FREE Powd Take 6 g by mouth daily.      risperiDONE (RISPERDAL) 0.5 MG tablet TAKE 1 TABLET BY MOUTH TWICE DAILY     sertraline (ZOLOFT) 100 MG tablet Take one half a pill every am for 1 week, then one pill every am.     topiramate (TOPAMAX) 25 MG tablet Take 25 mg by mouth.     traZODone (DESYREL) 50 MG tablet Take 1-2 tablets ( mg total) by mouth at bedtime as needed for sleep.     triamcinolone (KENALOG) 0.1 % cream Apply topically 2 (two) times a day.     SENNOSIDES/DOCUSATE SODIUM (SENEXON-S ORAL) Take 1 tablet by mouth 2 (two) times a day.     No current facility-administered medications on file prior to visit.           Review of Systems:    History of present illness, chronic headaches, otherwise reminder of review of systems is negative for; is general, eyes, ears, nose, throat, neck, respiratory, cardiovascular, gastrointestinal, genitourinary, meniscal skeletal, neurological, hematological, endocrine and dermatological system    Coordination of Care:   More than 25 minutes spent on this visit with more than 50% of time spent on coordination of care including: Reviewing medical records, reviewing group home note,  filing  the group home form,coordinating care with group home staff , discussing medications with group home staff and patient, entering orders and preparing documentation for the visit.    This note was created with the help of Dragon dictation system.  All grammatical/typing errors or context  distortion  are unintentional.    Samia Hebert MD

## 2021-06-13 NOTE — PROGRESS NOTES
Correct pharmacy verified with patient and confirmed in snapshot? [x] yes []no    Medications Phoned  to Pharmacy [] yes [x]no  Name of Pharmacist:  List Medications, including dose, quantity and instructions      Medication Prescriptions given to patient   [] yes  [x] no   List the name of the drug the prescription was written for.      Medications ordered this visit were e-scribed.  Verified by order class [x] yes  [] no  Zoloft    Medication changes or discontinuations were communicated to patient's pharmacy:  [x] yes  [] no  Pharmacist Spoke With: Favian Pereira    UA toñito [] yes  [x] no    [place] Prescription Monitoring Program Reviewed? [x] yes  [] no    Referrals/Labs were made to:     Completed Charge Capture?  [x] yes  [] no    Future appointment was made: [] yes  [x] no    Dictation completed at time of chart check: [] yes  [x] no    I have checked the documentation for today s encounters and the above information has been reviewed and completed.

## 2021-06-14 NOTE — PROGRESS NOTES
"Telephone Outpatient Psychiatric Progress Note    Date of visit: 2/3/2021    Tavon Arias is a 66 y.o. male who is being evaluated via a billable telephone visit.      The patient has been notified of following:     \"This telephone visit will be conducted via a call between you and your physician/provider. We have found that certain health care needs can be provided without the need for a physical exam.  This service lets us provide the care you need with a short phone conversation.  If a prescription is necessary we can send it directly to your pharmacy.  If lab work is needed we can place an order for that and you can then stop by our lab to have the test done at a later time.    Telephone visits are billed at different rates depending on your insurance coverage. During this emergency period, for some insurers they may be billed the same as an in-person visit.  Please reach out to your insurance provider with any questions.    If during the course of the call the physician/provider feels a telephone visit is not appropriate, you will not be charged for this service.\"    Patient has given verbal consent to a Telephone visit? Yes     Patient would like to receive their AVS by fax  or      Patient's contact phone number:[phone]         Discussion of Care and Treatment Recommendations:       This visit is conducted by phone due to social distancing due to coronavirus.  Tavon is present by the phone and the [facility] staff is present by the phone with Magnus permission to help with getting information.    Tavon is a 66 y.o. 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 He is a resident of the [facility]. He comes for this appointment accompanied by group [facility] staff.    Group home staff member, the patient and I discussed treatment plan and these " are  recommendations.     1.Patient will take the medications as prescribed.  Zoloft 150 mg qam for depression  Risperdal 0.5 mg two times a day for mood   Cogentin 0.5 mg daily for involuntary movement   Aricept  10 mg every am for memory loss  Trazodone 50 -100 mg  at night as needed for sleep   Patient will not stop taking medications or adjust them without consulting with the provider.  2.Staff will call with any problems between 2 visits.   3.[facility] staff will take the patient to the emergency room if not feeling safe  or unable to function in the community, or if suicidal, homicidal or hearing voices or having paranoia.   4. [facility] staff will watch his diet and exercise.   5.Patient will see non psychiatric providers for non psychiatric disorders.   6. Next appointment in 6 months  7.  staff will continue to encourage him to use a walker instead a wheelchair  8. 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  Extrapyramidal symptoms  Cocaine abuse in full remission.   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, With Hallucinations     Chronic Obstructive Pulmonary Disease     Essential Hypertension     Headache     Type 2 Diabetes Mellitus - Uncomplicated, Controlled     Alcohol Dependence In Remission     Encephalopathy     Anoxic Brain Damage     Mood disorder due to old head trauma     Epilepsy  and Recurrent Seizures     Chronic hepatitis C (H)     Opiate dependence, continuous (H)     Neuroleptic Consent signed on 10/13/15     Follicular lymphoma (H)             Chief Complaint / Subjective:      Chief complaint: headache from Covid vaccine yesterday, refusing day treatment, functioning in the group home    History of Present Illness: This visit is conducted by phone due to social distancing due to coronavirus.  Tavon is present by the phone and the group home staff member Christie  is present to help with obtaining information and clarifying some responses.  Tavon says he has headache all the time and now it is worse due to vaccine.   He says sleeping is good.He goes to bed at midnight and he is tired in the morning . He says appetite is good. He has not loose otr gain weight.Energy is low in the morning,better in the afternoon. He does not want to do Day Program and he does not want to do any voluntary work because he does not want to do anything for free. Concentration is decreased. He says he mostly watches TV. He denies suicidal and homicidal ideas. He denies hallucinations and delusions. He does not use drugs and alcohol. He says he gets along ok with others. He says depression is better and anxiety. He says he feels sad because he can not do things due to corona viris and because of his head injury.   His immediate memory is getting worse , but he can remember things from the past. He is oriented to self and he knows he is in . He thinks it January 2020. He had CT scans for lymphoma and his sister goes with him No other visits with family  He has a . I recommend that he should have a guardian.  Group home staff confirms what patient says.  She says that he sometimes gets irritated by other patients and staff can redirect him.  No physical altercation.  She says she tried to involve him in voluntary work or encouraged him to go to day program, but he refuses as he says.      From the  past note: Reviewed and updated  Past psychiatric history:  Hospitalizations: 2 in 2006  Suicide attempt: Accidental overdose on heroin in 2006 and he had anoxic brain injury and chronic headaches.   ECT no  Guns:no  Chemical dependency history: Patient had 6 chemical dependency treatments more than 10 years ago    Family history:   Psychiatric: Questionable  Suicide attempt: Questionable  Chemical dependency: Positive  Diabetes: Positive    Social history: Patient has a GED.    No abuse.    His parents  when he was 20 years old.    He was  and .    He has 2 children.  No contact.    He is on SSDI.  He lives in a group home.    He has [employer] for financial decisions    Mental Status Examination today:   General:  cooperative  Orientation: Oriented to self, completely disoriented in time and its chronic, partially oriented in place  Speech: Impaired articulation due to TBI  Language: normal naming  Thought process: [place]  Thought content: Denies hallucinations and delusions   Suicidal thoughts: Denies  Homicidal thoughts: Denies  Associations: connected   Affect: Frustrated due to headache  Mood: Depressed  Intellectual functioning: Decreased due to TBI  Memory: Decreased due to TBI   Fund of knowledge: Decreased due to TBI   Attention and concentration: Seems to be at baseline  Gait: I cannot assess it because this is phone visit, but the staff says that he uses walker at home  Psychomotor activity: Mild agitation in his voice  Muscles: I cannot assess it because this is phone visit  InSight and judgment: Limited    Medication adherence: compliant  Medication side effects: absent  Information about medications: Discussed with group home staff     Psychotherapy: Basic day-to-day support coping with coronavirus check    Education: Diet, exercise, abstinence from drugs and alcohol,     Lab Results:   Personally reviewed     Lab Results   Component Value Date    WBC 3.3 (L) 02/09/2016     HGB 14.0 02/09/2016    HCT 41.4 02/09/2016     02/09/2016    CHOL 139 02/09/2016    TRIG 111 02/09/2016    HDL 37 (L) 02/09/2016    LDLDIRECT 91.0 06/28/2011    ALT 42 02/09/2016    AST 27 02/09/2016     02/09/2016    K 4.2 02/09/2016     02/09/2016    CREATININE 0.84 02/09/2016    BUN 15 02/09/2016    CO2 32 02/09/2016    TSH 0.99 02/09/2016    PSA 0.77 06/24/2010    HGBA1C 5.9 02/09/2016    MICROALBUR <0.50 02/09/2016       Vital signs:  There were no vitals taken for this visit., because this is a phone visit     Allergies: Adhesive and Latex         Medications:       Current Outpatient Medications on File Prior to Visit   Medication Sig     acetaminophen (TYLENOL) 500 MG tablet Take 500-1,000 mg by mouth every 12 (twelve) hours as needed for pain (Do not exceed 4000mg in 24 hour period).            aspirin 81 MG tablet Take 81 mg by mouth daily. Take with food at 8AM     benztropine (COGENTIN) 0.5 MG tablet TAKE 1 TABLET BY MOUTH ONCE DAILY     bisacodyl (DULCOLAX) 5 mg EC tablet Take 10 mg by mouth daily as needed for constipation.     blood glucose test (CONTOUR NEXT STRIPS) strips Test twice daily.  Pharmacy allowed to substitute per patient's insurance. Dx code: E11.9     blood-glucose meter (CONTOUR NEXT EZ METER) Misc Use as directed. Pharmacy allowed to substitute per patient's insurance. Dx code: E11.9     calcium-vitamin D 500 mg(1,250mg) -200 unit per tablet Take 1 tablet by mouth 2 (two) times a day with meals.     donepeziL (ARICEPT) 10 MG tablet TAKE 1 TABLET BY MOUTH EVERY MORNING.     gabapentin (NEURONTIN) 100 MG capsule Take 100 mg by mouth 3 (three) times a day. Prescribed by MILI Ivan     ibuprofen (ADVIL,MOTRIN) 200 MG tablet Take 200-400 mg by mouth every 8 (eight) hours as needed for pain.            lancets (SURE COMFORT LANCETS) 30 gauge Misc Test twice daily.  Pharmacy allowed to substitute per patient's insurance. Dx code: E11.9     levETIRAcetam (KEPPRA XR) 500 mg  24 hr tablet Take 1 tablet (500 mg total) by mouth every morning. (Patient taking differently: Take 500 mg by mouth 2 (two) times a day.       )     losartan (COZAAR) 50 MG tablet Take 25 mg by mouth daily with breakfast.            metFORMIN (GLUCOPHAGE-XR) 500 MG 24 hr tablet Take 1,000 mg by mouth 2 (two) times a day.            multivitamin (MULTIVITAMIN) per tablet Take 1 tablet by mouth daily.     omeprazole (PRILOSEC) 20 MG capsule Take 20 mg by mouth daily.     ondansetron (ZOFRAN) 4 MG tablet Take 4 mg by mouth every 4 (four) hours as needed for nausea.     oxybutynin (DITROPAN XL) 5 MG ER tablet Take 2.5 mg by mouth 2 (two) times a day.     REGULOID, SUGAR FREE Powd Take 6 g by mouth daily.      risperiDONE (RISPERDAL) 0.5 MG tablet TAKE 1 TABLET BY MOUTH TWICE DAILY.     sertraline (ZOLOFT) 100 MG tablet TAKE ONE AND ONE-HALF TABLETS (150MG) BY MOUTH EVERY MORNING  *1 TOTAL FILL*     traZODone (DESYREL) 50 MG tablet Take 1-2 tablets ( mg total) by mouth at bedtime as needed for sleep.     triamcinolone (KENALOG) 0.1 % cream Apply topically 2 (two) times a day.     No current facility-administered medications on file prior to visit.           Review of Systems:    Chronic headache and neck pain, otherwise remainder review of systems is negative for: General, eyes, ears, nose, throat, neck, respiratory, cardiovascular, gastrointestinal, genitourinary, musculoskeletal, neurological, hematological and endocrine system.    Total time 24 minutes.  Spent on this visit with 20 minutes spent with the patient and his  on the phone discussing his symptoms, diagnosis, treatment, diet, exercise, benefit of day treatment, providing supportive therapy regarding above symptoms.  For additional minutes spent on coordination of care with nurse, orders and documentation    This note was created with the help of Dragon dictation system.  All grammatical/typing errors or context distortion  are  unintentional.    Samia Hebert MD

## 2021-06-14 NOTE — PATIENT INSTRUCTIONS - HE
1.Patient will take the medications as prescribed.  Zoloft 150 mg qam for depression  Risperdal 0.5 mg two times a day for mood   Cogentin 0.5 mg daily for involuntary movement   Aricept  10 mg every am for memory loss  Trazodone 50 -100 mg  at night as needed for sleep   Patient will not stop taking medications or adjust them without consulting with the provider.  2.Staff will call with any problems between 2 visits.   3.correction staff will take the patient to the emergency room if not feeling safe  or unable to function in the community, or if suicidal, homicidal or hearing voices or having paranoia.   4. correction staff will watch his diet and exercise.   5.Patient will see non psychiatric providers for non psychiatric disorders.   6. Next appointment in 6 months  7.  staff will continue to encourage him to use a walker instead a wheelchair  8. I recommend that his sister becomes his gardian due to worsening cognitive abilities and TBI

## 2021-06-14 NOTE — PROGRESS NOTES
________________________________________  Medications Phoned  to Pharmacy [] yes [x]no  Name of Pharmacist:  List Medications, including dose, quantity and instructions    Medications ordered this visit were e-scribed.  Verified by order class [x] yes  [] no  Trazodone 50 mg    Medication changes or discontinuations were communicated to patient's pharmacy: [] yes  [x] no    Dictation completed at time of chart check: [x] yes  [] no    I have checked the documentation for today s encounters and the above information has been reviewed and completed.

## 2021-06-15 NOTE — TELEPHONE ENCOUNTER
Catie GEORGE Would like a visit summary sent to her. She advised that the patient did not share information about his visit today, they need to know if there was a med change or anything in that matter.     Sched 6month follow up August 3rd with Emilee

## 2021-06-16 NOTE — PROGRESS NOTES
Pt here for psychiatry follow up. Pt states he is doing well mood wise. Pt states he is having aches and pains. Staff from pt's group home states pt is doing well.      Correct pharmacy verified with patient and confirmed in snapshot? [x] yes []no    Medications Phoned  to Pharmacy [] yes [x]no  Name of Pharmacist:  List Medications, including dose, quantity and instructions      Medication Prescriptions given to patient   [] yes  [x] no   List the name of the drug the prescription was written for.      Medications ordered this visit were e-scribed.  Verified by order class [x] yes  [] no  Cogentin, Aricept, Risperdal, Zoloft, Trazodone    Medication changes or discontinuations were communicated to patient's pharmacy:  [] yes  [x] no  Pharmacist Spoke With:     UA collected [] yes  [x] no    [place] Pres[place] Monitoring [place] Reviewed? [x] yes  [] no    Referrals/Labs were made to:     Completed Charge Capture?  [x] yes  [] no    Future appointment was made: [x] yes  [] no  18  Dictation completed at time of chart check: [x] yes  [] no    I have checked the documentation for today s encounters and the above information has been reviewed and completed.        LIZZIE SHIPLEY  Search Criteria: Last Name 'lizzie' and First Name 'dinah' and  = ' and Request Period = '17' to  '18' - 3 out of 3 Recipients Selected.  Fill Date Product, Str, Form Qty Days Pt ID Prescriber Written RX# N/R* Pharm **MED+  ---------- -------------------------------- ------ ---- --------- ---------- ---------- ------------ ----- --------- ------  2018 CLONAZEPAM 1 MG TABLET 30.00 30 [id number] 2018 [id number] N [id number] 00.0  2017 CLONAZEPAM 1 MG TABLET 30.00 30 [id number] 2017 [id number] R [id number] 00.0  2017 CLONAZEPAM 1 MG TABLET 30.00 30 [id number] 2017 [id number] N [id number] 00.0  2017 CLONAZEPAM 1 MG TABLET 7.00 7 [id number]  2017 [id number] N [id number] 00.0  10/27/2017 CLONAZEPAM 1 MG TABLET 30.00 30 [id number] 05/15/2017 [id number] R [id number] 00.0  2017 CLONAZEPAM 1 MG TABLET 30.00 30 [id number] 05/15/2017 [id number] R [id number] 00.0  *N/R N=New R=Refill  +MED Daily  Prescribers for prescriptions listed  ----------------------------------------------------------------------------------------------------------------------------------  [id number] CHRISTINE SORIANO MD; [address] [facility] [address]  Pharmacies that dispensed prescriptions listed  ----------------------------------------------------------------------------------------------------------------------------------  [id number] [facility]; [address] [facility] [address],  Patients that match search criteria  ----------------------------------------------------------------------------------------------------------------------------------  [id number] DONAVON MONGE,  59; [address] [place] [id number]  [id number] DONAVON SHIPLEY  [birth year]; [facility] [address], [place] [id number]  [id number] DONAVON QUINTEN  59; [facility] [address], [place] [address]  MED Summary  This section displays cumulative MED values by unique recipient. The MED Max value is the maximum occurrence of cumulative MED  sustained for any 3 consecutive days. This value is calculated based on prescriptions dispensed during the date range requested.  -----------------------------------------------------------------------------------------------------------------------------------  0 QUINTEN RAPHAEL; 1955[facility] [address] [facility] [address]

## 2021-06-16 NOTE — PROGRESS NOTES
Psychiatric  Progress Note  Date of visit: 2/20/2018         Discussion of Care and Treatment Recommendations:   Tavon is a 63 y.o. 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 He is a resident of the [facility]. He comes for this appointment accompanied by group home staff.    Group home staff member, the patient and I discussed treatment plan and these are  recommendations.     1.Patient will take the medications as prescribed.    Zoloft 100 mg every morning for depression.   Aricept  10 mg every am  Risperdal 0.5 mg twice a day  Klonopin 1mg every am  Cogentin 0.5mg 2 times a day  Trazodone 50 -100 mg  at night as needed   Patient will not stop taking medications or adjust them without consulting with the provider.    2.Staff will call with any problems between 2 visits.   3.[facility] staff will take the patient to the emergency room if not feeling safe , unable to function in the community, or if suicidal, homicidal or hearing voices or having paranoia.   4. [facility] staff will watch his diet and exercise.   5.Patient will see non psychiatric providers for non psychiatric disorders.   6. Next appointment in 3  month  7.  staff will continue to encourage him to use a walker instead a wheelchair           DIagnoses:     Mood disorder due to traumatic brain injury.   Dementia due to traumatic brain injury.   Cocaine abuse in full remission.   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, With  Hallucinations     Chronic Obstructive Pulmonary Disease     Essential Hypertension     Headache     Type 2 Diabetes Mellitus - Uncomplicated, Controlled     Alcohol Dependence In Remission     Encephalopathy     Anoxic Brain Damage     Mood disorder due to old head trauma     Epilepsy and Recurrent Seizures     Chronic hepatitis C     Opiate dependence, continuous     Neuroleptic Consent signed on 10/13/15     Follicular lymphoma             Chief Complaint / Subjective:      Chief complaint: Appears somewhat irritable and angry, but he says he is not    History of Present Illness: Tavon comes for this appointment accompanied by group home staff.  He appears somewhat irritable and angry, but he says he is not.  He says that he has headache and he wants oxycodone and fentanyl.  The staff says that he usually mentions these headaches when he comes to the office, but he does not complain about that at home.  We discussed his history of opiate dependency in the past, so appears probably would not be the best option for him.  The staff can give him ibuprofen or Tylenol.  He says that sometimes he hears voices, but he does not elaborate.  When I asked him if they tell him to hurt himself or others, he says now.  The group home staff says that he does not look as if he responds to anything at the group home.  He denies paranoia.  He denies suicidal thoughts and thoughts of hurting others.  He says he gets along with people at the group home.  Staff member says he lives with 3 other people and they get along.  He goes to day treatment daily from 7:30 to 4 PM.  He does arts and crafts and he says he likes it.  He says that his sister and his father and stepmother take him home and he likes to spend time with them.  He says depression improved.  Sleep is normal.  Appetite is normal.  Concentration is at baseline energy is at baseline.  No side effects of medications.  He lives in supervised setting so he does not use any  drugs or alcohol.  Staff takes him to his primary care appointments.  He has history of traumatic brain injury and chronic headaches.    Past psychiatric history:  Hospitalizations: 2 in 2006  Suicide attempt: Accidental overdose on heroin many years ago  ECT no  Chemical dependency history: Patient had 6 chemical dependency treatments more than 10 years ago    Family history:   Psychiatric: Questionable  Suicide attempt: Questionable  Chemical dependency: Positive  Diabetes: Positive    Social history: Patient has a GED.  No abuse.  His parents  when he was 20 years old.  He was  and .  He has 2 children.  No contact.  He is on SSDI.  He lives in a group home.  He is his own guardian but he would benefit from having guardian for medical decision-making.  He has conservative for financial decisions      Mental Status Examination:   General: fair hygiene, cooperative  Orientation: To self and place, not in time  Speech: Impaired articulation post traumatic brain injury   Language: Patient is able to name things   Thought process: [place] short responses   Thought content: He says he hears some voices but he cannot elaborate, no delusions  Suicidal thoughts: Absent   Homicidal thoughts: Absent   Associations: Connected within what he says   Affect: Appears somewhat irritable and angry, but he says he is not  Mood: Depression improved  Intellectual functioning: Decreased due to traumatic brain injury   Memory: Impaired due to traumatic brain injury   Fund of knowledge: Decreased due to traumatic brain injury   Attention and concentration: At his baseline   Gait: Wheelchair-bound   Psychomotor activity: Calm, no agitation   Muscles: No atrophy, no abnormal movements   InSight and judgment: Limited    Medication adherence: compliant  Medication side effects: absent  Information about medications: Discussed with group home staff inpatient    Psychotherapy: Basic day-to-day support.    Education:  "Diet, exercise, abstinence from drugs and alcohol,   Lab Results:   Personally reviewed     Lab Results   Component Value Date    WBC 3.3 (L) 02/09/2016    HGB 14.0 02/09/2016    HCT 41.4 02/09/2016     02/09/2016    CHOL 139 02/09/2016    TRIG 111 02/09/2016    HDL 37 (L) 02/09/2016    LDLDIRECT 91.0 06/28/2011    ALT 42 02/09/2016    AST 27 02/09/2016     02/09/2016    K 4.2 02/09/2016     02/09/2016    CREATININE 0.84 02/09/2016    BUN 15 02/09/2016    CO2 32 02/09/2016    TSH 0.99 02/09/2016    PSA 0.77 06/24/2010    HGBA1C 5.9 02/09/2016    MICROALBUR <0.50 02/09/2016       Vital signs:  /73 (Patient Site: Right Arm, Patient Position: Sitting, Cuff Size: Adult Regular)  Pulse 67  Temp 98.9  F (37.2  C) (Oral)   Resp 16  Ht 5' 10\" (1.778 m)    Allergies: Adhesive and Latex         Medications:       Current Outpatient Prescriptions on File Prior to Visit   Medication Sig     acetaminophen (TYLENOL) 500 MG tablet Take 1,000 mg by mouth every 6 (six) hours as needed for pain (Do not exceed 4000mg in 24 hour period).      albuterol (PROVENTIL HFA;VENTOLIN HFA) 90 mcg/actuation inhaler Inhale 1-2 puffs 4 (four) times a day. [id number]     aspirin 81 MG tablet Take 81 mg by mouth daily. Take with food at 8AM     benztropine (COGENTIN) 0.5 MG tablet TAKE 1 TABLET BY MOUTH TWICE DAILY     bisacodyl (DULCOLAX) 5 mg EC tablet Take 10 mg by mouth daily as needed for constipation.     blood glucose test (CONTOUR NEXT STRIPS) strips Test twice daily.  Pharmacy allowed to substitute per patient's insurance. Dx code: E11.9     blood-glucose meter (CONTOUR NEXT EZ METER) [facility] Use as directed. Pharmacy allowed to substitute per patient's insurance. Dx code: E11.9     calcium-vitamin D 500 mg(1,250mg) -200 unit per tablet Take 1 tablet by mouth 2 (two) times a day with meals.     clonazePAM (KLONOPIN) 1 MG tablet TAKE 1 TABLET BY MOUTH EVERY MORNING *2 TOTAL FILLS*     donepezil (ARICEPT) 10 " MG tablet TAKE 1 TABLET BY MOUTH EVERY MORNING     ibuprofen (ADVIL,MOTRIN) 200 MG tablet Take 200 mg by mouth every 6 (six) hours as needed for pain.     lancets (SURE COMFORT LANCETS) 30 gauge Misc Test twice daily.  Pharmacy allowed to substitute per patient's insurance. Dx code: E11.9     levETIRAcetam (KEPPRA XR) 500 mg 24 hr tablet Take 1 tablet (500 mg total) by mouth every morning. (Patient taking differently: Take 1,000 mg by mouth every morning. )     losartan (COZAAR) 50 MG tablet Take 50 mg by mouth daily with breakfast.     metFORMIN (GLUCOPHAGE-XR) 500 MG 24 hr tablet Take 1,000 mg by mouth daily with breakfast.     multivitamin (MULTIVITAMIN) per tablet Take 1 tablet by mouth daily.     omeprazole (PRILOSEC) 20 MG capsule Take 20 mg by mouth daily.     ondansetron (ZOFRAN) 4 MG tablet Take 4 mg by mouth every 4 (four) hours as needed for nausea.     oxybutynin (DITROPAN XL) 5 MG ER tablet Take 2.5 mg by mouth 2 (two) times a day.     REGULOID, SUGAR FREE Powd Take 6 g by mouth daily.      risperiDONE (RISPERDAL) 0.5 MG tablet TAKE 1 TABLET BY MOUTH TWICE DAILY     SENNOSIDES/DOCUSATE SODIUM (SENEXON-S ORAL) Take 1 tablet by mouth 2 (two) times a day.     sertraline (ZOLOFT) 100 MG tablet Take 1 tablet (100 mg total) by mouth daily.     topiramate (TOPAMAX) 25 MG tablet Take 25 mg by mouth.     traZODone (DESYREL) 50 MG tablet Take 1-2 tablets ( mg total) by mouth at bedtime as needed for sleep.     triamcinolone (KENALOG) 0.1 % cream Apply topically 2 (two) times a day.     No current facility-administered medications on file prior to visit.           Review of Systems:    History of present illness, chronic headaches, otherwise reminder of review of systems is negative for; is general, eyes, ears, nose, throat, neck, respiratory, cardiovascular, gastrointestinal, genitourinary, meniscal skeletal, neurological, hematological, endocrine and dermatological system    Coordination of Care:   More  than 25 minutes spent on this visit with more than 50% of time spent on coordination of care including: Reviewing medical records, coordinating care with group home staff , providing supportive therapy regarding above issues, discussing medications with group home staff and patient, entering orders and preparing documentation for the visit.    This note was created with the help of Dragon dictation system.  All grammatical/typing errors or context distortion  are unintentional.    Samia Hebert MD

## 2021-06-17 NOTE — TELEPHONE ENCOUNTER
Date of Last Office Visit: 2/3/21  Date of Next Office Visit: 8/3/21  No shows since last visit: none  Cancellations since last visit: none    Medication requested: sertraline 100mg  Date last ordered: 4/12/20 Qty: 45 Refills: 11     Lapse in medication adherence greater than 5 days?: no  If yes, call patient and gather details:    Medication refill request verified as identical to current order?: yes  Result of Last DAM, VPA, Li+ Level, CBC, or Carbamazepine Level (at or since last visit): N/A    [x]Medication refilled per  Medication Refill in Ambulatory Care  policy.  []Medication unable to be refilled by RN due to criteria not met as indicated below:    []Eligibility - not seen in the last year   []Supervision - no future appointment   []Compliance - no shows, cancellations or lapse in therapy   []Verification - order discrepancy   []Controlled medication   []Medication not included in policy   []90-day supply request   []Other

## 2021-06-18 NOTE — PROGRESS NOTES
Psychiatric  Progress Note  Date of visit: 6/19/2018         Discussion of Care and Treatment Recommendations:     Tavon is a 63 y.o. 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 He is a resident of the [facility]. He comes for this appointment accompanied by group home staff.    Group home staff member, the patient and I discussed treatment plan and these are  recommendations.     1.Patient will take the medications as prescribed.  Discontinued Klonopin by PMD because patient was not taking it.   Zoloft 100 mg every morning for depression.   Aricept  10 mg every am  Risperdal 0.5 mg twice a day  Cogentin 0.5mg 2 times a day  Trazodone 50 -100 mg  at night as needed   Patient will not stop taking medications or adjust them without consulting with the provider.    2.Staff will call with any problems between 2 visits.   3.[facility] staff will take the patient to the emergency room if not feeling safe  or unable to function in the community, or if suicidal, homicidal or hearing voices or having paranoia.   4. [facility] staff will watch his diet and exercise.   5.Patient will see non psychiatric providers for non psychiatric disorders.   6. Next appointment in 3  month  7.  staff will continue to encourage him to use a walker instead a wheelchair  8. Patient needs guardian for medical decision making.His sister should start guardianship process.          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   Dementia due to traumatic brain injury.   Cocaine abuse in full remission.   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    Patient Active Problem List   Diagnosis     Benign Adenomatous Polyp Of  The Large Intestine     Opioid Abuse - In Remission     Cocaine Abuse - In Remission     Psychotic Disorder Due To General Medical Condition, With Hallucinations     Chronic Obstructive Pulmonary Disease     Essential Hypertension     Headache     Type 2 Diabetes Mellitus - Uncomplicated, Controlled     Alcohol Dependence In Remission     Encephalopathy     Anoxic Brain Damage     Mood disorder due to old head trauma (H)     Epilepsy and Recurrent Seizures     Chronic hepatitis C (H)     Opiate dependence, continuous (H)     Neuroleptic Consent signed on 10/13/15     Follicular lymphoma (H)             Chief Complaint / Subjective:      Chief complaint: functioning in the community and response to medications     History of Present Illness: Tavon comes for this appointment accompanied by group home staff. He appears calmer. He walks more. He does physical therapy. He tries to get up in the office, but he sais his legs are weak, so he sat danielle. Staff says they give him coffee as a reward and he goes to physical therapy.  He goes to day treatment 3 days a week  from 7:30 to 4 PM.  He does arts and crafts and he  likes it.His sister and his father and stepmother and mother take him home and he likes to spend time with them.He was with them last weekend. Energy at baseline. Concentration at baseline. Sleep and appetite normal. He denies suicidal and homicidal ideas. He says he takes medications to prevent it. He denies delusions and hallucinations.He gets along with his housemates and with staff.He says he has been  depressed all the time since head injury, but he found a way to cope with it.He talks about wanting opiates for headaches. Staff says he only talks about it when he goes to a doctor, but he does not complain about it at home .He needs guardian for medical decision making. I recommended this in the past , but nothing has been done yet. staff says that his sister oversees his medications. She should start  guardianship process.     Past psychiatric history:  Hospitalizations: 2 in 2006  Suicide attempt: Accidental overdose on heroin many years ago  ECT no  Chemical dependency history: Patient had 6 chemical dependency treatments more than 10 years ago    Family history:   Psychiatric: Questionable  Suicide attempt: Questionable  Chemical dependency: Positive  Diabetes: Positive    Social history: Patient has a GED.  No abuse.  His parents  when he was 20 years old.  He was  and .  He has 2 children.  No contact.  He is on SSDI.  He lives in a group home.  He is his own guardian but he would benefit from having guardian for medical decision-making.  He has conservator for financial decisions      Mental Status Examination:   General: fair hygiene, cooperative  Orientation: To self and partially in place, not in time  Speech: Impaired articulation post traumatic brain injury   Language: Patient is able to name things   Thought process: [place]   Thought content: He says he hears some voices but he cannot elaborate, no delusions  Suicidal thoughts: Absent   Homicidal thoughts: Absent   Associations: Connected within what he says   Affect: constricted  Mood: Depression improved  Intellectual functioning: Decreased due to traumatic brain injury   Memory: Impaired due to traumatic brain injury   Fund of knowledge: Decreased due to traumatic brain injury   Attention and concentration: At his baseline   Gait: Wheelchair-bound   Psychomotor activity: Calm, no agitation   Muscles: No atrophy, no abnormal movements   InSight and judgment: Limited    Medication adherence: compliant  Medication side effects: absent  Information about medications: Discussed with group home staff inpatient    Psychotherapy: Basic day-to-day support.    Education: Diet, exercise, abstinence from drugs and alcohol,   Lab Results:   Personally reviewed     Lab Results   Component Value Date    WBC 3.3 (L) 02/09/2016    HGB 14.0  "02/09/2016    HCT 41.4 02/09/2016     02/09/2016    CHOL 139 02/09/2016    TRIG 111 02/09/2016    HDL 37 (L) 02/09/2016    LDLDIRECT 91.0 06/28/2011    ALT 42 02/09/2016    AST 27 02/09/2016     02/09/2016    K 4.2 02/09/2016     02/09/2016    CREATININE 0.84 02/09/2016    BUN 15 02/09/2016    CO2 32 02/09/2016    TSH 0.99 02/09/2016    PSA 0.77 06/24/2010    HGBA1C 5.9 02/09/2016    MICROALBUR <0.50 02/09/2016       Vital signs:  /64 (Patient Site: Right Arm, Patient Position: Sitting, Cuff Size: Adult Regular)  Pulse (!) 57  Temp 98.5  F (36.9  C) (Oral)   Ht 5' 10\" (1.778 m)    Allergies: Adhesive and Latex         Medications:       Current Outpatient Prescriptions on File Prior to Visit   Medication Sig     acetaminophen (TYLENOL) 500 MG tablet Take 1,000 mg by mouth every 6 (six) hours as needed for pain (Do not exceed 4000mg in 24 hour period).      aspirin 81 MG tablet Take 81 mg by mouth daily. Take with food at 8AM     benztropine (COGENTIN) 0.5 MG tablet TAKE 1 TABLET BY MOUTH TWICE DAILY     bisacodyl (DULCOLAX) 5 mg EC tablet Take 10 mg by mouth daily as needed for constipation.     blood glucose test (CONTOUR NEXT STRIPS) strips Test twice daily.  Pharmacy allowed to substitute per patient's insurance. Dx code: E11.9     blood-glucose meter (CONTOUR NEXT EZ METER) Misc Use as directed. Pharmacy allowed to substitute per patient's insurance. Dx code: E11.9     calcium-vitamin D 500 mg(1,250mg) -200 unit per tablet Take 1 tablet by mouth 2 (two) times a day with meals.     donepezil (ARICEPT) 10 MG tablet TAKE 1 TABLET BY MOUTH EVERY MORNING     ibuprofen (ADVIL,MOTRIN) 200 MG tablet Take 200 mg by mouth every 6 (six) hours as needed for pain.     lancets (SURE COMFORT LANCETS) 30 gauge Misc Test twice daily.  Pharmacy allowed to substitute per patient's insurance. Dx code: E11.9     levETIRAcetam (KEPPRA XR) 500 mg 24 hr tablet Take 1 tablet (500 mg total) by mouth every " morning. (Patient taking differently: Take 1,000 mg by mouth every morning. )     losartan (COZAAR) 50 MG tablet Take 50 mg by mouth daily with breakfast.     metFORMIN (GLUCOPHAGE-XR) 500 MG 24 hr tablet Take 1,000 mg by mouth daily with breakfast.     multivitamin (MULTIVITAMIN) per tablet Take 1 tablet by mouth daily.     omeprazole (PRILOSEC) 20 MG capsule Take 20 mg by mouth daily.     ondansetron (ZOFRAN) 4 MG tablet Take 4 mg by mouth every 4 (four) hours as needed for nausea.     oxybutynin (DITROPAN XL) 5 MG ER tablet Take 2.5 mg by mouth 2 (two) times a day.     REGULOID, SUGAR FREE Powd Take 6 g by mouth daily.      risperiDONE (RISPERDAL) 0.5 MG tablet TAKE 1 TABLET BY MOUTH TWICE DAILY     sertraline (ZOLOFT) 100 MG tablet Take 1 tablet (100 mg total) by mouth daily.     topiramate (TOPAMAX) 25 MG tablet Take 25 mg by mouth.     traZODone (DESYREL) 50 MG tablet Take 1-2 tablets ( mg total) by mouth at bedtime as needed for sleep.     triamcinolone (KENALOG) 0.1 % cream Apply topically 2 (two) times a day.     SENNOSIDES/DOCUSATE SODIUM (SENEXON-S ORAL) Take 1 tablet by mouth 2 (two) times a day.     [DISCONTINUED] albuterol (PROVENTIL HFA;VENTOLIN HFA) 90 mcg/actuation inhaler Inhale 1-2 puffs 4 (four) times a day. [id number]     [DISCONTINUED] clonazePAM (KLONOPIN) 1 MG tablet TAKE 1 TABLET BY MOUTH EVERY MORNING *2 TOTAL FILLS*     [DISCONTINUED] donepezil (ARICEPT) 10 MG tablet Take 10 mg by mouth.     [DISCONTINUED] metFORMIN (GLUCOPHAGE-XR) 500 MG 24 hr tablet Take four tablets daily with food     [DISCONTINUED] ondansetron (ZOFRAN) 4 MG tablet Take 4 mg by mouth.     No current facility-administered medications on file prior to visit.           Review of Systems:    History of present illness, chronic headaches, otherwise reminder of review of systems is negative for; is general, eyes, ears, nose, throat, neck, respiratory, cardiovascular, gastrointestinal, genitourinary, meniscal  skeletal, neurological, hematological, endocrine and dermatological system    Coordination of Care:   More than 25 minutes spent on this visit with more than 50% of time spent on coordination of care including: Reviewing medical records, coordinating care with group home staff , providing supportive therapy regarding above issues, discussing medications with group home staff and patient, entering orders and preparing documentation for the visit.    This note was created with the help of Dragon dictation system.  All grammatical/typing errors or context distortion  are unintentional.    Samia Hebert MD

## 2021-06-20 NOTE — PROGRESS NOTES
Psychiatric  Progress Note  Date of visit: 10/3/2018         Discussion of Care and Treatment Recommendations:     Tavon is a 63 y.o. 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 He is a resident of the [facility]. He comes for this appointment accompanied by group home staff.    Group home staff member, the patient and I discussed treatment plan and these are  recommendations.     1.Patient will take the medications as prescribed.    Zoloft 100 mg every morning for depression.   Aricept  10 mg every am  Risperdal 0.5 mg twice a day  Cogentin 0.5mg 2 times a day  Trazodone 50 -100 mg  at night as needed   Patient will not stop taking medications or adjust them without consulting with the provider.    2.Staff will call with any problems between 2 visits.   3.[facility] staff will take the patient to the emergency room if not feeling safe  or unable to function in the community, or if suicidal, homicidal or hearing voices or having paranoia.   4. [facility] staff will watch his diet and exercise.   5.Patient will see non psychiatric providers for non psychiatric disorders.   6. Next appointment in 3  month  7.  staff will continue to encourage him to use a walker instead a wheelchair  8. Patient needs guardian for medical decision making.His sister should start guardianship process.          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   Dementia due to traumatic brain injury.   Cocaine abuse in full remission.   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, With Hallucinations     Chronic Obstructive Pulmonary Disease     Essential Hypertension     Headache     Type 2 Diabetes Mellitus - Uncomplicated, Controlled     Alcohol Dependence In Remission     Encephalopathy     Anoxic Brain Damage     Mood disorder due to old head trauma     Epilepsy and Recurrent Seizures     Chronic hepatitis C (H)     Opiate dependence, continuous (H)     Neuroleptic Consent signed on 10/13/15     Follicular lymphoma (H)             Chief Complaint / Subjective:      Chief complaint: more active, walking, loosing weight    History of Present Illness: Patient is here   accompanied by group home staff. He says he feels sad because his world has not change. He looks more alert and more engaging. He sleep ok. Appetite is good. Watching what he eats. He has more energy. Concentration has imprroved. He is oriented to self and place and situation. He is not oriented in time. He goes to Day Program 4 times a week. He is there for 8 hours. He says he watches TV when he comes home. He says he gets along well with other people.   He has contact with his family and he spends time with them from time to time. He does not use drugs and alcohol. He is supervised 24 hours. He says he would drink if he could.   He overdosed on heroin in 2006 and he had anoxic brain injury. He would like opiates. He says he has a headache. The staff says he does not complain about t at home.  I explained to him why he cannot have opiates.  He does not care.  He wants Tylenol No. 3.  He is able to do activities of daily living with some help from the staff.    Past psychiatric history:  Hospitalizations: 2 in 2006  Suicide attempt: Accidental overdose on heroin many years ago  ECT no  Chemical dependency history: Patient had 6 chemical dependency treatments more than 10 years ago    Family history:   Psychiatric: Questionable  Suicide attempt:  Questionable  Chemical dependency: Positive  Diabetes: Positive    Social history: Patient has a GED.  No abuse.  His parents  when he was 20 years old.  He was  and .  He has 2 children.  No contact.  He is on SSDI.  He lives in a group home.  He is his own guardian but he would benefit from having guardian for medical decision-making.  He has conservator for financial decisions      Mental Status Examination:   General: fair hygiene, cooperative  Orientation: To self and partially in place, not in time  Speech: Impaired articulation post traumatic brain injury   Language: Patient is able to name things   Thought process: [place]   Thought content: He says he hears some voices but he cannot elaborate, no delusions  Suicidal thoughts: Absent   Homicidal thoughts: Absent   Associations: Connected within what he says   Affect: constricted  Mood: Depression improving  Intellectual functioning: Decreased due to traumatic brain injury   Memory: Impaired due to traumatic brain injury   Fund of knowledge: Decreased due to traumatic brain injury   Attention and concentration: At his baseline   Gait: Wheelchair-bound   Psychomotor activity: Calm, no agitation   Muscles: No atrophy, no abnormal movements   InSight and judgment: Limited    Medication adherence: compliant  Medication side effects: absent  Information about medications: Discussed with group home staff inpatient    Psychotherapy: Basic day-to-day support.    Education: Diet, exercise, abstinence from drugs and alcohol,   Lab Results:   Personally reviewed     Lab Results   Component Value Date    WBC 3.3 (L) 02/09/2016    HGB 14.0 02/09/2016    HCT 41.4 02/09/2016     02/09/2016    CHOL 139 02/09/2016    TRIG 111 02/09/2016    HDL 37 (L) 02/09/2016    LDLDIRECT 91.0 06/28/2011    ALT 42 02/09/2016    AST 27 02/09/2016     02/09/2016    K 4.2 02/09/2016     02/09/2016    CREATININE 0.84 02/09/2016    BUN 15 02/09/2016     "CO2 32 02/09/2016    TSH 0.99 02/09/2016    PSA 0.77 06/24/2010    HGBA1C 5.9 02/09/2016    MICROALBUR <0.50 02/09/2016       Vital signs:  /64  Pulse (!) 50  Ht 5' 10\" (1.778 m)  Wt 199 lb (90.3 kg)  BMI 28.55 kg/m2    Allergies: Adhesive and Latex         Medications:       Current Outpatient Prescriptions on File Prior to Visit   Medication Sig     acetaminophen (TYLENOL) 500 MG tablet Take 1,000 mg by mouth every 6 (six) hours as needed for pain (Do not exceed 4000mg in 24 hour period).      aspirin 81 MG tablet Take 81 mg by mouth daily. Take with food at 8AM     benztropine (COGENTIN) 0.5 MG tablet TAKE 1 TABLET BY MOUTH TWICE DAILY     bisacodyl (DULCOLAX) 5 mg EC tablet Take 10 mg by mouth daily as needed for constipation.     blood glucose test (CONTOUR NEXT STRIPS) strips Test twice daily.  Pharmacy allowed to substitute per patient's insurance. Dx code: E11.9     blood-glucose meter (CONTOUR NEXT EZ METER) Misc Use as directed. Pharmacy allowed to substitute per patient's insurance. Dx code: E11.9     calcium-vitamin D 500 mg(1,250mg) -200 unit per tablet Take 1 tablet by mouth 2 (two) times a day with meals.     donepezil (ARICEPT) 10 MG tablet TAKE 1 TABLET BY MOUTH EVERY MORNING     ibuprofen (ADVIL,MOTRIN) 200 MG tablet Take 200 mg by mouth every 6 (six) hours as needed for pain.     lancets (SURE COMFORT LANCETS) 30 gauge Misc Test twice daily.  Pharmacy allowed to substitute per patient's insurance. Dx code: E11.9     levETIRAcetam (KEPPRA XR) 500 mg 24 hr tablet Take 1 tablet (500 mg total) by mouth every morning. (Patient taking differently: Take 1,000 mg by mouth every morning. )     losartan (COZAAR) 50 MG tablet Take 50 mg by mouth daily with breakfast.     metFORMIN (GLUCOPHAGE-XR) 500 MG 24 hr tablet Take 1,000 mg by mouth daily with breakfast.     multivitamin (MULTIVITAMIN) per tablet Take 1 tablet by mouth daily.     omeprazole (PRILOSEC) 20 MG capsule Take 20 mg by mouth daily. "     ondansetron (ZOFRAN) 4 MG tablet Take 4 mg by mouth every 4 (four) hours as needed for nausea.     oxybutynin (DITROPAN XL) 5 MG ER tablet Take 2.5 mg by mouth 2 (two) times a day.     REGULOID, SUGAR FREE Powd Take 6 g by mouth daily.      risperiDONE (RISPERDAL) 0.5 MG tablet TAKE 1 TABLET BY MOUTH TWICE DAILY     sertraline (ZOLOFT) 100 MG tablet Take 1 tablet (100 mg total) by mouth daily.     topiramate (TOPAMAX) 25 MG tablet Take 25 mg by mouth.     traZODone (DESYREL) 50 MG tablet Take 1-2 tablets ( mg total) by mouth at bedtime as needed for sleep.     triamcinolone (KENALOG) 0.1 % cream Apply topically 2 (two) times a day.     SENNOSIDES/DOCUSATE SODIUM (SENEXON-S ORAL) Take 1 tablet by mouth 2 (two) times a day.     No current facility-administered medications on file prior to visit.           Review of Systems:    History of present illness, chronic headaches, otherwise reminder of review of systems is negative for; is general, eyes, ears, nose, throat, neck, respiratory, cardiovascular, gastrointestinal, genitourinary, meniscal skeletal, neurological, hematological, endocrine and dermatological system    Coordination of Care:   More than 25 minutes spent on this visit with more than 50% of time spent on coordination of care including: Reviewing medical records, coordinating care with group home staff , providing supportive therapy regarding above issues, discussing medications with group home staff and patient, entering orders and preparing documentation for the visit.    This note was created with the help of Dragon dictation system.  All grammatical/typing errors or context distortion  are unintentional.    Samia Hebert MD

## 2021-06-20 NOTE — PROGRESS NOTES
Patient here today for follow up of medication management.     Pt states nothing has changed from last time.     Pt denies both SI/HI  Depression: 3/5  Anxiety: 3/5     [facility]    [facility] [place] Date: 10/03/18  Query Report Page#: 1  Patient Rx History Report  LIZZIE SHIPLEY  Search Criteria: Last Name 'lizzie' and First Name 'dinah' and  =  and Request Period = '10/03/17' to  '10/03/18' - 3 out of 3 Recipients Selected.  Fill Date Product, Str, Form Qty Days Pt ID Prescriber Written RX# N/R* Pharm **MED+  ---------- -------------------------------- ------------ ---- --------- ---------- ---------- ------------ ----- --------- ------  2018 CLONAZEPAM 1 MG TABLET [id number] 30 [id number] 2018 [id number] N [id number] [facility]  2017 CLONAZEPAM 1 MG TABLET [id number] 30 [id number] 2017 [id number] R [id number] [facility]  2017 CLONAZEPAM 1 MG TABLET [id number] 30 [id number] 2017 [id number] N [id number] [facility]  2017 CLONAZEPAM 1 MG TABLET [id number] 7 [id number] 2017 [id number] N [id number] [facility]  10/27/2017 CLONAZEPAM 1 MG TABLET [id number] 30 [id number] 05/15/2017 [id number] R [id number] [facility]  *N/R N=New R=Refill  +MED Daily  Prescribers for prescriptions listed  ----------------------------------------------------------------------------------------------------------------------------------  [id number] CHRISTINE SORIANO MD; [address] [facility] [address]  Pharmacies that dispensed prescriptions listed  ----------------------------------------------------------------------------------------------------------------------------------  [id number] [facility]; [address] [facility] [address],  Patients that match search criteria  ----------------------------------------------------------------------------------------------------------------------------------  [id number] ISMAEL TELLEZ 59; [address]  [place] [address]  [id number] DONAVON SHIPLEYISMAEL 59; [address] [facility] [address]  [id number] DONAVON SHIPLEY  59; [address] [facility] [address]  MED Summary  This section displays cumulative MED values by unique recipient. The MED Max value is the maximum occurrence of cumulative MED  sustained for any 3 consecutive days. This value is calculated based on prescriptions dispensed during the date range requested.  -----------------------------------------------------------------------------------------------------------------------------------  0 QUINTEN RAPHAEL; 1955[address] [facility] [address]  **Per CDC guidance, the conversion factors and associated daily morphine milligram equivalents for drugs prescribed as part of  medication-assisted treatment for opioid use disorder should not be used to benchmark against dosage thresholds meant for opioids  prescribed for pain.  Report Disclaimers:  The report provided above is based upon the search criteria and the data provided by the dispensing entities. For more information  about any prescription, please contact the dispenser or the prescriber.  This report contains confidential information, including patient identifiers, and is not a public record. The information on this  report must be treated as protected health information and is to be disclosed to others only as authorized by applicable state  and Federal regulations.          Correct pharmacy verified with patient and confirmed in snapshot? [x] yes []no    Charge captured ? [x] yes  [] no    Medications Phoned  to Pharmacy [] yes []no  Name of Pharmacist:  List Medications, including dose, quantity and instructions      Medication Prescriptions given to patient   [] yes  [x] no   List the name of the drug the prescription was written for.       Medications ordered this visit were e-scribed.  Verified by order class [x] yes  [] no    Medication changes or discontinuations  were communicated to patient's pharmacy: [] yes  [x] no    UA collected [] yes  [x] no    [place] Prescription Monitoring Program Reviewed? [x] yes  [] no    Referrals were made to:      Future appointment was made: [x] yes  [] no    Dictation completed at time of chart check: [] yes  [x] no    I have checked the documentation for today s encounters and the above information has been reviewed and completed.

## 2021-06-22 NOTE — PROGRESS NOTES
Pt here for psychiatric follow up   Discus performed today Score: 2 ; due 6/2019    Denies SI/HI thoughts at this time.     [place] :  Unable to obtain for visit today due to recent process change with  program.       Correct pharmacy verified with patient and confirmed in snapshot? [x] yes []no    Charge captured ? [x] yes  [] no    Medications Phoned  to Pharmacy [] yes [x]no  Name of Pharmacist:  List Medications, including dose, quantity and instructions      Medication Prescriptions given to patient   [] yes  [x] no   List the name of the drug the prescription was written for.       Medications ordered this visit were e-scribed.  Verified by order class [] yes  [x] no    Medication changes or discontinuations were communicated to patient's pharmacy: [] yes  [x] no    UA collected [] yes  [x] no    [place] Prescription Monitoring Program Reviewed? [] yes  [x] no    Referrals were made to:  None    Future appointment was made: [x] yes  [] no  03/20/2019  Dictation completed at time of chart check: [] yes  [x] no    I have checked the documentation for today s encounters and the above information has been reviewed and completed.

## 2021-06-22 NOTE — PROGRESS NOTES
Psychiatric  Progress Note  Date of visit: 12/26/2018         Discussion of Care and Treatment Recommendations:     Tavon is a 63 y.o. 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 He is a resident of the [facility]. He comes for this appointment accompanied by group home staff.    Group home staff member, the patient and I discussed treatment plan and these are  recommendations.     1.Patient will take the medications as prescribed.    Zoloft 100 mg every morning for depression.   Aricept  10 mg every am  Risperdal 0.5 mg twice a day  Cogentin 0.5mg 2 times a day  Trazodone 50 -100 mg  at night as needed   Patient will not stop taking medications or adjust them without consulting with the provider.    2.Staff will call with any problems between 2 visits.   3.[facility] staff will take the patient to the emergency room if not feeling safe  or unable to function in the community, or if suicidal, homicidal or hearing voices or having paranoia.   4. [facility] staff will watch his diet and exercise.   5.Patient will see non psychiatric providers for non psychiatric disorders.   6. Next appointment in 3  month  7.  staff will continue to encourage him to use a walker instead a wheelchair  8. Patient has conservator for financial decision making. Patient needs guardian for medical decision making.His sister should start guardianship process.          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   Dementia due to traumatic brain injury.   Cocaine abuse in full remission.   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, With Hallucinations     Chronic Obstructive Pulmonary Disease     Essential Hypertension     Headache     Type 2 Diabetes Mellitus - Uncomplicated, Controlled     Alcohol Dependence In Remission     Encephalopathy     Anoxic Brain Damage     Mood disorder due to old head trauma     Epilepsy and Recurrent Seizures     Chronic hepatitis C (H)     Opiate dependence, continuous (H)     Neuroleptic Consent signed on 10/13/15     Follicular lymphoma (H)             Chief Complaint / Subjective:      Chief complaint: holiday stress, continues to be more active, missing his children and it makes him depressed.     History of Present Illness: Patient is here with  staff. She says he continues to be more active. He goes to Day Program 3 times a week. He says he does not care to do much more. Staff says there are arts and crafts lessons. She says his family comes to visit him .He hopes they will come for the New Year. He says he sleeps ok. Appetite is ok. Energy is ok. He says mood is better. He communicates with other clients and staff. There are 4 other people in his .2 males and 2 females and he gets along OK with them. He likes to watch TV and listen to the music. He says he has chronic headache and he continues to ask for opiates. I explained to him why he can not have it. He is ok with it and he takes regular Tylenol.He says he misses his children. He has not seen them for many years.  It makes him depressed that he can not see them.Not suicidal or homicidal. No hallucinations or delusions.      Past psychiatric history:  Hospitalizations: 2 in 2006  Suicide attempt: Accidental overdose on heroin in 2006 and he had anoxic brain injury and chronic headaches.   ECT no  Guns:no  Chemical dependency history: Patient had 6 chemical dependency treatments more than 10 years ago    Family  history:   Psychiatric: Questionable  Suicide attempt: Questionable  Chemical dependency: Positive  Diabetes: Positive    Social history: Patient has a GED.    No abuse.    His parents  when he was 20 years old.    He was  and .    He has 2 children.  No contact.    He is on SSDI.  He lives in a group home.    He has conservator for financial decisions    Mental Status Examination:   General: fair hygiene, cooperative  Orientation: To self and partially in place, not in time  Speech: Impaired articulation due to post traumatic brain injury   Language: normal naming  Thought process: [place]   Thought content:  hears some voices , no command   Suicidal thoughts: Absent   Homicidal thoughts: Absent   Associations: Connected within what he says   Affect: constricted  Mood: mostly ok, mild depressed at times  Intellectual functioning: decreased due to traumatic brain injury   Memory: Impaired due to traumatic brain injury   Fund of knowledge: decreased due to traumatic brain injury   Attention and concentration: at his baseline   Gait: wheelchair-bound   Psychomotor activity: Calm, no agitation   Muscles: No atrophy, no abnormal movements   InSight and judgment: Limited    Medication adherence: compliant  Medication side effects: absent  Information about medications: Discussed with group home staff inpatient    Psychotherapy: Basic day-to-day support.    Education: Diet, exercise, abstinence from drugs and alcohol,   Lab Results:   Personally reviewed     Lab Results   Component Value Date    WBC 3.3 (L) 02/09/2016    HGB 14.0 02/09/2016    HCT 41.4 02/09/2016     02/09/2016    CHOL 139 02/09/2016    TRIG 111 02/09/2016    HDL 37 (L) 02/09/2016    LDLDIRECT 91.0 06/28/2011    ALT 42 02/09/2016    AST 27 02/09/2016     02/09/2016    K 4.2 02/09/2016     02/09/2016    CREATININE 0.84 02/09/2016    BUN 15 02/09/2016    CO2 32 02/09/2016    TSH 0.99 02/09/2016    PSA 0.77  "06/24/2010    HGBA1C 5.9 02/09/2016    MICROALBUR <0.50 02/09/2016       Vital signs:  /67 (Patient Site: Right Arm, Patient Position: Sitting, Cuff Size: Adult Large)   Pulse (!) 55   Ht 5' 10\" (1.778 m)   Wt 199 lb (90.3 kg)   BMI 28.55 kg/m      Allergies: Adhesive and Latex         Medications:       Current Outpatient Medications on File Prior to Visit   Medication Sig     acetaminophen (TYLENOL) 500 MG tablet Take 1,000 mg by mouth every 6 (six) hours as needed for pain (Do not exceed 4000mg in 24 hour period).      aspirin 81 MG tablet Take 81 mg by mouth daily. Take with food at 8AM     benztropine (COGENTIN) 0.5 MG tablet TAKE 1 TABLET BY MOUTH TWICE DAILY     bisacodyl (DULCOLAX) 5 mg EC tablet Take 10 mg by mouth daily as needed for constipation.     blood glucose test (CONTOUR NEXT STRIPS) strips Test twice daily.  Pharmacy allowed to substitute per patient's insurance. Dx code: E11.9     blood-glucose meter (CONTOUR NEXT EZ METER) Misc Use as directed. Pharmacy allowed to substitute per patient's insurance. Dx code: E11.9     calcium-vitamin D 500 mg(1,250mg) -200 unit per tablet Take 1 tablet by mouth 2 (two) times a day with meals.     donepezil (ARICEPT) 10 MG tablet TAKE 1 TABLET BY MOUTH EVERY MORNING     ibuprofen (ADVIL,MOTRIN) 200 MG tablet Take 200 mg by mouth every 6 (six) hours as needed for pain.     lancets (SURE COMFORT LANCETS) 30 gauge Misc Test twice daily.  Pharmacy allowed to substitute per patient's insurance. Dx code: E11.9     levETIRAcetam (KEPPRA XR) 500 mg 24 hr tablet Take 1 tablet (500 mg total) by mouth every morning. (Patient taking differently: Take 1,000 mg by mouth every morning. )     losartan (COZAAR) 50 MG tablet Take 50 mg by mouth daily with breakfast.     metFORMIN (GLUCOPHAGE-XR) 500 MG 24 hr tablet Take 1,000 mg by mouth daily with breakfast.     multivitamin (MULTIVITAMIN) per tablet Take 1 tablet by mouth daily.     omeprazole (PRILOSEC) 20 MG capsule " Take 20 mg by mouth daily.     ondansetron (ZOFRAN) 4 MG tablet Take 4 mg by mouth every 4 (four) hours as needed for nausea.     oxybutynin (DITROPAN XL) 5 MG ER tablet Take 2.5 mg by mouth 2 (two) times a day.     REGULOID, SUGAR FREE Powd Take 6 g by mouth daily.      risperiDONE (RISPERDAL) 0.5 MG tablet TAKE 1 TABLET BY MOUTH TWICE DAILY     SENNOSIDES/DOCUSATE SODIUM (SENEXON-S ORAL) Take 1 tablet by mouth 2 (two) times a day.     sertraline (ZOLOFT) 100 MG tablet Take 1 tablet (100 mg total) by mouth daily.     topiramate (TOPAMAX) 25 MG tablet Take 25 mg by mouth.     traZODone (DESYREL) 50 MG tablet Take 1-2 tablets ( mg total) by mouth at bedtime as needed for sleep.     triamcinolone (KENALOG) 0.1 % cream Apply topically 2 (two) times a day.     No current facility-administered medications on file prior to visit.           Review of Systems:    History of present illness, chronic headaches, otherwise reminder of review of systems is negative for; is general, eyes, ears, nose, throat, neck, respiratory, cardiovascular, gastrointestinal, genitourinary, meniscal skeletal, neurological, hematological, endocrine and dermatological system    Coordination of Care:   More than 25 minutes spent on this visit with more than 50% of time spent on coordination of care including: Reviewing medical records, discussing  care with group home staff , providing supportive therapy regarding above issues, discussing medications with group home staff and patient.     This note was created with the help of Dragon dictation system.  All grammatical/typing errors or context distortion  are unintentional.    Samia Hebert MD

## 2021-06-25 NOTE — PROGRESS NOTES
Correct pharmacy verified with patient and confirmed in snapshot? [x] yes []no    Charge captured ? [x] yes  [] no    Medications Phoned  to Pharmacy [] yes [x]no  Name of Pharmacist:  List Medications, including dose, quantity and instructions      Medication Prescriptions given to patient   [] yes  [x] no   List the name of the drug the prescription was written for.       Medications ordered this visit were e-scribed.  Verified by order class [x] yes  [] no  Cogentin 0.5 mg  Aricept 10 mg  Risperdal 0.5 mg  Zoloft 100 mg  Trazodone 50 mg    Medication changes or discontinuations were communicated to patient's pharmacy: [] yes  [x] no    UA collected [] yes  [x] no    [place] Prescription Monitoring Program Reviewed? [] yes  [x] no    Referrals were made to: none     Future appointment was made: [x] yes  [] no    Dictation completed at time of chart check: [x] yes  [] no    I have checked the documentation for today s encounters and the above information has been reviewed and completed.

## 2021-06-25 NOTE — PROGRESS NOTES
Psychiatric  Progress Note  Date of visit: 3/20/2019         Discussion of Care and Treatment Recommendations:     Tavon is a 64 y.o. 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 He is a resident of the [facility]. He comes for this appointment accompanied by group home staff.    Group home staff member, the patient and I discussed treatment plan and these are  recommendations.     1.Patient will take the medications as prescribed.    Increase Zoloft 150 mg every morning for depression.   Aricept  10 mg every am  Risperdal 0.5 mg twice a day  Cogentin 0.5mg 2 times a day  Trazodone 50 -100 mg  at night as needed   Patient will not stop taking medications or adjust them without consulting with the provider.    2.Staff will call with any problems between 2 visits.   3.[facility] staff will take the patient to the emergency room if not feeling safe  or unable to function in the community, or if suicidal, homicidal or hearing voices or having paranoia.   4. [facility] staff will watch his diet and exercise.   5.Patient will see non psychiatric providers for non psychiatric disorders.   6. Next appointment in 1 month  7.  staff will continue to encourage him to use a walker instead a wheelchair  8. Patient has conservator for financial decision making. Patient needs guardian for medical decision making.His sister should start guardianship process.          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   Cocaine abuse in full remission.   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, With Hallucinations     Chronic Obstructive Pulmonary Disease     Essential Hypertension     Headache     Type 2 Diabetes Mellitus - Uncomplicated, Controlled     Alcohol Dependence In Remission     Encephalopathy     Anoxic Brain Damage     Mood disorder due to old head trauma     Epilepsy and Recurrent Seizures     Chronic hepatitis C (H)     Opiate dependence, continuous (H)     Neuroleptic Consent signed on 10/13/15     Follicular lymphoma (H)             Chief Complaint / Subjective:      Chief complaint: pt says he feels depressed, empty, discouraged     History of Present Illness:   Patient is here with  staff. He says he feels depressed, empty, discouraged . He says his memory is worse and  staff says he has memory problem. He can not remember going to Day Treatment. He says he is not happy with his life. He says he does not have much life to speak off. He says he does not go anywhere and he does not do anything, he does not see people he lives such as his children, so he says it is not much of a life. He is not sure when he saw his sister last time. He says sleep is ok,  staff says he sleeps a lot .  Appetite ok per his report, but staff says he forgets he ate and then he goes back to the pantry and eats again.  Energy  Is decreased. He denies suicidal and homicidal ideas, delusions and hallucinations. He takes his medications. He tolerates them well. He says he has had chronic head and neck pain. He has neurologist. He had acupuncture. He wants opiates, but neurologist will not order it because there is no long term benefit.      From the past note:  Past psychiatric history:  Hospitalizations: 2 in 2006  Suicide attempt: Accidental overdose on heroin in 2006 and he had anoxic brain injury and chronic headaches.   ECT  no  Guns:no  Chemical dependency history: Patient had 6 chemical dependency treatments more than 10 years ago    Family history:   Psychiatric: Questionable  Suicide attempt: Questionable  Chemical dependency: Positive  Diabetes: Positive    Social history: Patient has a GED.    No abuse.    His parents  when he was 20 years old.    He was  and .    He has 2 children.  No contact.    He is on SSDI.  He lives in a group home.    He has conservator for financial decisions    Mental Status Examination today:   General: fair hygiene, cooperative  Orientation: Disoriented in time, oriented to self and partially to place  Speech: Impaired articulation due to TBI  Language: normal naming  Thought process: [place]   Thought content: Denies hallucinations and delusions oncrete  Suicidal thoughts: Denies  Homicidal thoughts: Denies  Associations: Contracted t he says   Affect: Sad  Mood: Depressed  Intellectual functioning: Decrease due to TBI  Memory: Decreased due to TBI   Fund of knowledge: Decreased due to TBI   Attention and concentration: Decreased   Gait: Uses wheelchair a lot, but staff says he can walk with a walker  Psychomotor activity: No agitation  Muscles: No atrophy, no abnormal movements atrophy, no abnormal movements   InSight and judgment: Limited    Medication changes:yes  Medication adherence: compliant  Medication side effects: absent  Information about medications: Discussed with group home staff inpatient    Psychotherapy: Basic day-to-day support.    Education: Diet, exercise, abstinence from drugs and alcohol,   Lab Results:   Personally reviewed     Lab Results   Component Value Date    WBC 3.3 (L) 02/09/2016    HGB 14.0 02/09/2016    HCT 41.4 02/09/2016     02/09/2016    CHOL 139 02/09/2016    TRIG 111 02/09/2016    HDL 37 (L) 02/09/2016    LDLDIRECT 91.0 06/28/2011    ALT 42 02/09/2016    AST 27 02/09/2016     02/09/2016    K 4.2 02/09/2016     02/09/2016  "   CREATININE 0.84 02/09/2016    BUN 15 02/09/2016    CO2 32 02/09/2016    TSH 0.99 02/09/2016    PSA 0.77 06/24/2010    HGBA1C 5.9 02/09/2016    MICROALBUR <0.50 02/09/2016       Vital signs:  /66   Pulse (!) 55   Ht 5' 10\" (1.778 m)   Wt 185 lb (83.9 kg)   BMI 26.54 kg/m      Allergies: Adhesive and Latex         Medications:       Current Outpatient Medications on File Prior to Visit   Medication Sig     acetaminophen (TYLENOL) 500 MG tablet Take 1,000 mg by mouth every 6 (six) hours as needed for pain (Do not exceed 4000mg in 24 hour period).      aspirin 81 MG tablet Take 81 mg by mouth daily. Take with food at 8AM     bisacodyl (DULCOLAX) 5 mg EC tablet Take 10 mg by mouth daily as needed for constipation.     donepezil (ARICEPT) 10 MG tablet TAKE 1 TABLET BY MOUTH EVERY MORNING     ibuprofen (ADVIL,MOTRIN) 200 MG tablet Take 200 mg by mouth every 6 (six) hours as needed for pain.     levETIRAcetam (KEPPRA XR) 500 mg 24 hr tablet Take 1 tablet (500 mg total) by mouth every morning. (Patient taking differently: Take 1,000 mg by mouth every morning. )     losartan (COZAAR) 50 MG tablet Take 25 mg by mouth daily with breakfast.            metFORMIN (GLUCOPHAGE-XR) 500 MG 24 hr tablet Take 1,000 mg by mouth daily with breakfast.     multivitamin (MULTIVITAMIN) per tablet Take 1 tablet by mouth daily.     omeprazole (PRILOSEC) 20 MG capsule Take 20 mg by mouth daily.     ondansetron (ZOFRAN) 4 MG tablet Take 4 mg by mouth every 4 (four) hours as needed for nausea.     oxybutynin (DITROPAN XL) 5 MG ER tablet Take 2.5 mg by mouth 2 (two) times a day.     risperiDONE (RISPERDAL) 0.5 MG tablet TAKE 1 TABLET BY MOUTH TWICE DAILY     sertraline (ZOLOFT) 100 MG tablet Take 1 tablet (100 mg total) by mouth daily.     topiramate (TOPAMAX) 25 MG tablet Take 25 mg by mouth.     traZODone (DESYREL) 50 MG tablet Take 1-2 tablets ( mg total) by mouth at bedtime as needed for sleep.     triamcinolone (KENALOG) 0.1 " % cream Apply topically 2 (two) times a day.     benztropine (COGENTIN) 0.5 MG tablet TAKE 1 TABLET BY MOUTH TWICE DAILY     blood glucose test (CONTOUR NEXT STRIPS) strips Test twice daily.  Pharmacy allowed to substitute per patient's insurance. Dx code: E11.9     blood-glucose meter (CONTOUR NEXT EZ METER) Misc Use as directed. Pharmacy allowed to substitute per patient's insurance. Dx code: E11.9     calcium-vitamin D 500 mg(1,250mg) -200 unit per tablet Take 1 tablet by mouth 2 (two) times a day with meals.     lancets (SURE COMFORT LANCETS) 30 gauge Misc Test twice daily.  Pharmacy allowed to substitute per patient's insurance. Dx code: E11.9     REGULOID, SUGAR FREE Powd Take 6 g by mouth daily.      SENNOSIDES/DOCUSATE SODIUM (SENEXON-S ORAL) Take 1 tablet by mouth 2 (two) times a day.     No current facility-administered medications on file prior to visit.           Review of Systems:    chronic headaches and neck pain, otherwise as per HPI, reminder of review of systems is negative for; is general, eyes, ears, nose, throat, neck, respiratory, cardiovascular, gastrointestinal, genitourinary, meniscal skeletal, neurological, hematological, endocrine and dermatological system    Coordination of Care:   More than 25 minutes spent on this visit with more than 50% of time spent on: Coordination of care with nurse, discussing  care with group home staff , providing supportive therapy regarding above issues, discussing medications with group home staff and patient.     This note was created with the help of Dragon dictation system.  All grammatical/typing errors or context distortion  are unintentional.    Samia Hebert MD

## 2021-06-25 NOTE — PATIENT INSTRUCTIONS - HE
1.Patient will take the medications as prescribed.    Increase Zoloft 150 mg every morning for depression.   Aricept  10 mg every am  Risperdal 0.5 mg twice a day  Cogentin 0.5mg 2 times a day  Trazodone 50 -100 mg  at night as needed   Patient will not stop taking medications or adjust them without consulting with the provider.    2.Staff will call with any problems between 2 visits.   3.nursing home staff will take the patient to the emergency room if not feeling safe  or unable to function in the community, or if suicidal, homicidal or hearing voices or having paranoia.   4. nursing home staff will watch his diet and exercise.   5.Patient will see non psychiatric providers for non psychiatric disorders.   6. Next appointment in 1 month  7.  staff will continue to encourage him to use a walker instead a wheelchair  8. Patient has conservator for financial decision making. Patient needs guardian for medical decision making.His sister should start guardianship process.

## 2021-06-29 NOTE — PROGRESS NOTES
Progress Notes by Ilsa Kevin LPN at 4/14/2020 12:45 PM     Author: Ilsa Kevin LPN Service: -- Author Type: Licensed Nurse    Filed: 4/15/2020  4:00 PM Encounter Date: 4/14/2020 Status: Addendum    : Ilsa Kevin LPN (Licensed Nurse)    Related Notes: Original Note by Ilsa Kevin LPN (Licensed Nurse) filed at 4/14/2020  4:00 PM       Patient verified allergies, medications and pharmacy via phone. Patient states he is ready for visit. Pt is complaining of ongoing neck and back pain, asking to prescribe him something for pain.     Medications Phoned  to Pharmacy [] yes [x]no  Name of Pharmacist:  List Medications, including dose, quantity and instructions    Medications ordered this visit were e-scribed.  Verified by order class [x] yes  [] no  Cogentin  Medication changes or discontinuations were communicated to patient's pharmacy: [x] yes  [] no  Called [facility] and D/c'd Cogentin 1 mg due to dose decrease  Dictation completed at time of chart check: [x] yes  [x] no    I have checked the documentation for todays encounters and the above information has been reviewed and completed.

## 2021-06-30 NOTE — PROGRESS NOTES
Progress Notes by Kimberly Rae CMA at 2/3/2021  9:45 AM     Author: Kimberly Rae CMA Service: -- Author Type: Certified Medical Assistant    Filed: 2/3/2021 12:48 PM Encounter Date: 2/3/2021 Status: Signed    : Kimberly Rae CMA (Certified Medical Assistant)       This video/telephone visit will be conducted via a call between you and your physician/provider. We have found that certain health care needs can be provided without the need for an in-person physical exam. This service lets us provide the care you need with a video /telephone conversation. If a prescription is necessary we can send it directly to your pharmacy. If lab work is needed we can place an order for that and you can then stop by our lab to have the test done at a later time.   Just as we bill insurance for in-person visits, we also bill insurance for video/telephone visits. If you have questions about your insurance coverage, we recommend that you speak with your insurance company.   Patient has given verbal consent for video/Telephone visit? Yes  Patient would like telephone visit, please call:  [phone]  KALYAN/SHNAICE HUDDLESTON CMA    Patient verified allergies, medications and pharmacy via phone.  Patient states he is ready for visit.    [place]  reviewed prior to appt, please see embedded report below:

## 2021-07-19 DIAGNOSIS — S09.90XS MOOD DISORDER DUE TO OLD HEAD INJURY: Primary | ICD-10-CM

## 2021-07-19 DIAGNOSIS — F06.30 MOOD DISORDER DUE TO OLD HEAD INJURY: Primary | ICD-10-CM

## 2021-07-19 NOTE — TELEPHONE ENCOUNTER
Date of Last Office Visit: 2/3/2021  Date of Next Office Visit: 8/3/2021  No shows since last visit: none  Cancellations since last visit: none    Medication requested:  Medication  sertraline (ZOLOFT) 100 MG tablet [[id number]]  Outpatient Medication Detail     Disp Refills Start End JOCELIN   sertraline (ZOLOFT) 100 MG tablet 45 tablet 2 4/28/2021  No   Sig: TAKE ONE AND ONE-HALF TABLETS (150MG) BY MOUTH EVERY MORNING.   Sent to pharmacy as: sertraline 100 mg tablet (ZOLOFT)   Notes to Pharmacy: SD17   Cosign for Ordering: Accepted by Samia Hebert MD on 5/5/2021 11:15 PM   E-Prescribing Status: Receipt confirmed by pharmacy (4/28/2021  2:57 PM CDT)   sertraline (ZOLOFT) 100 MG tablet [[id number]]    Electronically signed by: Geovanna Jimenez RN on 04/28/21 1795 Status: Active   Ordering user: Geovanna Jimenez RN 04/28/21 9226 Ordering provider: Samia Hebert MD   Authorized by: Samia Hebert MD   Cosigning events  Electronically cosigned by Samia Hebert MD 05/05/21 5501 for Ordering   Frequency:  04/28/21 - Until Discontinued Released by: Geovanna Jimenez RN 04/28/21 3696   Diagnoses  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  [x]Medication unable to be refilled by RN due to criteria not met as indicated below:    []Eligibility - not seen in the last year   []Supervision - no future appointment   []Compliance - no shows, cancellations or lapse in therapy   []Verification - order discrepancy   []Controlled medication   [x]Medication not included in policy   []90-day supply request   []Other

## 2021-07-27 RX ORDER — SERTRALINE HYDROCHLORIDE 100 MG/1
TABLET, FILM COATED ORAL
Qty: 45 TABLET | Refills: 11 | Status: SHIPPED | OUTPATIENT
Start: 2021-07-27 | End: 2022-08-25

## 2021-08-03 ENCOUNTER — VIRTUAL VISIT (OUTPATIENT)
Dept: BEHAVIORAL HEALTH | Facility: CLINIC | Age: 66
End: 2021-08-03
Payer: COMMERCIAL

## 2021-08-03 DIAGNOSIS — S09.90XS MOOD DISORDER DUE TO OLD HEAD INJURY: Primary | ICD-10-CM

## 2021-08-03 DIAGNOSIS — F14.11 COCAINE ABUSE IN REMISSION (H): ICD-10-CM

## 2021-08-03 DIAGNOSIS — F06.30 MOOD DISORDER DUE TO OLD HEAD INJURY: Primary | ICD-10-CM

## 2021-08-03 DIAGNOSIS — F10.21 ALCOHOL DEPENDENCE IN REMISSION (H): ICD-10-CM

## 2021-08-03 DIAGNOSIS — F02.80 DEMENTIA DUE TO HEAD TRAUMA WITHOUT BEHAVIORAL DISTURBANCE (H): ICD-10-CM

## 2021-08-03 DIAGNOSIS — S09.90XS DEMENTIA DUE TO HEAD TRAUMA WITHOUT BEHAVIORAL DISTURBANCE (H): ICD-10-CM

## 2021-08-03 DIAGNOSIS — R29.818 EXTRAPYRAMIDAL SYMPTOM: ICD-10-CM

## 2021-08-03 PROCEDURE — 99207 PR CDG-MDM COMPONENT: MEETS MODERATE - UP CODED: CPT | Performed by: PSYCHIATRY & NEUROLOGY

## 2021-08-03 PROCEDURE — 99214 OFFICE O/P EST MOD 30 MIN: CPT | Mod: 95 | Performed by: PSYCHIATRY & NEUROLOGY

## 2021-08-03 RX ORDER — MULTIPLE VITAMINS W/ MINERALS TAB 9MG-400MCG
1 TAB ORAL DAILY
COMMUNITY

## 2021-08-03 RX ORDER — RISPERIDONE 0.5 MG/1
0.5 TABLET ORAL 2 TIMES DAILY
COMMUNITY
Start: 2020-10-06 | End: 2021-10-29

## 2021-08-03 RX ORDER — TRAZODONE HYDROCHLORIDE 50 MG/1
50-100 TABLET, FILM COATED ORAL
COMMUNITY
End: 2022-03-02

## 2021-08-03 RX ORDER — SERTRALINE HYDROCHLORIDE 100 MG/1
150 TABLET, FILM COATED ORAL DAILY
COMMUNITY
Start: 2020-03-09 | End: 2022-03-02

## 2021-08-03 RX ORDER — PSYLLIUM HUSK 3 G/5.8 G
6 POWDER (GRAM) ORAL DAILY
COMMUNITY
Start: 2021-06-15

## 2021-08-03 RX ORDER — GABAPENTIN 100 MG/1
100 CAPSULE ORAL 3 TIMES DAILY
COMMUNITY
Start: 2021-02-19 | End: 2022-10-03

## 2021-08-03 RX ORDER — DONEPEZIL HYDROCHLORIDE 10 MG/1
10 TABLET, FILM COATED ORAL DAILY
COMMUNITY
End: 2021-10-29

## 2021-08-03 NOTE — PROGRESS NOTES
"Telephone Outpatient Psychiatric Progress Note    Date of visit: 8/3/2021    Tavon Arias is a 66 y.o. male who is being evaluated via a billable telephone visit.      The patient has been notified of following:     \"This telephone visit will be conducted via a call between you and your physician/provider. We have found that certain health care needs can be provided without the need for a physical exam.  This service lets us provide the care you need with a short phone conversation.  If a prescription is necessary we can send it directly to your pharmacy.  If lab work is needed we can place an order for that and you can then stop by our lab to have the test done at a later time.    Telephone visits are billed at different rates depending on your insurance coverage. During this emergency period, for some insurers they may be billed the same as an in-person visit.  Please reach out to your insurance provider with any questions.    If during the course of the call the physician/provider feels a telephone visit is not appropriate, you will not be charged for this service.\"    Patient has given verbal consent to a Telephone visit? Yes     Patient would like to receive their AVS by fax  or      Patient's contact phone number:[phone]         Discussion of Care and Treatment Recommendations:       This visit is conducted by phone due to social distancing due to coronavirus.  Tavon is present by the phone and the [facility] staff is present by the phone with Magnus permission to help with getting information.    Tavon is a 66 y.o. 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 He is a resident of the [facility]. He comes for this appointment accompanied by group [place] staff.    Group home staff member, the patient and I discussed treatment plan and these " are  recommendations.     1.Patient will take the medications as prescribed.  Zoloft 150 mg qam for depression  Risperdal 0.5 mg two times a day for mood   Cogentin 0.5 mg daily for involuntary movement   Aricept  10 mg every am for memory loss  Trazodone 50 -100 mg  at night as needed for sleep   Patient will not stop taking medications or adjust them without consulting with the provider.  2.Staff will call with any problems between 2 visits.   3.[facility] staff will take the patient to the emergency room if not feeling safe  or unable to function in the community, or if suicidal, homicidal or hearing voices or having paranoia.   4. [facility] staff will watch his diet and exercise.   5.Patient will see non psychiatric providers for non psychiatric disorders.   6. Next appointment in 6 months  7.  staff will continue to encourage him to use a walker instead a wheelchair  8. 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  Extrapyramidal symptoms  Cocaine abuse in full remission.   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, With Hallucinations     Chronic Obstructive Pulmonary Disease     Essential Hypertension     Headache     Type 2 Diabetes Mellitus - Uncomplicated, Controlled     Alcohol Dependence In Remission     Encephalopathy     Anoxic Brain Damage     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  Tavon is present by the phone and [facility] Clau   is present to help with obtaining information and clarifying some responses.    Tavon says he is depressed. He says it is not bad and he is ok with his medications. He denies side effects. He says that he sleeps thorough the night. He says he eats ok, appetite is normal. No weight gain per his observation. He says energy fluctuates. He says he is more tired than he would like to be. He does not go top Day. He says he watches TV. He says he gets along ok with others. He does not know day, he says may be August, not sure about year. He knows in the group home and he says in [facility] . He says he forgets things. He denies suicidal and homicidal ideas, delusions and hallucinations. He says his lower back hurts and he has headaches all the time. This is chronic for him.     Clau , the  staff says that Tavon often complains of headache and body aches. His mood is depressed, sometimes more and sometimes less. His memory is worse. He forgets that he had breakfast. He sometimes get agitated, but staff can redirect him. He usually gets verbally aggressive, not physically. He does not complain of side effects of medications. He gets along ok with others. Staff takes him to see his PMD per PMD order, and he has appointment soon.       From the past note: Reviewed and updated  Past psychiatric history:  Hospitalizations: 2 in 2006  Suicide attempt: Accidental overdose on heroin in 2006 and he had anoxic brain injury and chronic headaches.   ECT no  Guns:no  Chemical dependency history: Patient had 6 chemical dependency treatments more than 10  years ago    Family history:   Psychiatric: Questionable  Suicide attempt: Questionable  Chemical dependency: Positive  Diabetes: Positive    Social history: Patient has a GED.    No abuse.    His parents  when he was 20 years old.    He was  and .    He has 2 children.  No contact.    He is on SSDI.  He lives in a group home.    He has conservator for financial decisions    Mental Status Examination today:   General:  cooperative  Orientation: Oriented to self, completely disoriented in time and its chronic, partially oriented in place  Speech: Impaired articulation due to TBI  Language: normal naming  Thought process: [place]  Thought content: Denies hallucinations and delusions   Suicidal thoughts: Denies  Homicidal thoughts: Denies  Associations: connected   Affect: irritable due to headache   Mood: depressed   Intellectual functioning: Decreased due to TBI  Memory: Decreased due to TBI   Fund of knowledge: Decreased due to TBI   Attention and concentration: Seems to be at baseline  Gait: I cannot assess it because this is phone visit, but the staff says that he uses walker at home  Psychomotor activity: Mild agitation in his voice  Muscles: I cannot assess it because this is phone visit  InSight and judgment: Limited    Medication adherence: compliant  Medication side effects: absent  Information about medications: Discussed with group home staff     Psychotherapy: Basic day-to-day support coping with coronavirus check    Education: Diet, exercise, abstinence from drugs and alcohol,     Lab Results:   Personally reviewed     Lab Results   Component Value Date    WBC 3.3 (L) 02/09/2016    HGB 14.0 02/09/2016    HCT 41.4 02/09/2016     02/09/2016    CHOL 139 02/09/2016    TRIG 111 02/09/2016    HDL 37 (L) 02/09/2016    LDLDIRECT 91.0 06/28/2011    ALT 42 02/09/2016    AST 27 02/09/2016     02/09/2016    K 4.2 02/09/2016     02/09/2016    CREATININE 0.84 02/09/2016    BUN  15 02/09/2016    CO2 32 02/09/2016    TSH 0.99 02/09/2016    PSA 0.77 06/24/2010    HGBA1C 5.9 02/09/2016    MICROALBUR <0.50 02/09/2016       Vital signs:  There were no vitals taken for this visit., because this is a phone visit     Allergies: Adhesive and Latex         Medications:       Current Outpatient Medications on File Prior to Visit   Medication Sig     acetaminophen (TYLENOL) 500 MG tablet Take 500-1,000 mg by mouth every 12 (twelve) hours as needed for pain (Do not exceed 4000mg in 24 hour period).            aspirin 81 MG tablet Take 81 mg by mouth daily. Take with food at 8AM     benztropine (COGENTIN) 0.5 MG tablet TAKE 1 TABLET BY MOUTH ONCE DAILY     bisacodyl (DULCOLAX) 5 mg EC tablet Take 10 mg by mouth daily as needed for constipation.     blood glucose test (CONTOUR NEXT STRIPS) strips Test twice daily.  Pharmacy allowed to substitute per patient's insurance. Dx code: E11.9     blood-glucose meter (CONTOUR NEXT EZ METER) Misc Use as directed. Pharmacy allowed to substitute per patient's insurance. Dx code: E11.9     calcium-vitamin D 500 mg(1,250mg) -200 unit per tablet Take 1 tablet by mouth 2 (two) times a day with meals.     donepeziL (ARICEPT) 10 MG tablet TAKE 1 TABLET BY MOUTH EVERY MORNING.     gabapentin (NEURONTIN) 100 MG capsule Take 100 mg by mouth 3 (three) times a day. Prescribed by MILI Ivan     ibuprofen (ADVIL,MOTRIN) 200 MG tablet Take 200-400 mg by mouth every 8 (eight) hours as needed for pain.            lancets (SURE COMFORT LANCETS) 30 gauge Misc Test twice daily.  Pharmacy allowed to substitute per patient's insurance. Dx code: E11.9     levETIRAcetam (KEPPRA XR) 500 mg 24 hr tablet Take 1 tablet (500 mg total) by mouth every morning. (Patient taking differently: Take 500 mg by mouth 2 (two) times a day.       )     losartan (COZAAR) 50 MG tablet Take 25 mg by mouth daily with breakfast.            metFORMIN (GLUCOPHAGE-XR) 500 MG 24 hr tablet Take 1,000 mg by mouth 2  (two) times a day.            multivitamin (MULTIVITAMIN) per tablet Take 1 tablet by mouth daily.     omeprazole (PRILOSEC) 20 MG capsule Take 20 mg by mouth daily.     ondansetron (ZOFRAN) 4 MG tablet Take 4 mg by mouth every 4 (four) hours as needed for nausea.     oxybutynin (DITROPAN XL) 5 MG ER tablet Take 2.5 mg by mouth 2 (two) times a day.     REGULOID, SUGAR FREE Powd Take 6 g by mouth daily.      risperiDONE (RISPERDAL) 0.5 MG tablet TAKE 1 TABLET BY MOUTH TWICE DAILY.     sertraline (ZOLOFT) 100 MG tablet TAKE ONE AND ONE-HALF TABLETS (150MG) BY MOUTH EVERY MORNING  *1 TOTAL FILL*     traZODone (DESYREL) 50 MG tablet Take 1-2 tablets ( mg total) by mouth at bedtime as needed for sleep.     triamcinolone (KENALOG) 0.1 % cream Apply topically 2 (two) times a day.     No current facility-administered medications on file prior to visit.           Review of Systems:    Chronic headache and neck pain, otherwise remainder review of systems is negative for: General, eyes, ears, nose, throat, neck, respiratory, cardiovascular, gastrointestinal, genitourinary, musculoskeletal, neurological, hematological and endocrine system.    Total time 27 minutes.  Spent on this visit with 21 minutes spent with the patient and his group home staff on the phone discussing his symptoms, diagnosis, treatment, diet, exercise, benefit of day treatment, providing supportive therapy regarding above symptoms.   Additional 6 minutes spent on coordination of care with nurse, orders and documentation    This note was created with the help of Dragon dictation system.  All grammatical/typing errors or context distortion  are unintentional.    Samia Hebert MD

## 2021-08-03 NOTE — PATIENT INSTRUCTIONS
1.Patient will take the medications as prescribed.  Zoloft 150 mg qam for depression  Risperdal 0.5 mg two times a day for mood   Cogentin 0.5 mg daily for involuntary movement   Aricept  10 mg every am for memory loss  Trazodone 50 -100 mg  at night as needed for sleep   Patient will not stop taking medications or adjust them without consulting with the provider.  2.Staff will call with any problems between 2 visits.   3.[facility] staff will take the patient to the emergency room if not feeling safe  or unable to function in the community, or if suicidal, homicidal or hearing voices or having paranoia.   4. [facility] staff will watch his diet and exercise.   5.Patient will see non psychiatric providers for non psychiatric disorders.   6. Next appointment in 6 months  7.  staff will continue to encourage him to use a walker instead a wheelchair  8. I recommend that his sister becomes his gardian due to worsening cognitive abilities and TBI

## 2021-08-03 NOTE — NURSING NOTE
This video/telephone visit will be conducted via a call between you and your physician/provider. We have found that certain health care needs can be provided without the need for an in-person physical exam. This service lets us provide the care you need with a video /telephone conversation. If a prescription is necessary we can send it directly to your pharmacy. If lab work is needed we can place an order for that and you can then stop by our lab to have the test done at a later time.    Just as we bill insurance for in-person visits, we also bill insurance for video/telephone visits. If you have questions about your insurance coverage, we recommend that you speak with your insurance company.    Patient has given verbal consent for video/Telephone visit? Yes  Patient would like telephone visit, please call: [phone] Staff will answer phone  KALYAN/SHANICE : Cosmo MUNOZ LPN  Staff reports patient has memory problems    Patient verified allergies, medications and pharmacy via phone.  Patient states he  is ready for visit.      ________________________________________  Medications Phoned  to Pharmacy [] yes [x]no  Name of Pharmacist:  List Medications, including dose, quantity and instructions    Medications ordered this visit were e-scribed.  Verified by order class [] yes  [x] no    Medication changes or discontinuations were communicated to patient's pharmacy: [] yes  [x] no    Dictation completed at time of chart check: [] yes  [x] no    I have checked the documentation for today s encounters and the above information has been reviewed and completed.

## 2021-08-12 ENCOUNTER — TELEPHONE (OUTPATIENT)
Dept: BEHAVIORAL HEALTH | Facility: HOSPITAL | Age: 66
End: 2021-08-12

## 2021-08-12 NOTE — TELEPHONE ENCOUNTER
Angel Stephenson, pt group home medical coordinator called to follow up with provider. She wants to know if there are after visit instructions and what might they be. Caller number [phone]

## 2021-08-13 NOTE — TELEPHONE ENCOUNTER
Wtr made 2nd attempt and staff indicated that caller (Angel) was not in until 3:00 p.m. today and requested a call back after that to speak to her.    Bethany Lima RN on 8/13/2021 at 2:17 PM

## 2022-01-27 ENCOUNTER — TELEPHONE (OUTPATIENT)
Dept: BEHAVIORAL HEALTH | Facility: HOSPITAL | Age: 67
End: 2022-01-27
Payer: COMMERCIAL

## 2022-01-27 ENCOUNTER — TELEPHONE (OUTPATIENT)
Dept: BEHAVIORAL HEALTH | Facility: CLINIC | Age: 67
End: 2022-01-27
Payer: COMMERCIAL

## 2022-01-27 NOTE — TELEPHONE ENCOUNTER
[facility] ([facility] (TC):     NURSING Bridging Transfer of care from Dr. Hebert to new longitudinal provider  _________________________________    This RN has consulted with provider & this Medication Management referral to the Transition Clinic is deemed   [x] Appropriate  [] Inappropriate     Based on the following additional information gathered: Pt of Dr. Hebert transferring to long-term provider, bridging care until new provider; directive for appt in Feb; had been scheduled w/ Dr. Hebert 2/8/22 at 1:15pm but would not need refills until Aug 2022    Contact w/ patient: This RN called pt, reviewed provider leaving clinic and they verbalized understanding. We scheduled for them to be seen in the [facility] w/ DION Cedillo on 3/1/22 at 10:30am in-person. For long-term outpt psych appt will schedule at check-out.    Bethany Lima RN on January 27, 2022 at 12:55 PM

## 2022-01-27 NOTE — TELEPHONE ENCOUNTER
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 - [facility] [address]  Phone: [phone]

## 2022-02-01 NOTE — TELEPHONE ENCOUNTER
PA Initiation    Medication: risperiDONE (RISPERDAL) 0.5 MG tablet  Insurance Company: [employer] - Phone [phone] Fax [phone]  Pharmacy Filling the Rx: [facility]. [place] [id number]  Filling Pharmacy Phone: [phone]  Filling Pharmacy Fax:    Start Date: 2/1/2022    [place] PRIOR AUTHORIZATION TEAM  Ph:[phone]

## 2022-02-02 NOTE — TELEPHONE ENCOUNTER
Called Kandi at [phone] at 11:03 2/2/22.  Left her a message that I started PA [facility]

## 2022-02-02 NOTE — TELEPHONE ENCOUNTER
[facility] called stating they need a prior auth for this medication. Pt has new insurance which requires prior authorization. Requesting Kandi callback at [phone].

## 2022-02-03 NOTE — TELEPHONE ENCOUNTER
Prior Authorization Approval    Authorization Effective Date: 2/3/2022  Authorization Expiration Date: 12/31/2022  Medication: risperiDONE (RISPERDAL) 0.5 MG tablet  Approved Dose/Quantity: 62  Reference #:     Insurance Company: [employer] - Phone [phone] Fax [phone]  Expected CoPay:       CoPay Card Available:      Foundation Assistance Needed:    Which Pharmacy is filling the prescription (Not needed for infusion/clinic administered): [facility] - [place], [place] - [address]  Pharmacy Notified: Yes  Patient Notified: Yes

## 2022-02-08 NOTE — TELEPHONE ENCOUNTER
Reason for call:  Medication   If this is a refill request, has the caller requested the refill from the pharmacy already? Yes  Will the patient be using a [facility]? No  Name of the pharmacy and phone number for the current request:     [facility] Karen Chau #: [phone]    Name of the medication requested: PA request for med: risperiDONE (RISPERDAL) 0.5 MG tablet, the pharmacy still has not received any follow up on this    Other request: pharmacist requesting a callback    Phone number to reach patient:    [facility] Karen Chau #: [phone]  Best Time:  asap    Can we leave a detailed message on this number?  Not Applicable    Travel screening: Not Applicable

## 2022-02-09 NOTE — TELEPHONE ENCOUNTER
Spoke to Julia at [facility], and she confirmed that the medication was delivered to the patient on 02/03/22.    Thanks,

## 2022-02-28 ENCOUNTER — TELEPHONE (OUTPATIENT)
Dept: BEHAVIORAL HEALTH | Facility: CLINIC | Age: 67
End: 2022-02-28

## 2022-02-28 NOTE — TELEPHONE ENCOUNTER
This RN was alerted to an error in provider schedule in that pt was scheduled for in-person visit on a day she is not in-clinic. This RN requested [facility] call pt to reschedule.    Wtr attempted to reach pt this afternoon but unable to reach - [facility] noted that   [10:02 AM] Colleen Elena  1/27/2022 1:11 PM  1/27/2022 2:09 PM By:  By: COLLEEN ELENA JORDAN L  Cancel Reason: Changed Appt Time, Date or Type (Pt request)  Referral Information    [10:02 AM] Colleen Elena  Patient talked to BA and asked them to change to this date and time    [10:15 AM] Bethany Lima  OK - can we reschedule them to a slot that works?    Pt should be rescheduled for either a telemed hub on 3/2/22 or to a different date of in-person slot. There are not concerns about med refills as pt has enough refills through August, 2022 based on med list.    Bethany Lima RN on 2/28/2022 at 1:55 PM

## 2022-02-28 NOTE — TELEPHONE ENCOUNTER
This RN called pt, he requested wtr speak to staff & reported Disha Rosenthal manages meds & appointments -     Wtr spoke to a different staff and this RN asked if they could switch to video or telemed hub or if they'd like to move appt to a different day. This staff person reported he could not make that decision & asked for call-back # which wtr provided [facility] phone # [phone].    Bethany Lima RN on 2/28/2022 at 3:38 PM

## 2022-03-01 ENCOUNTER — TELEPHONE (OUTPATIENT)
Dept: BEHAVIORAL HEALTH | Facility: CLINIC | Age: 67
End: 2022-03-01

## 2022-03-01 NOTE — TELEPHONE ENCOUNTER
"Wtr attempted to call pt's group home staff again to review changes for tomorrow's appt; wtr notes that in reschedule note \"video\" is selected but pt's appt type was not changed. No answer on the phone line.    Bethany Lima RN on 3/1/2022 at 9:49 AM    "

## 2022-03-01 NOTE — TELEPHONE ENCOUNTER
Received a call on TC queue from Yaa Ramsey from [facility] called back about appointment tomorrow. Requested call back at [phone]. Note will be routed to TC RN for follow up.    Karlee Ramirez   [facility] Coordinator

## 2022-03-01 NOTE — TELEPHONE ENCOUNTER
Wtr returned call to Catie (group home staff) and reviewed scheduling error, offered in-person appt later in the month or change to video. She requested to change to video appt & email link to [email or web address]    Wtr changed appt for tomorrow from in-person to video.    Bethany Lima RN on 3/1/2022 at 10:16 AM

## 2022-03-01 NOTE — PROGRESS NOTES
[facility]      Mental Health & Addiction Service Line    Transition Clinic: Psychiatry Note  Medication Management              Charts/documentation read prior to the appointment:  -8/3/2021    -2/3/2021      VISIT INFORMATION    Date:  2022     Number:  -Initial     Referral source:  -Bridging to long term outpatient psychiatry      Patient Identifying Information:  Legal name: Tavon Arias  Preferred name: Rivera  : 1955  Preferred pronouns: He/him      Participants:   -Patient  -Provider      Telehealth visit details:  Type of service:  Video  Patient location:  At home  Provider Location:  [facility]  Platform utilized:  [employer]    Start time: 10:37 am  End time:  10:56 am        HPI    Hx:    -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 KEI Hebert MD:  -10/2009  -Most recent evaluation: 8/3/2021    -----------------    -Patient denies any life events/stressors occurring in the interim hx  -Resides in a group home ... I think there are two other people here but   I'm not sure  -I get along/talk to one of the other group home members    -Frustrated because I have headaches on a daily basis ... whatever  I take for them doesn't work very well   -Denies: vision changes, worst headache of life, recent s/s of infection associated with headaches        PSYCHIATRIC ROS    Sleep:   -Getting 6 to 8 hours      Appetite/Weight Changes:   -Denies unintentional loss or gain > 10 lbs in the past 4 weeks        Energy Levels:   -It's alright, I don't do a whole lot most days        Depression/Anxiety:   -Mood is fine, I'm not depressed or overly anxious about anything      Suicidal ideations:   -Denies at this time      SIB:  -Denies hx or current engagement of self harm       Side effects:  -None reported           MENTAL HEALTH HISTORY    Suicide  attempts:  -Accidental overdose per heroin in 2006      Inpatient psychiatric hospitalizations:  -2x  -Most recent: 2006  -No hx of commitments       ECT:  -None reported         Medication Trials:      SSRIs:  -Citalopram 20 mg    Benzodiazepines:  -Clonazepam    Sleep aides:  -Trazodone          Family mental health, and chemical dependency history:  -Was reviewed within the EMR per: 8/3/2021 encounter by Dr. Eric MD          SUBSTANCE USE    Prior use:  -Polysubstance abuse (alcohol, cocaine, heroin)  -Previously attended c/d programming 6x        Current use:    Alcohol:   -None reported       Recreational Drugs:   -None reported        Medical Marijuana:  -None reported       Cigarettes per day:   -None reported       Chewing tobacco:   -None reported       Vaping:    -None reported       Caffeine intake:    -2 to 3 cups coffee per day  -Intermittently will have a soda             SOCIAL HISTORY    -Was reviewed within the EMR per: 8/3/2021 encounter by Dr. KEI Hebert MD  -The patient denies any changes to this information        MEDICAL HISTORY    Current:  -The problem list was reviewed prior to the appointment      Neurological:  -See problem list for TBI/seizure hx     -Per patient report he used to be managed by a neurologist   but currently isn't        MEDICATIONS      Current Outpatient Medications:      Acetaminophen (TYLENOL PO), , Disp: , Rfl:      albuterol (PROAIR HFA, PROVENTIL HFA, VENTOLIN HFA) 108 (90 BASE) MCG/ACT inhaler, Inhale 2 puffs into the lungs every 6 hours, Disp: , Rfl:      ASPIRIN PO, Take 81 mg by mouth daily , Disp: , Rfl:      benztropine (COGENTIN) 0.5 MG tablet, TAKE 1 TABLET BY MOUTH ONCE DAILY, Disp: 31 tablet, Rfl: 11     BENZTROPINE MESYLATE PO, Take 0.5 mg by mouth daily , Disp: , Rfl:      bisacodyl (DULCOLAX) 5 MG EC tablet, Take 5 mg by mouth daily as needed for constipation, Disp: , Rfl:      calcium carbonate-vitamin D (OYSTER SHELL CALCIUM/D) 500-200 MG-UNIT  tablet, Take 1 tablet by mouth daily, Disp: , Rfl:      calcium citrate-vitamin D (CITRACAL) 315-250 MG-UNIT TABS, Take by mouth daily (Patient not taking: Reported on 8/3/2021), Disp: , Rfl:      donepezil (ARICEPT) 10 MG tablet, TAKE 1 TABLET BY MOUTH EVERY MORNING., Disp: 31 tablet, Rfl: 11     gabapentin (NEURONTIN) 100 MG capsule, Take 100 mg by mouth 3 times daily, Disp: , Rfl:      Ipratropium-Albuterol (COMBIVENT RESPIMAT)  MCG/ACT inhaler, Inhale 1 puff into the lungs 4 times daily, Disp: , Rfl:      ketoconazole (NIZORAL) 2 % cream, Apply topically daily, Disp: , Rfl:      levETIRAcetam (KEPPRA) 100 MG/ML solution, Take 10 mg/kg by mouth 2 times daily (Patient not taking: Reported on 8/3/2021), Disp: , Rfl:      LOSARTAN POTASSIUM PO, Take 25 mg by mouth daily , Disp: , Rfl:      magnesium citrate solution, Take 296 mLs by mouth once, Disp: , Rfl:      METFORMIN HCL PO, Take 500 mg by mouth 2 times daily (with meals) , Disp: , Rfl:      multivitamin w/minerals (MULTI-VITAMIN) tablet, Take 1 tablet by mouth daily, Disp: , Rfl:      OMEPRAZOLE PO, Take 20 mg by mouth every morning , Disp: , Rfl:      oxybutynin (DITROPAN-XL) 5 MG 24 hr tablet, Take 5 mg by mouth 2 times daily , Disp: , Rfl:      Pediatric Multivitamins-Fl (MULTIVITAMIN WITH 1 MG FLUORIDE) 1 MG CHEW, Take 1 tablet by mouth daily, Disp: , Rfl:      polyethylene glycol (MIRALAX/GLYCOLAX) powder, Take 1 capful by mouth daily, Disp: , Rfl:      psyllium (REGULOID) 58.6 % powder, Take 6 g by mouth daily, Disp: , Rfl:      risperiDONE (RISPERDAL) 0.5 MG tablet, TAKE 1 TABLET BY MOUTH TWICE DAILY., Disp: 62 tablet, Rfl: 11     risperiDONE (RISPERDAL) 1 MG/ML oral solution, Take 0.5 mg by mouth At Bedtime (Patient not taking: Reported on 8/3/2021), Disp: , Rfl:      senna-docusate (SENOKOT-S;PERICOLACE) 8.6-50 MG per tablet, Take 1 tablet by mouth daily, Disp: , Rfl:      sertraline (ZOLOFT) 100 MG tablet, Take 150 mg by mouth daily, Disp: ,  Rfl:      sertraline (ZOLOFT) 100 MG tablet, TAKE ONE AND ONE-HALF TABLETS (150MG) BY MOUTH EVERY MORNING, Disp: 45 tablet, Rfl: 11     TRAMADOL HCL PO, , Disp: , Rfl:      traZODone (DESYREL) 50 MG tablet, Take  mg by mouth nightly as needed for sleep, Disp: , Rfl:      triamcinolone (KENALOG) 0.025 % cream, Apply topically 2 times daily, Disp: , Rfl:           If a controlled substance has been prescribed during the appointment:    -The [place] Prescription Monitoring Program has been reviewed and there are no current concerns with: diversionary activity, early refill requests, and or obtaining the medication from multiple providers.          VITALS    BP Readings from Last 3 Encounters:   10/15/19 116/73   03/29/17 129/88   06/27/14 121/78       Pulse Readings from Last 3 Encounters:   10/15/19 59       Wt Readings from Last 3 Encounters:   03/20/19 83.9 kg (185 lb)   12/26/18 90.3 kg (199 lb)   10/03/18 90.3 kg (199 lb)               LABS    The following have been reviewed prior to or during the appointment:    -10/19/2021          SCALES      No flowsheet data found.     No flowsheet data found.             MENTAL STATUS EXAMINATION    Appearance: Adequately Groomed, Attire Appropriate for the Season  General Behavior:  Cooperative, Direct Eye Contact  Speech: Slow rate, hard to project voice/enunciate   Musculoskeletal:    -Gait not observed during t.h. visit  -No facial tics/tremors observed   Mood:  It's fine  Affect: Appropriate to Content of Speech and Circumstances  Attention: Intact  Orientation:  Person, Knows he stays in a G.H.  Thought Associations:  Intact  Thought Content: Reality based, Without Suicidal Ideations   Thought Processes: Organized, Slow rate  Memory: Poor short term memory ... doesn't know if he takes meds or not  Language: Intact  Intellect/Fund of Knowledge:  Below Average; Low GAMALIEL  Judgement:  Fair to Poor  Insight: Fair to Poor        ASSESSMENT/CLINICAL  IMPRESSIONS    Summary:    Serenity Arias is a 66 y/o male with a history of: Polysubstance Use (alcohol, cocaine, heroin)  and TBI resulting in a mood disorder and seizures.    Psychotropics were previously managed by Dr. KEI Hebert MD since 2009, last evaluation 8/3/2021.    The patient seems to be stable on current medication regimen, although it is difficult to tell because he is a poor historian due to memory issues.   Throughout the interview, Rivera states his biggest concern is ongoing headaches but doesn't outline any red flag symptoms.      Initially called the group home: [phone] and staff set up an ipad/tablet for the patient.  Due to limited mobility, Rivera was unable to get the group home member's attention after he/writer checked in.    Writer tried to call the group home 4x from 11:00 to 11:30 am and again later in the week on 3/4/2022 from 8:30 to 9:00 am, however no one answered.   Will continue to try to reach to obtain collateral information as well as update lab orders have been placed.          DSM-V and or working diagnosis:    1.  TBI resulting in mood disorder    2.  At risk for side effect of medication     3.  History of Polysubstance abuse        SAFETY EVALUATION:  Suicidal ideations:  -Denies  Homicidal ideations:  -Denies  Access to guns:   -None; lives in the group home setting  Risk factors:  -Hx of Polysubstance abuse  -Male, age  Protective and mitigating factors:  -Involved in outpatient mental health services  -Lives in a group home setting/has staff monitoring  Risk assessment:  -Low            TREATMENT PLAN      Medications:    Continue:  -Cogentin 0.5 mg daily     -Aricept 10 mg in AM    -Risperdal 0.5 mg twice daily; TDD = 1 mg    -Zoloft 150 mg (1.5 tabs) in AM    -Trazodone  mg (1 to 2 tabs) as needed for sleep        Labs:  -Vitamin B12, D  -Prolactin       Therapy:  -None at this time  -Probably would benefit from O.T.      Non-pharmacological  modalities:  -Did not discuss        Return to Clinic or Referrals:  -Please follow up at the Transition Clinic for medication management in: 3 to 6 months if   unable to re-establish long term outpatient psychiatric care    -Referral placed      Total time:  41 minutes per:    -Review of EMR or paper documentation = 11 minutes   -Appointment time = 19 minutes  -Attempts at care coordination/reaching group home = 11 minutes        Sydine SOLIZ-CNP,  [facility]          --------------------------------------------------------------------------------------------------------------------------------        TREATMENT RISK STATEMENT    The risks, benefits, alternatives, and potential adverse effects have been explained and are understood by the patient.  The patient agrees to the treatment plan with their ability to do so.      The patient knows to call the clinic: [phone]  for any problems or concerns until the next psychiatry visit, regardless if it is within or outside of the [facility] system.     If unable to reach clinic staff (via phone call or medical messaging) during the normal business hours: 8:00 am to 4:30 pm then it is recommended accessing the nearest: emergency department, urgent care facility, or utilizing local (varies based on county of residence) and national crisis #'s or text messaging services for immediate assistance.          --------------------------------------------------------------------------------------------------------------------------------        If applicable the following has been discussed with the patient, parent/guardian, and or attending family member during the appointment:      1. Risks of polypharmacy and possible drug interactions with current medication list + common OTC products, herbs, and supplements.    Moving forward, it is suggested to intermittently check-in with a clinic or retail pharmacist whenever new medications or OTC/h/s are  consumed.    2. Recommendation to adhere to CDC guidelines as it relates alcohol consumption.  If taking benzodiazepines, you should abstain from alcohol intake due to increased risks of CNS and respiratory depression, as well as psychomotor impairment.    3. If possible, it is recommended to avoid concurrent use of prescribed:  opioids  +  benzodiazepines due to increased risks of CNS and respiratory depression, as well as the increased risk of overdose.     4. Recommendation to minimize and or abstain from THC use (unless the pt. is prescribed medical marijuana).    5. Recommendation to abstain from illicit substances including but not limited to the following: heroin, street fentanyl, cocaine, methamphetamines, and bath salts.    6. Do not take opioids, stimulants, and or other prescription medications unless they are specifically prescribed for you.    7. Recommendation to abstain from: alcohol,  tobacco/smoking, vaping, THC, and all illicit substances if trying to become or are pregnant.    8. Black Box Warnings associated with the prescribed psychotropic(s).    9. Potential adverse effects of antipsychotics including but not limited to the following: weight gain, metabolic syndrome, EPS/Tardive Dyskinesias.    10. Potential CV and neurological adverse effects of stimulants including but not limited to the following:  sudden death, MI, stroke, HTN, cardiomyopathy (long term use) as well as seizures.

## 2022-03-02 ENCOUNTER — VIRTUAL VISIT (OUTPATIENT)
Dept: BEHAVIORAL HEALTH | Facility: CLINIC | Age: 67
End: 2022-03-02
Attending: NURSE PRACTITIONER
Payer: MEDICARE

## 2022-03-02 DIAGNOSIS — F39 MOOD DISORDER (H): Primary | ICD-10-CM

## 2022-03-02 DIAGNOSIS — Z91.89 AT RISK FOR SIDE EFFECT OF MEDICATION: ICD-10-CM

## 2022-03-02 PROCEDURE — 99203 OFFICE O/P NEW LOW 30 MIN: CPT | Mod: GT | Performed by: NURSE PRACTITIONER

## 2022-03-02 NOTE — PATIENT INSTRUCTIONS
-Please follow up at the Transition Clinic for medication management in: 3 to 6 months if   unable to re-establish long term outpatient psychiatric care    -Referral placed

## 2022-03-02 NOTE — PROGRESS NOTES
" This video/telephone visit will be conducted virtually between you and your provider. We have found that certain health care needs can be provided without the need for an in-person physical exam. This service lets us provide the care you need with a video /telephone conversation. If a prescription is necessary we can send it directly to your pharmacy. If lab work is needed we can place an order for that and you can then stop by our lab to have the test done at a later time.\"   Just as we bill insurance for in-person visits, we also bill insurance for video/telephone visits. If you have questions about your insurance coverage, we recommend that you speak with your insurance company.      Patient/Parent has given verbal consent for video/Telephone visit? yes    Patient would like the video visit invitation sent by: send to email    Patient verified allergies, medications and pharmacy via phone. Patient states they are ready for visit.      Mental Health &Addiction (MH&A)Transition Clinic (TC):     Provides Patient Support While Waiting to Access Programmatic and Outpatient MH&A Care and Provides [facility] Crisis Assessment Services     NURSING INTAKE  ____________________________________________________    \"The Transition Clinic is a temporary psychiatry service that helps to bridge the time to your next appointment. It is not intended to be a long-term service and you are expected to attend your scheduled appointment with your next provider.\"  [x] Patient/Parent verbalized understanding    If you need support between appointments, please call [phone] and let them know you're seen by [facility] Psychiatry. You may also send a [employer] message to reach us.    General-     Most pressing MH needs at this time: pt denies any concerns       Any physical health conditions or diagnoses we should be aware of or that are impacting you: no - just usual        Medications-     Injectable medications currently " prescribed: no   If yes, do you need an appointment for the next injection:     Any Controlled Substances that you are prescribed: gabapentin      Primary care provider: Trung Harden MD        KERA-7 scores: pt declines questionnaire today    PHQ-9 scores: pt declines questionnaire today      Anything the provider should be aware of for today's appointment: group home staff assisting to manage appointment    New (awaiting) Mental health provider or next programming: pt will need to schedule at check-out today or route to TC Coordinator for community provider      Date of scheduled apt: TBD         Bethany Lima RN on March 2, 2022 at 8:37 AM       MH&A TC   NURSING Post-Appointment Chart-check:    Correct pharmacy verified with patient and updated in chart? [x] yes []no    Charge captured ? [] yes  [x] no    Medications ordered this visit were e-scribed.  Verified by order class [] yes  [x] no    List Medications:    Medication changes or discontinuations were communicated to patient's pharmacy: [] yes  [x] no    UA collected [] yes  [] no  [x] n/a-virtual     Future appointment was made: [] yes  [x] no  [] n/a    Dictation completed at time of chart check: [] yes  [x] no    I have checked the documentation for today s encounters and the above information has been reviewed and completed.      Bethany Lima RN on March 2, 2022 at 4:22 PM

## 2022-03-02 NOTE — Clinical Note
I placed the referral for long term outpatient psychiatry.    If staying in [facility] ... Dr. Ferguson due to neuro hx    If out in the community: Dr. Matthew Muñiz[facility] for geriatric psych or [facility].    Thanks for your help.    Sydnie

## 2022-03-07 ENCOUNTER — TELEPHONE (OUTPATIENT)
Dept: BEHAVIORAL HEALTH | Facility: CLINIC | Age: 67
End: 2022-03-07

## 2022-03-07 NOTE — TELEPHONE ENCOUNTER
Sydnie Cedillo APRN CNP [place], Bethany MONGE RN    I was never able to reach the group home the day of the 3/2/2022 appointment or on Friday 3/4/2022 to get collateral information.   This is outlined in the progress note.         I guess maybe try one more time this week and if able to leave a message (I never could ... it just rang 4 or 6x and didn't go to ) labs have been ordered and a referral placed for geriatric psychiatry.     Thanks for your help.   Sydnie     Per provider directive, this RN called Yaa at [phone] - reviewed that pt should go into a [facility] lab to get labs drawn. She reported she would get him in to do this.    Wtr notes that pt has been scheduled w/ Dr. Ferguson for 9/7/22.    Bethany Lima, RN on 3/7/2022 at 1:24 PM

## 2022-10-03 ENCOUNTER — TELEPHONE (OUTPATIENT)
Dept: PSYCHIATRY | Facility: CLINIC | Age: 67
End: 2022-10-03

## 2022-10-03 ENCOUNTER — VIRTUAL VISIT (OUTPATIENT)
Dept: PSYCHIATRY | Facility: CLINIC | Age: 67
End: 2022-10-03
Attending: NURSE PRACTITIONER
Payer: COMMERCIAL

## 2022-10-03 DIAGNOSIS — F06.30 MOOD DISORDER DUE TO OLD HEAD INJURY: ICD-10-CM

## 2022-10-03 DIAGNOSIS — F02.80 DEMENTIA DUE TO HEAD TRAUMA WITHOUT BEHAVIORAL DISTURBANCE (H): ICD-10-CM

## 2022-10-03 DIAGNOSIS — F10.11 ALCOHOL ABUSE, IN REMISSION: ICD-10-CM

## 2022-10-03 DIAGNOSIS — F06.30 MOOD DISORDER DUE TO OLD HEAD INJURY: Primary | ICD-10-CM

## 2022-10-03 DIAGNOSIS — F39 MOOD DISORDER (H): ICD-10-CM

## 2022-10-03 DIAGNOSIS — S09.90XS MOOD DISORDER DUE TO OLD HEAD INJURY: Primary | ICD-10-CM

## 2022-10-03 DIAGNOSIS — S09.90XS DEMENTIA DUE TO HEAD TRAUMA WITHOUT BEHAVIORAL DISTURBANCE (H): ICD-10-CM

## 2022-10-03 DIAGNOSIS — F41.1 GAD (GENERALIZED ANXIETY DISORDER): ICD-10-CM

## 2022-10-03 DIAGNOSIS — F14.11 COCAINE ABUSE IN REMISSION (H): ICD-10-CM

## 2022-10-03 DIAGNOSIS — S09.90XS MOOD DISORDER DUE TO OLD HEAD INJURY: ICD-10-CM

## 2022-10-03 PROCEDURE — 90792 PSYCH DIAG EVAL W/MED SRVCS: CPT | Mod: GT | Performed by: NURSE PRACTITIONER

## 2022-10-03 RX ORDER — GABAPENTIN 100 MG/1
100 CAPSULE ORAL 3 TIMES DAILY
Qty: 180 CAPSULE | Refills: 3 | Status: SHIPPED | OUTPATIENT
Start: 2022-10-03 | End: 2022-10-04

## 2022-10-03 NOTE — PATIENT INSTRUCTIONS
Plan:  1.Patient will take the medications as prescribed.   Medications: Zoloft 150 mg qam for depression  Risperdal 0.5 mg two times a day for mood   Cogentin 0.5 mg daily for involuntary movement   Aricept  10 mg every am for memory loss  Start taking gabapentin 200 mg 3 times daily for anxiety and generalized body pain. Stop taking Gabapentin 100 mg three times daily  Patient will not stop taking medications or adjust them without consulting with the provider.  2.Staff will call with any problems between 2 visits.   3.[facility] staff will take the patient to the emergency room if not feeling safe  or unable to function in the community, or if suicidal, homicidal or hearing voices or having paranoia.   4. [facility] staff will watch his diet and exercise.   5.Patient will see non psychiatric providers for non psychiatric disorders.   6. Next appointment in 2 months  7.  staff will continue to encourage him to use a walker instead a wheelchair  8. I recommend that his sister delores

## 2022-10-03 NOTE — TELEPHONE ENCOUNTER
Reason for Call: Medication Clarification    Detailed comments: Received call from Zachery ([facility]) requesting clarification on how the patient should take Gabapentin.     Phone Number can be reached at: [phone]    Best Time: Anytime     Can we leave a detailed message on this number? Yes    Call taken on 10/3/2022 at 12:55 PM by Favian Hill

## 2022-10-03 NOTE — PROGRESS NOTES
"PSYCHIATRIC DIAGNOSTIC ASSESSMENT      Name:  Tavon Arias  : 1955    Tavon Arisa is a 67 year old male who is being evaluated via a billable Video visit.      Telemedicine Visit: The patient's condition can be safely assessed and treated via synchronous audio and visual telemedicine encounter.      Reason for Telemedicine Visit: COVID 19 pandemic and the social and physical recommendations by the CDC and MD., Patient has requested telehealth visit and Patient unable to travel      Originating Site (Patient Location): [facility]    Distant Site (Provider Location): [facility] Outpatient Setting: [facility]    Consent:  The patient/guardian has verbally consented to: the potential risks and benefits of telemedicine (video visit or phone) versus in person care; bill my insurance or make self-payment for services provided; and responsibility for payment of non-covered services.     Mode of Communication:  [employer]     As the provider I attest to compliance with applicable laws and regulations related to telemedicine.                                              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  Been a poor historian related to TBI, this visit was attended by patient's caregiver and the group home.  Patient reports he experiences depression once in a while, stating he thinks much about his wife and children who are currently living in [place], [place].  Patient stated he cannot remember the last time he spoke with his family.  Patient stated none of his " children has visited him since living in the group home.  Patient reports he is not sure if his children still consider him as their father.  Patient stated he misses his family so much.  Of note, patient was  but currently  with 2 children but has no contact with family.  He is currently on social security disability income.  Patient currently lives in a group home with the staff who helps administer medication and help with ADL when needed.  Christie, patient caregiver stated patient has been generally stabilized on his medication, though stated patient can be hyperverbal and irritable once in a while whenever he is frustrated or does not get his way.  For instance, he stated patient will be increasingly irritable whenever it is not hard to go out to smoke.  She also mentioned patient complains of generalized body pain as well as headache as he continued to request additional pain medication stating Tylenol is not effective.  In addition, the caregiver reported patient had a fall about 2 months ago, stating he has been complaining of increased generalized body pain since then.  She stated patient did not hit his head during this fall.  I encouraged her to let patient primary aware of this for if they have not done so.  She verbalized good understanding.  Patient reports he experiences headache about 5 times in the week.  Patient stated he has been having increasing anxiety and feels as though he is on edge lately due to helplessness related to missing his family as well as continuous headache.  Patient reported he has not had any seizures for a long time.  Patient currently denies both suicidal and homicidal ideation.  He also denies both auditory and visual hallucination.  Patient reports sleep and appetite are great with no concern.  Patient could not tell what day it is today or what year we are currently in.  Patient struggled to remember which months where in but able to guess right after writer  gave options.  Patient reports history of psychiatric hospitalization stated he had been to Hospital for psychiatric purposes 2 times in the past but could not remember when.  Per chart review, patient last hospitalization was in 2006.  Patient reports history of polysubstance abuse, stating he had not done it all, referring to all illicit substances.  Patient stated he has remained sober for more than 10 years.  I increased patient's gabapentin to 200 mg 3 times daily from currently 100 mg to effectively manage anxiety as well as generalized body pain.  I will encourage patient and caregiver to follow up with the patient primary physician regarding ongoing headaches.  Patient reports he has been craving for cigarette lately as he continues to refuse Nicorette gum.  I encouraged patient to continue utilizing Nicorette gum to help manage nicotine craving.  Patient to return to clinic in 2 months for follow-up..  PSYCHIATRIC HISTORY:   History of Psychiatric Hospitalizations:   - Inpatient: Yes, most recently in 2006  - IOP/PHP/Day treatment: Denies  History of Suicidal Ideation:Accidental overdose on heroin in 2006 and he had anoxic brain  History of Suicide Attempts: Accidental overdose on heroin in 2006 and he had anoxic brain  History of Self-injurious Behavior: Denies a history of SIB.  Current:  No  History of Violence/Aggression: History of Verbal agression  History of Commitment? Denies  Electroconvulsive Therapy (ECT): Denies    PSYCHIATRIC REVIEW OF SYSTEMS:   Psychiatric Review of Systems:   Depression:   Reports: depressed mood, helplessness, irritability, low tolerance  Nila:   Denies: sleeplessness, increased goal-directed activities, abrupt increase in energy pressured speech  Psychosis:   Denies: visual hallucinations, auditory hallucinations, paranoia  Anxiety:   Reports: excessive worries that are difficult to control  PTSD:   Denies: re-experiencing past trauma, nightmares, increased arousal,  avoidance of traumatic stimuli, impaired function.  Denies: re-experiencing past trauma, nightmares, increased arousal, avoidance of traumatic stimuli, impaired function.  OCD:   Denies: obsessions, checking, symmetry, cleaning, skin picking.  Eating Disorder:   Denies: restriction, binging, purging.    Sleep:   Denies: Sleep deprivation    MEDICATIONS                                                                                                Current Outpatient Medications   Medication Sig     Acetaminophen (TYLENOL PO)      albuterol (PROAIR HFA, PROVENTIL HFA, VENTOLIN HFA) 108 (90 BASE) MCG/ACT inhaler Inhale 2 puffs into the lungs every 6 hours     ASPIRIN PO Take 81 mg by mouth daily      benztropine (COGENTIN) 0.5 MG tablet TAKE 1 TABLET BY MOUTH ONCE DAILY     BENZTROPINE MESYLATE PO Take 0.5 mg by mouth daily     bisacodyl (DULCOLAX) 5 MG EC tablet Take 5 mg by mouth daily as needed for constipation     calcium carbonate-vitamin D (OYSTER SHELL CALCIUM/D) 500-200 MG-UNIT tablet Take 1 tablet by mouth daily     calcium citrate-vitamin D (CITRACAL) 315-250 MG-UNIT TABS Take by mouth daily     donepezil (ARICEPT) 10 MG tablet TAKE 1 TABLET BY MOUTH EVERY MORNING.     gabapentin (NEURONTIN) 100 MG capsule Take 100 mg by mouth 3 times daily     Ipratropium-Albuterol (COMBIVENT RESPIMAT)  MCG/ACT inhaler Inhale 1 puff into the lungs 4 times daily     ketoconazole (NIZORAL) 2 % cream Apply topically daily     levETIRAcetam (KEPPRA) 100 MG/ML solution Take 10 mg/kg by mouth 2 times daily     LOSARTAN POTASSIUM PO Take 25 mg by mouth daily      magnesium citrate solution Take 296 mLs by mouth once     METFORMIN HCL PO Take 500 mg by mouth 2 times daily (with meals)      multivitamin w/minerals (THERA-VIT-M) tablet Take 1 tablet by mouth daily     OMEPRAZOLE PO Take 20 mg by mouth every morning      oxybutynin (DITROPAN-XL) 5 MG 24 hr tablet Take 5 mg by mouth 2 times daily      Pediatric Multivitamins-Fl  "(MULTIVITAMIN WITH 1 MG FLUORIDE) 1 MG CHEW Take 1 tablet by mouth daily     polyethylene glycol (MIRALAX/GLYCOLAX) powder Take 1 capful by mouth daily     psyllium (REGULOID) 58.6 % powder Take 6 g by mouth daily     risperiDONE (RISPERDAL) 0.5 MG tablet TAKE 1 TABLET BY MOUTH TWICE DAILY.     senna-docusate (SENOKOT-S;PERICOLACE) 8.6-50 MG per tablet Take 1 tablet by mouth daily     sertraline (ZOLOFT) 100 MG tablet Take 1.5 tablets (150 mg) by mouth daily     TRAMADOL HCL PO      triamcinolone (KENALOG) 0.025 % cream Apply topically 2 times daily     No current facility-administered medications for this visit.       DRUG MONITORING:  [place] Prescription Monitoring Program evaluating controlled substances in the last year in [place]:  The [place] Prescription Monitoring Program has been reviewed and there are no current concerns with: diversionary activity, early refill requests, and or obtaining the medication from multiple providers      PAST PSYCHOTROPIC MEDICATIONS:  Keppra, Trazodone, citalopam    VITALS   There were no vitals taken for this visit.     BP Readings from Last 1 Encounters:   10/15/19 116/73     Pulse Readings from Last 1 Encounters:   10/15/19 59     Wt Readings from Last 1 Encounters:   03/20/19 83.9 kg (185 lb)     Ht Readings from Last 1 Encounters:   10/15/19 1.778 m (5' 10\")     Estimated body mass index is 26.54 kg/m  as calculated from the following:    Height as of 10/15/19: 1.778 m (5' 10\").    Weight as of 3/20/19: 83.9 kg (185 lb).      PERTINENT HISTORY   PAST MEDICAL HISTORY:   Past Medical History:   Diagnosis Date     Anoxic brain injury (H)      Atrial fibrillation (H)      Cardiac arrest (H)      Cocaine abuse (H)      Cocaine abuse, in remission (H)     Created by Conversion      COPD (chronic obstructive pulmonary disease) (H)      Hepatitis A      Hypertension      Mood disorder (H)      Opioid abuse, in remission (H)     Created by Conversion [facility] Annotation: Aug 11 "   9:08Chas Cruzy: HEROIN      Other and unspecified alcohol dependence, in remission     Created by Conversion      Type 2 diabetes mellitus without complications (H)        PAST SURGICAL HISTORY:   Past Surgical History:   Procedure Laterality Date     CHOLECYSTECTOMY       COLONOSCOPY  2014    Procedure: COLONOSCOPY;  Surgeon: Familia Bain MD;  Location:  GI     COLONOSCOPY N/A 3/29/2017    Procedure: COMBINED COLONOSCOPY, SINGLE OR MULTIPLE BIOPSY/POLYPECTOMY BY BIOPSY;  Surgeon: Cassie Martin MD;  Location:  GI     colostomy and takedown       ZZC PART REMOVAL COLON W END COLOSTOMY      Description: Partial Colectomy, End Colostomy & Distal Segment Closure;  Proc Date: 2003;  Comments: SIGMOID RESECTION AND CROSS POUCH FOR PERF DIVERTICULITIS       FAMILY HISTORY: No family history on file.    SOCIAL HISTORY:   Social History     Tobacco Use     Smoking status: Former Smoker     Packs/day: 1.00     Types: Cigarettes, Cigarettes     Quit date: 10/13/2014     Years since quittin.9     Smokeless tobacco: Former User     Tobacco comment: stopped smoking per staff   Substance Use Topics     Alcohol use: No         Seizures or Head Injury: Reports history of seizure and TBI  History of cardiac disease, rheumatic fever, fainting or dizziness, especially with exercise, seizures, chest pain or shortness of breath with exercise, unexplained change in exercise tolerance, palpitations, high blood pressure, or heart murmur?   No    LABS & IMAGING                                                                                                                  No lab results found.  No lab results found.  Recent Labs   Lab Test 16  0925   CHOL 139   LDL 80   HDL 37*   TRIG 111   A1C 5.9     No lab results found.  No results found for: FCI839, BFCQ156, [id number], VITD3, D2VIT, D3VIT, DTOT, [id number], [id number], [id number], [id number], [id number], [id number]     ALLERGY  & IMMUNIZATIONS       Allergies   Allergen Reactions     Adhesive Tape Swelling     Latex        FAMILY MEDICAL HISTORY:         Family history of sudden or unexplained death or an event requiring resuscitation in children or young adults, cardiac arrhythmias (eg, Abbi-Parkinson-White syndrome), long QT syndrome, catecholaminergic paroxysmal ventricular tachycardia, Brugada syndrome, arrhythmogenic right ventricular dysplasia, hypertrophic cardiomyopathy, dilated cardiomyopathy, or Marfan syndrome?  No    FAMILY PSYCHIATRIC HISTORY:   Psychiatric: Questionable  Suicide attempt: Questionable  Chemical dependency: Positive  Diabetes: Positive    SIGNIFICANT SOCIAL/FAMILY HISTORY:                                           No abuse.    His parents  when he was 20 years old.    He was  and .    He has 2 children.  No contact.    He is on SSDI.  He lives in a group home.    He has conservator for financial decisions    LEGAL: Denies     SUBSTANCE USE HISTORY    Chemical dependency history: Patient had 6 chemical dependency treatments more than 10 years ago  Hx of polysubstance abuse - heroine, cocaine, Alcohol    MEDICAL REVIEW OF SYSTEMS:   Ten system review was completed with pertinent positives noted above    MENTAL STATUS EXAM:   Mental Status Examination (limited due to video virtual visit format):  General:  cooperative  Orientation: Oriented to self, completely disoriented in time and its chronic, partially oriented in place  Speech: Impaired articulation due to TBI  Language: normal naming  Thought process: [place]  Thought content: Denies hallucinations and delusions   Suicidal thoughts: Denies  Homicidal thoughts: Denies  Associations: connected   Affect: irritable due to headache   Mood: depressed   Intellectual functioning: Decreased due to TBI  Memory: Decreased due to TBI   Fund of knowledge: Decreased due to TBI   Attention and concentration: Seems to be at baseline  Gait: I cannot  assess it because this is phone visit, but the staff says that he uses walker at home  Psychomotor activity: Mild agitation in his voice  Muscles: I cannot assess it because this is phone visit  InSight and judgment: Limited       SAFETY   Feels safe in home: Yes   Suicidal ideation: Denies  History of suicide attempts:  Yes   Hx of impulsivity: No     DSM 5 DIAGNOSIS:   1. 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 Alcohol abuse, in remission    5. Cocaine abuse in remission (H)    6. 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  Patient with a history of polysubstance abuse and mood disorder related to TBI currently lives in a group home.  He complains of increased anxiety and low frustration tolerance.  All his medications are administered by the group home staff.  Patient who is a poor historian report he misses his family who currently lives in [place], [place].  Apart from occasional anxiety and frustration related to not having his way to smoke while having intermittent headache, he is generally doing well psychiatrically.  I titrated gabapentin to 200 mg 3 times daily to effectively manage anxiety as well as generalized body pain.  I encouraged patient and his caregiver to follow up with a primary physician to adequately manage ongoing headache.    Plan:  1.Patient will take the medications as prescribed.   Medications: Zoloft 150 mg qam for depression  Risperdal 0.5 mg two times a day for mood   Cogentin 0.5 mg  daily for involuntary movement   Aricept  10 mg every am for memory loss  Start taking gabapentin 200 mg 3 times daily for anxiety and generalized body pain. Stop taking Gabapentin 100 mg three times daily  Patient will not stop taking medications or adjust them without consulting with the provider.  2.Staff will call with any problems between 2 visits.   3.[facility] staff will take the patient to the emergency room if not feeling safe  or unable to function in the community, or if suicidal, homicidal or hearing voices or having paranoia.   4. [facility] staff will watch his diet and exercise.   5.Patient will see non psychiatric providers for non psychiatric disorders.   6. Next appointment in 2 months  7.  staff will continue to encourage him to use a walker instead a wheelchair  8. I recommend that his sister becomes his gardian due to worsening cognitive abilities and TBI  9. Next appointment in 2 months       CONSULTS/REFERRALS:   Continue therapy  None at this time  Coordinate care with therapist as needed    MEDICAL:   None at this time  Coordinate care with PCP (Trung Harden) as needed  Follow up with primary care provider as planned or for acute medical concerns.    PSYCHOEDUCATION:  Medication side effects and alternatives reviewed. Health promotion activities recommended and reviewed today. All questions addressed. Education and counseling completed regarding risks and benefits of medications and psychotherapy options.  Consent provided by patient/guardian  Call the psychiatric nurse line with medication questions or concerns at [phone].  MyChart may be used to communicate with your provider, but this is not intended to be used for emergencies.  SEROTONIN SYNDROME:  Discussed risks of Serotonin syndrome (ie, serotonin toxicity) which is a potentially life-threatening condition associated with increased serotonergic activity in the central nervous system (CNS). It is seen with therapeutic medication  use, inadvertent interactions between drugs, and intentional self-poisoning. Serotonin syndrome may involve a spectrum of clinical findings, which often include mental status changes, autonomic hyperactivity, and neuromuscular abnormalities.    FIRST GENERATION ANTIPSYCHOTIC/ SECOND GENERATION ANTIPSYCHOTIC USE:  Atypical need for cardiometabolic monitoring with medication- B/P, weight, blood sugar, cholesterol.  Need to monitor for abnormal movements taught  SLEEP HYGIENE: establish a sleep routine, limit screen time 1 hour prior to bed, use bed for sleep only, take sleep/medications on time (including sleepy time tea, trazadone or herbal treatments such as melatonin), aroma therapy, limit caffeine/sugar, yoga, guided imagery, stretch, meditation, limit naps to 20 minutes, make a temperature change in the room, white noise, be mindful of slowing down breathing, take a warm bath/shower, frequently wash sheets, and journaling.   Medlineplus.gov is information for patients.  It is run by the [employer] and it contains information about all disorders, diseases and all medications.      COMMUNITY RESOURCES:    CRISIS NUMBERS: Provided in AVS 10/3/2022  National Suicide Prevention Lifeline: 2-901-346-TALK (670-092-3937)  [employer]/resources for a list of additional resources (SOS)            [facility] - [phone]   Urgent Care Adult Mental Ytsepb-[phone] mobile unit/ 24/7 crisis line  [facility] -[phone]   COPE 24/7 [place] Mobile Team -[phone] (adults)/ [phone] (child)  Poison Control Center - 1-860.123.7146    OR  go to nearest ER  Crisis Text Line for any crisis 24/7 send this-   To: 996292   [facility]  [phone]  National Suicide Prevention Lifeline: 460.623.4486 (TTY: 672.257.6449). Call anytime for help.  (www.suicidepreventionlifeline.org)  National [place] [employer] Mental Illness (www.bell.org):  736-169-1986 or 051-871-5841.   Mental Health Association (www.mentalhealth.org): 604.365.6965 or 282-255-7854.  [place] Crisis Text [place]: Text [place] to 490591  Suicide LifeLine Chat: suicidepreventionlifeline.org/chat    ADMINISTRATIVE BILLING:     Time spent interviewing patient, reviewing referral documents, obtaining and reviewing outside records, communication with other health specialists, and preparing this report.    Video/Phone Start Time: 1055am  Video/Phone End Time:   1155am    Greater than 50% of time was spent in counseling and coordination of care regarding above diagnoses and treatment plan.    Patient Status:  Our psychiatry providers act as a specialty service for Primary Care Providers in the [facility] that seek to optimize medications for unstable patients.  Once medications have been optimized, our providers discharge the patient back to the referring Primary Care Provider for ongoing medication management.  This type of system allows our providers to serve a high volume of patients. At this time  Patient will continue to be seen for ongoing consultation and stabilization.    Signed:   Marcia Singleton, MSN, APRN, PMHNP-BC  [facility]  Chart documentation done in part with Dragon Voice Recognition software.  Although reviewed after completion, some word and grammatical errors may remain.

## 2022-10-03 NOTE — NURSING NOTE
This video/telephone visit will be conducted via a call between you and your physician/provider. We have found that certain health care needs can be provided without the need for an in-person physical exam. This service lets us provide the care you need with a video /telephone conversation. If a prescription is necessary we can send it directly to your pharmacy. If lab work is needed we can place an order for that and you can then stop by our lab to have the test done at a later time.    Just as we bill insurance for in-person visits, we also bill insurance for video/telephone visits. If you have questions about your insurance coverage, we recommend that you speak with your insurance company.    Patient has given verbal consent for video/Telephone visit? Yes, ana-caregiver    Patient would like a video visit, please connect/call : [email or web address]  Reason for visit: new pt/intake  Anything the provider should be aware of for today's appointment: per ana, no questions/concerns at this time    Patient verified allergies, medications and pharmacy via phon.    No phq/kera completed today. Per ana, pt does not communicate well so does not express his feelings. Ana is unsure so questionnaires were not completed today     KERA-7 scores:  No flowsheet data found.    PHQ-9 scores: No flowsheet data found.    Patient states they are ready for visit.    Andrew Castro MA October 3, 2022 11:01 AM

## 2022-10-04 RX ORDER — GABAPENTIN 100 MG/1
200 CAPSULE ORAL 3 TIMES DAILY
Qty: 180 CAPSULE | Refills: 3 | COMMUNITY
Start: 2022-10-04 | End: 2023-01-09

## 2022-10-04 NOTE — TELEPHONE ENCOUNTER
RN reviewed script instructions for pharmacy. Reviewed last visit note from 10/3/2022:    Plan:  1.Patient will take the medications as prescribed.   Medications: Zoloft 150 mg qam for depression  Risperdal 0.5 mg two times a day for mood   Cogentin 0.5 mg daily for involuntary movement   Aricept  10 mg every am for memory loss  Start taking gabapentin 200 mg 3 times daily for anxiety and generalized body pain. Stop taking Gabapentin 100 mg three times daily    RN called pharmacy to clarify script which was completed - no new script required. Routing to [facility] to update script in chart.     Colleen Helms RN on 10/4/2022 at 1:42 PM

## 2022-11-29 ENCOUNTER — MYC REFILL (OUTPATIENT)
Dept: PSYCHIATRY | Facility: CLINIC | Age: 67
End: 2022-11-29

## 2022-11-29 DIAGNOSIS — F06.30 MOOD DISORDER DUE TO OLD HEAD INJURY: ICD-10-CM

## 2022-11-29 DIAGNOSIS — S09.90XS MOOD DISORDER DUE TO OLD HEAD INJURY: ICD-10-CM

## 2022-11-29 RX ORDER — SERTRALINE HYDROCHLORIDE 100 MG/1
150 TABLET, FILM COATED ORAL DAILY
Qty: 45 TABLET | Refills: 2 | Status: SHIPPED | OUTPATIENT
Start: 2022-11-29 | End: 2023-02-22

## 2022-11-29 NOTE — TELEPHONE ENCOUNTER
Reason for call:  Other   Patient called regarding (reason for call): prescription  Additional comments:     Pharmacist called to advised they will make the dosage for :    sertraline (ZOLOFT)     into two separate to get 150mg. She advised there will be one for 100mg and 50mg - this is just an FYI, no callback is needed unless there are questions/concerns    Phone number to reach patient:  Other phone number:  [phone]*    Best Time:  When available    Can we leave a detailed message on this number?  YES    Travel screening: Not Applicable

## 2022-11-29 NOTE — TELEPHONE ENCOUNTER
Date of Last Office Visit: 10/3/22  Date of Next Office Visit: None, will Juvet patient to make appt  No shows since last visit: 0  Cancellations since last visit: 0    Medication requested: sertraline (ZOLOFT) 100 MG tablet Date last ordered: 8/25/22 Qty: 45 Refills: 2     Review of [place] ?: NA  Medication last filled date: 8/25/22 Qty filled: 45    Lapse in medication adherence greater than 5 days?: No  If yes, call patient and gather details: na  Medication refill request verified as identical to current order?: yes  Result of Last DAM, VPA, Li+ Level, CBC, or Carbamazepine Level (at or since last visit): N/A    Last visit treatment plan: 1.Patient will take the medications as prescribed.   Medications: Zoloft 150 mg qam for depression  Risperdal 0.5 mg two times a day for mood   Cogentin 0.5 mg daily for involuntary movement   Aricept  10 mg every am for memory loss  Start taking gabapentin 200 mg 3 times daily for anxiety and generalized body pain. Stop taking Gabapentin 100 mg three times daily    []Medication refilled per  Medication Refill in Ambulatory Care  policy.  [x]Medication unable to be refilled by RN due to criteria not met as indicated below:    []Eligibility - not seen in the last year   [x]Supervision - no future appointment   []Compliance - no shows, cancellations or lapse in therapy   []Verification - order discrepancy   []Controlled medication   [x]Medication not included in policy   [x]90-day supply request   []Other

## 2023-02-20 DIAGNOSIS — F06.30 MOOD DISORDER DUE TO OLD HEAD INJURY: ICD-10-CM

## 2023-02-20 DIAGNOSIS — S09.90XS MOOD DISORDER DUE TO OLD HEAD INJURY: ICD-10-CM

## 2023-02-22 RX ORDER — SERTRALINE HYDROCHLORIDE 100 MG/1
TABLET, FILM COATED ORAL
Qty: 30 TABLET | Refills: 3 | Status: SHIPPED | OUTPATIENT
Start: 2023-02-22 | End: 2023-06-12

## 2023-02-22 NOTE — TELEPHONE ENCOUNTER
[facility], please schedule this patient for a future visit with  DAVID Singleton . Patient is requesting a refill.     Requesting separate prescriptions for a 50&100 mg tabs    Date of Last Office Visit: 10/3/23  Date of Next Office Visit: None. Scheduling attempting to contact pt  No shows since last visit: 0  Cancellations since last visit: 0    Medication requested: sertraline (ZOLOFT) 100 MG tablet  Date last ordered: 11/29/22 Qty: 45 Refills: 2     sertraline (ZOLOFT) 100 MG tablet 45 tablet 2 11/29/2022  No   Sig - Route: Take 1.5 tablets (150 mg) by mouth daily - Oral   Sent to pharmacy as: Sertraline HCl 100 MG Oral Tablet (ZOLOFT)   Class: E-Prescribe         Lapse in medication adherence greater than 5 days?: no  If yes, call patient and gather details: NA  Medication refill request verified as identical to current order?: No, requesting a 50&100 mg tabs instead of 100 mg , take 1.5 tabs a day  Result of Last DAM, VPA, Li+ Level, CBC, or Carbamazepine Level (at or since last visit): N/A    Last visit treatment plan:       Plan:  1.Patient will take the medications as prescribed.   Medications: Zoloft 150 mg qam for depression  Risperdal 0.5 mg two times a day for mood   Cogentin 0.5 mg daily for involuntary movement   Aricept  10 mg every am for memory loss  Start taking gabapentin 200 mg 3 times daily for anxiety and generalized body pain. Stop taking Gabapentin 100 mg three times daily  Patient will not stop taking medications or adjust them without consulting with the provider.  2.Staff will call with any problems between 2 visits.   3.long-term staff will take the patient to the emergency room if not feeling safe  or unable to function in the community, or if suicidal, homicidal or hearing voices or having paranoia.   4. long-term staff will watch his diet and exercise.   5.Patient will see non psychiatric providers for non psychiatric disorders.   6. Next appointment in 2 months  7.  staff will  continue to encourage him to use a walker instead a wheelchair  8. I recommend that his sister becomes his gardian due to worsening cognitive abilities and TBI  9. Next appointment in 2 months       []Medication refilled per  Medication Refill in Ambulatory Care  policy.  [x]Medication unable to be refilled by RN due to criteria not met as indicated below:    []Eligibility - not seen in the last year   [x]Supervision - no future appointment   []Compliance - no shows, cancellations or lapse in therapy   [x]Verification - order discrepancy   []Controlled medication   [x]Medication not included in policy   []90-day supply request   [x]Other: LPN is processing request

## 2023-04-18 ENCOUNTER — VIRTUAL VISIT (OUTPATIENT)
Dept: PSYCHIATRY | Facility: CLINIC | Age: 68
End: 2023-04-18
Payer: COMMERCIAL

## 2023-04-18 DIAGNOSIS — S09.90XS MOOD DISORDER DUE TO OLD HEAD TRAUMA: Primary | ICD-10-CM

## 2023-04-18 DIAGNOSIS — F14.11 COCAINE ABUSE IN REMISSION (H): ICD-10-CM

## 2023-04-18 DIAGNOSIS — S09.90XS DEMENTIA DUE TO HEAD TRAUMA WITHOUT BEHAVIORAL DISTURBANCE (H): ICD-10-CM

## 2023-04-18 DIAGNOSIS — F06.30 MOOD DISORDER DUE TO OLD HEAD TRAUMA: Primary | ICD-10-CM

## 2023-04-18 DIAGNOSIS — F02.80 DEMENTIA DUE TO HEAD TRAUMA WITHOUT BEHAVIORAL DISTURBANCE (H): ICD-10-CM

## 2023-04-18 DIAGNOSIS — F10.21 ALCOHOL DEPENDENCE IN REMISSION (H): ICD-10-CM

## 2023-04-18 DIAGNOSIS — F41.1 GAD (GENERALIZED ANXIETY DISORDER): ICD-10-CM

## 2023-04-18 PROBLEM — F39 MOOD DISORDER (H): Status: ACTIVE | Noted: 2023-04-18

## 2023-04-18 PROCEDURE — 99214 OFFICE O/P EST MOD 30 MIN: CPT | Mod: VID | Performed by: NURSE PRACTITIONER

## 2023-04-18 NOTE — PATIENT INSTRUCTIONS
"Patient Education   [facility]  What to Expect  Here's what to expect in the [facility].   About the program  You'll be meeting with a psychiatric doctor to check your mental health. A psychiatric doctor helps you deal with troubling thoughts and feelings by giving you medicine. They'll make sure you know the plan for your care. You may see them for a long time. When you're feeling better, they may refer you back to seeing your family doctor.   If you have any questions, we'll be glad to talk to you.  About visits  Be open  At your visits, please talk openly about your problems. It may feel hard, but it's the best way for us to help you.  Cancelling visits  If you can't come to your visit, please call us right away at [phone]. If you don't cancel at least 24 hours (1 full day) before your visit, that's \"late cancellation.\"  Not showing up for your visits  Being very late is the same as not showing up. You'll be a \"no show\" if:  You're more than 15 minutes late for a 30-minute (half hour) visit.  You're more than 30 minutes late for a 60-minute (full hour) visit.  If you cancel late or don't show up 2 times within 6 months, we may end your care.  Getting help between visits  If you need help between visits, you can call us Monday to Friday from 8 a.m. to 4:30 p.m. at [phone].  Emergency care  Call 911 or go to the nearest emergency department if your life or someone else's life is in danger.  Call 988 anytime to reach the national Suicide and Crisis hotline.  Medicine refills  To refill your medicine, call your pharmacy. You can also call [facility] at [phone], Monday to Friday, 8 a.m. to 4:30 p.m. It can take 1 to 3 business days to get a refill.   Forms, letters, and tests  You may have papers to fill out, like FMLA, short-term disability, and workability. We can help you with these forms at your visits, but you must have an " appointment. You may need more than 1 visit for this, to be in an intensive therapy program, or both.  Before we can give you medicine for ADHD, we may refer you to get tested for it or confirm it another way.  We may not be able to give you an emotional support animal letter.  We don't do mental health checks ordered by the court.   We don't do mental health testing, but we can refer you to get tested.   Thank you for choosing us for your care.  For informational purposes only. Not to replace the advice of your health care provider. Copyright   2022 [facility]. All rights reserved. [employer] [id number] - 12/22.       Plan:  1.Patient will take the medications as prescribed.   Medications: Zoloft 150 mg qam for depression  Risperdal 0.5 mg two times a day for mood   Cogentin 0.5 mg daily for involuntary movement   Aricept  10 mg every am for memory loss  Continue taking gabapentin 200 mg 3 times daily for anxiety and generalized body pain.   Patient will not stop taking medications or adjust them without consulting with the provider.  2.Staff will call with any problems between 2 visits.   3.intermediate staff will take the patient to the emergency room if not feeling safe  or unable to function in the community, or if suicidal, homicidal or hearing voices or having paranoia.   4. intermediate staff will watch his diet and exercise.   5.Patient will see non psychiatric providers for non psychiatric disorders.   6. Next appointment in 2 months  7.  staff will continue to encourage him to use a walker instead a wheelchair  8. I recommend that his sister becomes his gardian due to worsening cognitive abilities and TBI  9. Next appointment in 3 months

## 2023-04-18 NOTE — PROGRESS NOTES
"PSYCHIATRIC PROGRESS NOTE     Name:  Tavon Arias  : 1955    Tavon Arias is a 67 year old male who is being evaluated via a billable Video visit.      Telemedicine Visit: The patient's condition can be safely assessed and treated via synchronous audio and visual telemedicine encounter.      Reason for Telemedicine Visit: COVID 19 pandemic and the social and physical recommendations by the CDC and MD., Patient has requested telehealth visit and Patient unable to travel      Originating Site (Patient Location): [facility] [place]    Distant Site (Provider Location): [facility] Outpatient Setting: [facility]    Consent:  The patient/guardian has verbally consented to: the potential risks and benefits of telemedicine (video visit or phone) versus in person care; bill my insurance or make self-payment for services provided; and responsibility for payment of non-covered services.     Mode of Communication:  Helium platform     As the provider I attest to compliance with applicable laws and regulations related to telemedicine.     Date of Last Visit: 10/3/23                                        CHIEF COMPLAINT     \"I am doing well\"  HISTORY OF PRESENT ILLNESS     Patient who was last seen in the clinic on 10/3/22 returned today for follow up visit.  Patient who lives in a group home attended visit with the group [place] staff.  Patient reports that there has not been any major changes since last visit, stating he continues to stabilize on current medication regimen.  Patient reports he is doing well, stating both anxiety and depression are currently under control.  Patient also reports mood has been stabilized with no agitation or irritability.  Patient's caregiver reports he still yells once in a while, but stating the frequency has decreased significantly since last visit.  Staff reports patient's behaviors are more manageable.  Patient does complain of headache of which I advised him to follow up " with his primary for the assessment.  Patient currently denies both suicidal or homicidal ideation.  He also denied both auditory and visual hallucination.  Patient reports no nila or hypomania.  Patient to return to clinic in 3 months for follow-up.  PSYCHIATRIC HISTORY:   History of Psychiatric Hospitalizations:   - Inpatient: Yes, most recently in 2006  - IOP/PHP/Day treatment: Denies  History of Suicidal Ideation:Accidental overdose on heroin in 2006 and he had anoxic brain  History of Suicide Attempts: Accidental overdose on heroin in 2006 and he had anoxic brain  History of Self-injurious Behavior: Denies a history of SIB.  Current:  No  History of Violence/Aggression: History of Verbal agression  History of Commitment? Denies  Electroconvulsive Therapy (ECT): Denies    PSYCHIATRIC REVIEW OF SYSTEMS:   Psychiatric Review of Systems:   Depression:   Reports: depressed mood, helplessness, irritability, low tolerance  Nila:   Denies: sleeplessness, increased goal-directed activities, abrupt increase in energy pressured speech  Psychosis:   Denies: visual hallucinations, auditory hallucinations, paranoia  Anxiety:   Reports: excessive worries that are difficult to control  PTSD:   Denies: re-experiencing past trauma, nightmares, increased arousal, avoidance of traumatic stimuli, impaired function.  Denies: re-experiencing past trauma, nightmares, increased arousal, avoidance of traumatic stimuli, impaired function.  OCD:   Denies: obsessions, checking, symmetry, cleaning, skin picking.  Eating Disorder:   Denies: restriction, binging, purging.    Sleep:   Denies: Sleep deprivation    MEDICATIONS                                                                                                Current Outpatient Medications   Medication Sig     Acetaminophen (TYLENOL PO)      albuterol (PROAIR HFA, PROVENTIL HFA, VENTOLIN HFA) 108 (90 BASE) MCG/ACT inhaler Inhale 2 puffs into the lungs every 6 hours     ASPIRIN PO  Take 81 mg by mouth daily      benztropine (COGENTIN) 0.5 MG tablet TAKE 1 TABLET BY MOUTH ONCE DAILY     BENZTROPINE MESYLATE PO Take 0.5 mg by mouth daily     bisacodyl (DULCOLAX) 5 MG EC tablet Take 5 mg by mouth daily as needed for constipation     calcium carbonate-vitamin D (OYSTER SHELL CALCIUM/D) 500-200 MG-UNIT tablet Take 1 tablet by mouth daily     calcium citrate-vitamin D (CITRACAL) 315-250 MG-UNIT TABS Take by mouth daily     donepezil (ARICEPT) 10 MG tablet TAKE 1 TABLET BY MOUTH EVERY MORNING.     gabapentin (NEURONTIN) 100 MG capsule TAKE 2 CAPSULES (200MG) BY MOUTH THREE TIMES DAILY     Ipratropium-Albuterol (COMBIVENT RESPIMAT)  MCG/ACT inhaler Inhale 1 puff into the lungs 4 times daily     ketoconazole (NIZORAL) 2 % cream Apply topically daily     levETIRAcetam (KEPPRA) 100 MG/ML solution Take 10 mg/kg by mouth 2 times daily     LOSARTAN POTASSIUM PO Take 25 mg by mouth daily      magnesium citrate solution Take 296 mLs by mouth once     METFORMIN HCL PO Take 500 mg by mouth 2 times daily (with meals)      multivitamin w/minerals (THERA-VIT-M) tablet Take 1 tablet by mouth daily     OMEPRAZOLE PO Take 20 mg by mouth every morning      oxybutynin (DITROPAN-XL) 5 MG 24 hr tablet Take 5 mg by mouth 2 times daily      Pediatric Multivitamins-Fl (MULTIVITAMIN WITH 1 MG FLUORIDE) 1 MG CHEW Take 1 tablet by mouth daily     polyethylene glycol (MIRALAX/GLYCOLAX) powder Take 1 capful by mouth daily     psyllium (REGULOID) 58.6 % powder Take 6 g by mouth daily     risperiDONE (RISPERDAL) 0.5 MG tablet TAKE 1 TABLET BY MOUTH TWICE DAILY.     senna-docusate (SENOKOT-S;PERICOLACE) 8.6-50 MG per tablet Take 1 tablet by mouth daily     sertraline (ZOLOFT) 100 MG tablet TAKE 1 TABLET BY MOUTH ONCE DAILY ALONG WITH 50MG FOR TOTAL DOSE = 150MG     sertraline (ZOLOFT) 50 MG tablet TAKE 1 TABLET BY MOUTH ONCE DAILY ALONG WITH 100MG FOR TOTAL DOSE = 150MG     TRAMADOL HCL PO      triamcinolone (KENALOG) 0.025 %  "cream Apply topically 2 times daily     No current facility-administered medications for this visit.       DRUG MONITORING:  [place] Prescription Monitoring Program evaluating controlled substances in the last year in [place]:  The [place] Prescription Monitoring Program has been reviewed and there are no current concerns with: diversionary activity, early refill requests, and or obtaining the medication from multiple providers      PAST PSYCHOTROPIC MEDICATIONS:  Keppra, Trazodone, citalopam    VITALS   There were no vitals taken for this visit.     BP Readings from Last 1 Encounters:   10/15/19 116/73     Pulse Readings from Last 1 Encounters:   10/15/19 59     Wt Readings from Last 1 Encounters:   03/20/19 83.9 kg (185 lb)     Ht Readings from Last 1 Encounters:   10/15/19 1.778 m (5' 10\")     Estimated body mass index is 26.54 kg/m  as calculated from the following:    Height as of 10/15/19: 1.778 m (5' 10\").    Weight as of 3/20/19: 83.9 kg (185 lb).      PERTINENT HISTORY   PAST MEDICAL HISTORY:   Past Medical History:   Diagnosis Date     Anoxic brain injury (H)      Atrial fibrillation (H)      Cardiac arrest (H)      Cocaine abuse (H)      Cocaine abuse, in remission (H)     Created by Conversion      COPD (chronic obstructive pulmonary disease) (H)      Hepatitis A      Hypertension      Mood disorder (H)      Opioid abuse, in remission (H)     Created by Conversion [facility] Annotation: Aug 11 2008  9:08AM - Trung Harden: HEROIN      Other and unspecified alcohol dependence, in remission     Created by Conversion      Type 2 diabetes mellitus without complications (H)        PAST SURGICAL HISTORY:   Past Surgical History:   Procedure Laterality Date     CHOLECYSTECTOMY       COLONOSCOPY  4/22/2014    Procedure: COLONOSCOPY;  Surgeon: Familia Bain MD;  Location:  GI     COLONOSCOPY N/A 3/29/2017    Procedure: COMBINED COLONOSCOPY, SINGLE OR MULTIPLE BIOPSY/POLYPECTOMY BY BIOPSY;  Surgeon: " Cassie Martin MD;  Location:  GI     colostomy and takedown       ZZC PART REMOVAL COLON W END COLOSTOMY      Description: Partial Colectomy, End Colostomy & Distal Segment Closure;  Proc Date: 2003;  Comments: SIGMOID RESECTION AND CROSS POUCH FOR PERF DIVERTICULITIS       FAMILY HISTORY: No family history on file.    SOCIAL HISTORY:   Social History     Tobacco Use     Smoking status: Former     Packs/day: 1.00     Types: Cigarettes     Quit date: 10/13/2014     Years since quittin.5     Smokeless tobacco: Former     Tobacco comments:     stopped smoking per staff   Vaping Use     Vaping status: Not on file   Substance Use Topics     Alcohol use: No         Seizures or Head Injury: Reports history of seizure and TBI  History of cardiac disease, rheumatic fever, fainting or dizziness, especially with exercise, seizures, chest pain or shortness of breath with exercise, unexplained change in exercise tolerance, palpitations, high blood pressure, or heart murmur?   No    LABS & IMAGING                                                                                                                  No lab results found.  No lab results found.  Recent Labs   Lab Test 16  0925   CHOL 139   LDL 80   HDL 37*   TRIG 111   A1C 5.9     No lab results found.  No results found for: GKL384, NCEN001, DLFJ03BLBHX, VITD3, D2VIT, D3VIT, DTOT, [id number], [id number], [id number], [id number], [id number], [id number]     ALLERGY & IMMUNIZATIONS       Allergies   Allergen Reactions     Adhesive Tape Swelling     Latex        FAMILY MEDICAL HISTORY:     Family History     No data available            Family history of sudden or unexplained death or an event requiring resuscitation in children or young adults, cardiac arrhythmias (eg, Abbi-Parkinson-White syndrome), long QT syndrome, catecholaminergic paroxysmal ventricular tachycardia, Brugada syndrome, arrhythmogenic right ventricular dysplasia, hypertrophic  cardiomyopathy, dilated cardiomyopathy, or Marfan syndrome?  No    FAMILY PSYCHIATRIC HISTORY:   Psychiatric: Questionable  Suicide attempt: Questionable  Chemical dependency: Positive  Diabetes: Positive    SIGNIFICANT SOCIAL/FAMILY HISTORY:                                           No abuse.    His parents  when he was 20 years old.    He was  and .    He has 2 children.  No contact.    He is on SSDI.  He lives in a group home.    He has conservator for financial decisions    LEGAL: Denies     SUBSTANCE USE HISTORY    Chemical dependency history: Patient had 6 chemical dependency treatments more than 10 years ago  Hx of polysubstance abuse - heroine, cocaine, Alcohol    MEDICAL REVIEW OF SYSTEMS:   Ten system review was completed with pertinent positives noted above    MENTAL STATUS EXAM:   Mental Status Examination (limited due to video virtual visit format):  General:  cooperative  Orientation: Oriented to self, completely disoriented in time and its chronic, partially oriented in place  Speech: Impaired articulation due to TBI  Language: normal naming  Thought process: Starr  Thought content: Denies hallucinations and delusions   Suicidal thoughts: Denies  Homicidal thoughts: Denies  Associations: connected   Affect: irritable due to headache   Mood: depressed   Intellectual functioning: Decreased due to TBI  Memory: Decreased due to TBI   Fund of knowledge: Decreased due to TBI   Attention and concentration: Seems to be at baseline  Gait: I cannot assess it because this is phone visit, but the staff says that he uses walker at home  Psychomotor activity: Mild agitation in his voice  Muscles: I cannot assess it because this is phone visit  InSight and judgment: Limited       SAFETY   Feels safe in home: Yes   Suicidal ideation: Denies  History of suicide attempts:  Yes   Hx of impulsivity: No     DSM 5 DIAGNOSIS:   1. 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 Alcohol abuse, in remission    5. Cocaine abuse in remission (H)    6. KERA (generalized anxiety disorder)      ASSESSMENT AND PLAN    Patient is a 67 year old,  White Not  or  male with a history of 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 Patient reports symptoms stabilization on current medication regimen.  Both anxiety and depression are under control.  Mood remained stabilized with occasional yelling that is manageable according to her caregiver.  He complains of headache of which encouraged him to follow up with his primary provider for further assessment.  Patient denies SI, SIB, and HI.  He also denied auditory or visual hallucination.  He reported no yogi or hypomania.  Return to clinic in 3 months for follow-up.    Plan:  1.Patient will take the medications as prescribed.   Medications: Zoloft 150 mg qam for depression  Risperdal 0.5 mg two times a day for mood   Cogentin 0.5 mg daily for involuntary movement   Aricept  10 mg every am for memory loss  Continue taking gabapentin 200 mg 3 times daily for anxiety and generalized body pain.   Patient will not stop taking medications or adjust them without consulting with the provider.  2.Staff will call with any problems between 2 visits.   3.correction staff will take the patient to the emergency room if not feeling safe  or unable to function in the community, or if suicidal, homicidal or hearing voices or having paranoia.   4. correction staff will watch his diet and exercise.   5.Patient will see non psychiatric providers for non psychiatric disorders.   6. Next appointment in 2 months  7.  staff will continue to encourage him to use a walker instead a wheelchair  8. I  recommend that his sister becomes his gardian due to worsening cognitive abilities and TBI  9. Next appointment in 3 months       CONSULTS/REFERRALS:   Continue therapy  None at this time  Coordinate care with therapist as needed    MEDICAL:   None at this time  Coordinate care with PCP (Trung Harden) as needed  Follow up with primary care provider as planned or for acute medical concerns.    PSYCHOEDUCATION:  Medication side effects and alternatives reviewed. Health promotion activities recommended and reviewed today. All questions addressed. Education and counseling completed regarding risks and benefits of medications and psychotherapy options.  Consent provided by patient/guardian  Call the psychiatric nurse line with medication questions or concerns at [phone].  CTQuanhart may be used to communicate with your provider, but this is not intended to be used for emergencies.  SEROTONIN SYNDROME:  Discussed risks of Serotonin syndrome (ie, serotonin toxicity) which is a potentially life-threatening condition associated with increased serotonergic activity in the central nervous system (CNS). It is seen with therapeutic medication use, inadvertent interactions between drugs, and intentional self-poisoning. Serotonin syndrome may involve a spectrum of clinical findings, which often include mental status changes, autonomic hyperactivity, and neuromuscular abnormalities.    FIRST GENERATION ANTIPSYCHOTIC/ SECOND GENERATION ANTIPSYCHOTIC USE:  Atypical need for cardiometabolic monitoring with medication- B/P, weight, blood sugar, cholesterol.  Need to monitor for abnormal movements taught  SLEEP HYGIENE: establish a sleep routine, limit screen time 1 hour prior to bed, use bed for sleep only, take sleep/medications on time (including sleepy time tea, trazadone or herbal treatments such as melatonin), aroma therapy, limit caffeine/sugar, yoga, guided imagery, stretch, meditation, limit naps to 20 minutes, make a  temperature change in the room, white noise, be mindful of slowing down breathing, take a warm bath/shower, frequently wash sheets, and journaling.   Medlineplus.gov is information for patients.  It is run by the National Library of Medicine and it contains information about all disorders, diseases and all medications.      COMMUNITY RESOURCES:    CRISIS NUMBERS: Provided in AVS 2023  National Suicide Prevention Lifeline: 2-721-120-TALK (068-206-2139)  Chamate/resources for a list of additional resources (SOS)            [facility] - [phone]   Urgent Care Adult Mental Xbxzbb-[phone] mobile unit/  crisis line  [facility] -[phone]   COPE  [facility] Mobile Team -[phone] (adults)/ [phone] (child)  Poison Control Center - 1-873.860.6325    OR  go to nearest ER  Crisis Text Line for any crisis  send this-   To: 255994   [facility]  [phone]  National Suicide Prevention Lifeline: 701.919.8466 (TTY: 951.907.6114). Call anytime for help.  (www.suicidepreventionlifeline.org)  National [place] on Mental Illness (www.bell.org): 293-725-6037 or 980-074-9054.   Mental Health Association (www.mentalhealth.org): 100.206.9560 or 074-856-6624.  [place] Crisis Text Line: Text [place] to 727560  Suicide LifeLine Chat: suicideURBANARA.org/chat    ADMINISTRATIVE BILLIN min spent interviewing patient, reviewing referral documents, obtaining and reviewing outside records, communication with other health specialists, and preparing this report this day 4/18/23    Video/Phone Start Time: 9:38 am  Video/Phone End Time:  9:49 am    Greater than 50% of time was spent in counseling and coordination of care regarding above diagnoses and treatment plan.    Patient Status:  Our psychiatry providers act as a specialty service for Primary Care Providers in the [facility] that seek to optimize medications for  unstable patients.  Once medications have been optimized, our providers discharge the patient back to the referring Primary Care Provider for ongoing medication management.  This type of system allows our providers to serve a high volume of patients. At this time  Patient will continue to be seen for ongoing consultation and stabilization.    Signed:   Marcia Singleton, MSN, APRN, PMHNP-BC  Long Term Outpatient Psychiatry  Chart documentation done in part with Dragon Voice Recognition software.  Although reviewed after completion, some word and grammatical errors may remain.

## 2023-04-18 NOTE — NURSING NOTE
Is the patient currently in the state of [place]? YES    Visit mode:VIDEO    If the visit is dropped, the patient can be reconnected by: VIDEO VISIT:  Send e-mail to at [email or web address]    Will anyone else be joining the visit? No  (If patient encounters technical issues they should call [phone])    How would you like to obtain your AVS? Declined    Are changes needed to the allergy or medication list? NO    Rooming Documentation: Unable to complete qnrs due to time    Reason for visit: Video Visit       ABHIJIT Martin

## 2023-05-18 ENCOUNTER — TELEPHONE (OUTPATIENT)
Dept: PSYCHIATRY | Facility: CLINIC | Age: 68
End: 2023-05-18

## 2023-05-18 NOTE — LETTER
5/18/2023        RE: Tavon Arias  [address]  [facility] [address]        Patient who is currently under my car was seen by me on 10/3/2022 for an intake appointment and also on 4/18/2023 for a follow up appointment      Sincerely,        MARTÍNEZ Wright CNP

## 2023-05-18 NOTE — TELEPHONE ENCOUNTER
Reason for call:  Other   Patient called regarding (reason for call): pt nurse at [facility] requesting after visist summaries.    Additional comments: Nurse would like a copy of after visist summary for:    4.18.2023  10.3.2022    Faxed to [phone] [facility]OSIO    Phone number to reach patient:  [phone]    Best Time:  ANY    Can we leave a detailed message on this number?  YES    Travel screening: Not Applicable

## 2023-05-19 NOTE — TELEPHONE ENCOUNTER
Spoke to Cassie Garay at [facility]. I told her we did not have an DAVID for her or facility. She said all she needs is a note by Yobani saying patient was seen by him on 4/18/23 and 10/3/22. She doesn't need any other information. They are just trying to close those charts and needs proof that he was seen.   Yobani did see patient on those days.    She needs it faxed to Attn: Cassie Garay (put last name on cover sheet) at [phone]    I did tell nurse that it would be beneficial for them to get an DAVID in his file so it would be easier to share information. I explained to go to the website and download and fax it back to [place]. She agrees.     Louise Pollock RN on 5/19/2023 at 9:08 AM

## 2023-05-19 NOTE — TELEPHONE ENCOUNTER
I have written a note/letter stating patient was currently under my care and was seen by me on 10/3/22 and 4/18/2023. Letter is ready to be faxed to patient's facility.    Thanks,  Yobani MONGE CNP

## 2023-06-12 DIAGNOSIS — F06.30 MOOD DISORDER DUE TO OLD HEAD INJURY: ICD-10-CM

## 2023-06-12 DIAGNOSIS — S09.90XS MOOD DISORDER DUE TO OLD HEAD INJURY: ICD-10-CM

## 2023-06-12 RX ORDER — SERTRALINE HYDROCHLORIDE 100 MG/1
TABLET, FILM COATED ORAL
Qty: 31 TABLET | Refills: 3 | Status: SHIPPED | OUTPATIENT
Start: 2023-06-12 | End: 2023-10-18

## 2023-06-12 NOTE — TELEPHONE ENCOUNTER
Date of Last Office Visit: 4/18/23  Date of Next Office Visit: 7/18/23  No shows since last visit: 0  Cancellations since last visit: 0    Medication requested: sertraline (ZOLOFT) 100 MG tablet Date last ordered: 2/22/23 Qty: 30 Refills: 3    Medication requested: sertraline (ZOLOFT) 50 MG tablet Date last ordered: 2/22/23 Qty: 30 Refills: 3     Review of [place] ?: NA    Lapse in medication adherence greater than 5 days?: No  If yes, call patient and gather details: na  Medication refill request verified as identical to current order?: yes  Result of Last DAM, VPA, Li+ Level, CBC, or Carbamazepine Level (at or since last visit): N/A    Last visit treatment plan: Medications: Zoloft 150 mg qam for depression  Risperdal 0.5 mg two times a day for mood   Cogentin 0.5 mg daily for involuntary movement   Aricept  10 mg every am for memory loss  Continue taking gabapentin 200 mg 3 times daily for anxiety and generalized body pain.     [x]Medication refilled per  Medication Refill in Ambulatory Care  policy.  []Medication unable to be refilled by RN due to criteria not met as indicated below:    []Eligibility - not seen in the last year   []Supervision - no future appointment   []Compliance - no shows, cancellations or lapse in therapy   []Verification - order discrepancy   []Controlled medication   []Medication not included in policy   []90-day supply request   []Other

## 2023-07-18 ENCOUNTER — VIRTUAL VISIT (OUTPATIENT)
Dept: PSYCHIATRY | Facility: CLINIC | Age: 68
End: 2023-07-18
Payer: COMMERCIAL

## 2023-07-18 DIAGNOSIS — F39 MOOD DISORDER (H): Primary | ICD-10-CM

## 2023-07-18 DIAGNOSIS — S09.90XS MOOD DISORDER DUE TO OLD HEAD TRAUMA: ICD-10-CM

## 2023-07-18 DIAGNOSIS — F02.80 DEMENTIA DUE TO HEAD TRAUMA WITHOUT BEHAVIORAL DISTURBANCE (H): ICD-10-CM

## 2023-07-18 DIAGNOSIS — F06.30 MOOD DISORDER DUE TO OLD HEAD TRAUMA: ICD-10-CM

## 2023-07-18 DIAGNOSIS — S09.90XS DEMENTIA DUE TO HEAD TRAUMA WITHOUT BEHAVIORAL DISTURBANCE (H): ICD-10-CM

## 2023-07-18 DIAGNOSIS — F14.11 COCAINE ABUSE IN REMISSION (H): ICD-10-CM

## 2023-07-18 DIAGNOSIS — F41.1 GAD (GENERALIZED ANXIETY DISORDER): ICD-10-CM

## 2023-07-18 DIAGNOSIS — F11.11 OPIOID ABUSE, IN REMISSION (H): ICD-10-CM

## 2023-07-18 PROCEDURE — 99212 OFFICE O/P EST SF 10 MIN: CPT | Mod: VID | Performed by: NURSE PRACTITIONER

## 2023-07-18 NOTE — PROGRESS NOTES
Virtual Visit Details    Type of service:  Video Visit   Video Start Time: 11:36 am  Video End Time:11:45 am    Originating Location (pt. Location): Other Living facility.    Distant Location (provider location):  Off-site  Platform used for Video Visit: Adrian

## 2023-07-18 NOTE — NURSING NOTE
Is the patient currently in the state of [place]? YES - at living facility.    Visit mode:VIDEO    If the visit is dropped, the patient can be reconnected by: VIDEO VISIT: Text to cell phone:   Cell phone: [phone]       Will anyone else be joining the visit? Yes: How would they like to receive their invite Text to cell phone: [phone]  (If patient encounters technical issues they should call [phone])    How would you like to obtain your AVS? Mail a copy    Are changes needed to the allergy or medication list? NO    Rooming Documentation: Attendance Guidelines - Care team has reviewed attendance agreement with patient. Patient advised that two failed appointments within 6 months may lead to termination of current episode of care.      Unable to complete qnrs due to time.     Medical coordinator will be joining visit with pt.     Reason for visit: MARTIN Galdamez, VVF

## 2023-07-18 NOTE — PATIENT INSTRUCTIONS
"Patient Education   The [facility]  What to Expect  Here's what to expect in the Panel Psychiatry Program.   About the program  You'll be meeting with a psychiatric doctor to check your mental health. A psychiatric doctor helps you deal with troubling thoughts and feelings by giving you medicine. They'll make sure you know the plan for your care. You may see them for a long time. When you're feeling better, they may refer you back to seeing your family doctor.   If you have any questions, we'll be glad to talk to you.  About visits  Be open  At your visits, please talk openly about your problems. It may feel hard, but it's the best way for us to help you.  Cancelling visits  If you can't come to your visit, please call us right away at [phone]. If you don't cancel at least 24 hours (1 full day) before your visit, that's \"late cancellation.\"  Not showing up for your visits  Being very late is the same as not showing up. You'll be a \"no show\" if:  You're more than 15 minutes late for a 30-minute (half hour) visit.  You're more than 30 minutes late for a 60-minute (full hour) visit.  If you cancel late or don't show up 2 times within 6 months, we may end your care.  Getting help between visits  If you need help between visits, you can call us Monday to Friday from 8 a.m. to 4:30 p.m. at [phone].  Emergency care  Call 911 or go to the nearest emergency department if your life or someone else's life is in danger.  Call 988 anytime to reach the national Suicide and Crisis hotline.  Medicine refills  To refill your medicine, call your pharmacy. You can also call [facility]'s [facility] at [phone], Monday to Friday, 8 a.m. to 4:30 p.m. It can take 1 to 3 business days to get a refill.   Forms, letters, and tests  You may have papers to fill out, like FMLA, short-term disability, and workability. We can help you with these forms at your visits, but you must have an " appointment. You may need more than 1 visit for this, to be in an intensive therapy program, or both.  Before we can give you medicine for ADHD, we may refer you to get tested for it or confirm it another way.  We may not be able to give you an emotional support animal letter.  We don't do mental health checks ordered by the court.   We don't do mental health testing, but we can refer you to get tested.   Thank you for choosing us for your care.  For informational purposes only. Not to replace the advice of your health care provider. Copyright   2022 [facility]. All rights reserved. [employer] 427155 - 12/22.       Plan:  1.Patient will take the medications as prescribed.   Medications: Zoloft 150 mg qam for depression  Risperdal 0.5 mg two times a day for mood   Cogentin 0.5 mg daily for involuntary movement   Aricept  10 mg every am for memory loss  Continue taking gabapentin 200 mg 3 times daily for anxiety and generalized body pain.   Patient will not stop taking medications or adjust them without consulting with the provider.  2.Staff will call with any problems between 2 visits.   3.[facility] staff will take the patient to the emergency room if not feeling safe  or unable to function in the community, or if suicidal, homicidal or hearing voices or having paranoia.   4. [facility] staff will watch his diet and exercise.   5.Patient will see non psychiatric providers for non psychiatric disorders.   6. Next appointment in 2 months  7.  staff will continue to encourage him to use a walker instead a wheelchair  8. I recommend that his sister becomes his gardian due to worsening cognitive abilities and TBI  9. Next appointment in 3 months

## 2023-07-18 NOTE — PROGRESS NOTES
"PSYCHIATRIC PROGRESS NOTE     Name:  Tavon Arias  : 1955    Tavon Arias is a 67 year old male who is being evaluated via a billable Video visit.      Telemedicine Visit: The patient's condition can be safely assessed and treated via synchronous audio and visual telemedicine encounter.      Reason for Telemedicine Visit: COVID 19 pandemic and the social and physical recommendations by the CDC and MD., Patient has requested telehealth visit and Patient unable to travel      Originating Site (Patient Location): [facility]    Distant Site (Provider Location): [facility] Outpatient Setting: [facility]    Consent:  The patient/guardian has verbally consented to: the potential risks and benefits of telemedicine (video visit or phone) versus in person care; bill my insurance or make self-payment for services provided; and responsibility for payment of non-covered services.     Mode of Communication:  [employer] platform     As the provider I attest to compliance with applicable laws and regulations related to telemedicine.     Date of Last Visit: 23                                        CHIEF COMPLAINT     \"I am doing well\"  HISTORY OF PRESENT ILLNESS     Patient who was last seen in the clinic on 22 returned today for follow up visit.  Patient who lives in a group home attended visit with the [facility] staff.  Patient reports that there has not been any major changes since last visit, stating he continues to stabilize on current medication regimen.  Patient reports he is doing well, stating both anxiety and depression are currently under control.  Patient also reports mood has been stabilized with no agitation or irritability.  Patient's caregiver reports patient has not exhibited any agitation or intermittently yelling since last visit.  Staff stated patient is generally stabilized and redirectable.  Patient currently denies both suicidal or homicidal ideation.  He also denied both " auditory and visual hallucination.  Patient reports no nila or hypomania.  Patient to return to clinic in 3 months for follow-up.  PSYCHIATRIC HISTORY:   History of Psychiatric Hospitalizations:   - Inpatient: Yes, most recently in 2006  - IOP/PHP/Day treatment: Denies  History of Suicidal Ideation:Accidental overdose on heroin in 2006 and he had anoxic brain  History of Suicide Attempts: Accidental overdose on heroin in 2006 and he had anoxic brain  History of Self-injurious Behavior: Denies a history of SIB.  Current:  No  History of Violence/Aggression: History of Verbal agression  History of Commitment? Denies  Electroconvulsive Therapy (ECT): Denies    PSYCHIATRIC REVIEW OF SYSTEMS:   Psychiatric Review of Systems:   Depression:   Reports: depressed mood, helplessness, irritability, low tolerance  Nila:   Denies: sleeplessness, increased goal-directed activities, abrupt increase in energy pressured speech  Psychosis:   Denies: visual hallucinations, auditory hallucinations, paranoia  Anxiety:   Reports: excessive worries that are difficult to control  PTSD:   Denies: re-experiencing past trauma, nightmares, increased arousal, avoidance of traumatic stimuli, impaired function.  Denies: re-experiencing past trauma, nightmares, increased arousal, avoidance of traumatic stimuli, impaired function.  OCD:   Denies: obsessions, checking, symmetry, cleaning, skin picking.  Eating Disorder:   Denies: restriction, binging, purging.    Sleep:   Denies: Sleep deprivation    MEDICATIONS                                                                                                Current Outpatient Medications   Medication Sig     Acetaminophen (TYLENOL PO)      albuterol (PROAIR HFA, PROVENTIL HFA, VENTOLIN HFA) 108 (90 BASE) MCG/ACT inhaler Inhale 2 puffs into the lungs every 6 hours     ASPIRIN PO Take 81 mg by mouth daily      benztropine (COGENTIN) 0.5 MG tablet TAKE 1 TABLET BY MOUTH ONCE DAILY     BENZTROPINE  MESYLATE PO Take 0.5 mg by mouth daily     bisacodyl (DULCOLAX) 5 MG EC tablet Take 5 mg by mouth daily as needed for constipation     calcium carbonate-vitamin D (OYSTER SHELL CALCIUM/D) 500-200 MG-UNIT tablet Take 1 tablet by mouth daily     calcium citrate-vitamin D (CITRACAL) 315-250 MG-UNIT TABS Take by mouth daily     donepezil (ARICEPT) 10 MG tablet TAKE 1 TABLET BY MOUTH EVERY MORNING.     gabapentin (NEURONTIN) 100 MG capsule TAKE 2 CAPSULES (200MG) BY MOUTH THREE TIMES DAILY     Ipratropium-Albuterol (COMBIVENT RESPIMAT)  MCG/ACT inhaler Inhale 1 puff into the lungs 4 times daily     ketoconazole (NIZORAL) 2 % cream Apply topically daily     levETIRAcetam (KEPPRA) 100 MG/ML solution Take 10 mg/kg by mouth 2 times daily     LOSARTAN POTASSIUM PO Take 25 mg by mouth daily      magnesium citrate solution Take 296 mLs by mouth once     METFORMIN HCL PO Take 500 mg by mouth 2 times daily (with meals)      multivitamin w/minerals (THERA-VIT-M) tablet Take 1 tablet by mouth daily     OMEPRAZOLE PO Take 20 mg by mouth every morning      oxybutynin (DITROPAN-XL) 5 MG 24 hr tablet Take 5 mg by mouth 2 times daily      Pediatric Multivitamins-Fl (MULTIVITAMIN WITH 1 MG FLUORIDE) 1 MG CHEW Take 1 tablet by mouth daily     polyethylene glycol (MIRALAX/GLYCOLAX) powder Take 1 capful by mouth daily     psyllium (REGULOID) 58.6 % powder Take 6 g by mouth daily     risperiDONE (RISPERDAL) 0.5 MG tablet TAKE 1 TABLET BY MOUTH TWICE DAILY.     senna-docusate (SENOKOT-S;PERICOLACE) 8.6-50 MG per tablet Take 1 tablet by mouth daily     sertraline (ZOLOFT) 100 MG tablet TAKE 1 TABLET BY MOUTH ONCE DAILY ALONG WITH 50MG FOR TOTAL DOSE = 150MG     sertraline (ZOLOFT) 50 MG tablet TAKE 1 TABLET BY MOUTH ONCE DAILY ALONG WITH 100MG FOR TOTAL DOSE = 150MG     TRAMADOL HCL PO      triamcinolone (KENALOG) 0.025 % cream Apply topically 2 times daily     No current facility-administered medications for this visit.       DRUG  "MONITORING:  [place] Prescription Monitoring Program evaluating controlled substances in the last year in [place]:  The [place] Prescription Monitoring Program has been reviewed and there are no current concerns with: diversionary activity, early refill requests, and or obtaining the medication from multiple providers      PAST PSYCHOTROPIC MEDICATIONS:  Keppra, Trazodone, citalopam    VITALS   There were no vitals taken for this visit.     BP Readings from Last 1 Encounters:   10/15/19 116/73     Pulse Readings from Last 1 Encounters:   10/15/19 59     Wt Readings from Last 1 Encounters:   03/20/19 83.9 kg (185 lb)     Ht Readings from Last 1 Encounters:   10/15/19 1.778 m (5' 10\")     Estimated body mass index is 26.54 kg/m  as calculated from the following:    Height as of 10/15/19: 1.778 m (5' 10\").    Weight as of 3/20/19: 83.9 kg (185 lb).      PERTINENT HISTORY   PAST MEDICAL HISTORY:   Past Medical History:   Diagnosis Date     Anoxic brain injury (H)      Atrial fibrillation (H)      Cardiac arrest (H)      Cocaine abuse (H)      Cocaine abuse, in remission (H)     Created by Conversion      COPD (chronic obstructive pulmonary disease) (H)      Hepatitis A      Hypertension      Mood disorder (H)      Opioid abuse, in remission (H)     Created by Conversion [facility] Annotation: Aug 11 2008  9:08AM - Trung Harden: HEROIN      Other and unspecified alcohol dependence, in remission     Created by Conversion      Type 2 diabetes mellitus without complications (H)        PAST SURGICAL HISTORY:   Past Surgical History:   Procedure Laterality Date     CHOLECYSTECTOMY       COLONOSCOPY  4/22/2014    Procedure: COLONOSCOPY;  Surgeon: Familia Bain MD;  Location:  GI     COLONOSCOPY N/A 3/29/2017    Procedure: COMBINED COLONOSCOPY, SINGLE OR MULTIPLE BIOPSY/POLYPECTOMY BY BIOPSY;  Surgeon: Cassie Martin MD;  Location:  GI     colostomy and takedown       ZZC PART REMOVAL COLON W END COLOSTOMY   "    Description: Partial Colectomy, End Colostomy & Distal Segment Closure;  Proc Date: 2003;  Comments: SIGMOID RESECTION AND CROSS POUCH FOR PERF DIVERTICULITIS       FAMILY HISTORY: No family history on file.    SOCIAL HISTORY:   Social History     Tobacco Use     Smoking status: Former     Packs/day: 1.00     Types: Cigarettes     Quit date: 10/13/2014     Years since quittin.7     Smokeless tobacco: Former     Tobacco comments:     stopped smoking per staff   Substance Use Topics     Alcohol use: No         Seizures or Head Injury: Reports history of seizure and TBI  History of cardiac disease, rheumatic fever, fainting or dizziness, especially with exercise, seizures, chest pain or shortness of breath with exercise, unexplained change in exercise tolerance, palpitations, high blood pressure, or heart murmur?   No    LABS & IMAGING                                                                                                                  No lab results found.  No lab results found.  Recent Labs   Lab Test 16  0925   CHOL 139   LDL 80   HDL 37*   TRIG 111   A1C 5.9     No lab results found.  No results found for: HLV616, UUIX346, RHAD05ULPDC, VITD3, D2VIT, D3VIT, DTOT, [id number], [id number], [id number], [id number], [id number], [id number]     ALLERGY & IMMUNIZATIONS       Allergies   Allergen Reactions     Adhesive Tape Swelling     Latex        FAMILY MEDICAL HISTORY:     Family History     No data available            Family history of sudden or unexplained death or an event requiring resuscitation in children or young adults, cardiac arrhythmias (eg, Abbi-Parkinson-White syndrome), long QT syndrome, catecholaminergic paroxysmal ventricular tachycardia, Brugada syndrome, arrhythmogenic right ventricular dysplasia, hypertrophic cardiomyopathy, dilated cardiomyopathy, or Marfan syndrome?  No    FAMILY PSYCHIATRIC HISTORY:   Psychiatric: Questionable  Suicide attempt: Questionable  Chemical  dependency: Positive  Diabetes: Positive    SIGNIFICANT SOCIAL/FAMILY HISTORY:                                           No abuse.    His parents  when he was 20 years old.    He was  and .    He has 2 children.  No contact.    He is on SSDI.  He lives in a group home.    He has conservator for financial decisions    LEGAL: Denies     SUBSTANCE USE HISTORY    Chemical dependency history: Patient had 6 chemical dependency treatments more than 10 years ago  Hx of polysubstance abuse - heroine, cocaine, Alcohol    MEDICAL REVIEW OF SYSTEMS:   Ten system review was completed with pertinent positives noted above    MENTAL STATUS EXAM:   Mental Status Examination (limited due to video virtual visit format):  General:  cooperative  Orientation: Oriented to self, completely disoriented in time and its chronic, partially oriented in place  Speech: Impaired articulation due to TBI  Language: normal naming  Thought process: Foster  Thought content: Denies hallucinations and delusions   Suicidal thoughts: Denies  Homicidal thoughts: Denies  Associations: connected   Affect: irritable due to headache   Mood: depressed   Intellectual functioning: Decreased due to TBI  Memory: Decreased due to TBI   Fund of knowledge: Decreased due to TBI   Attention and concentration: Seems to be at baseline  Gait: I cannot assess it because this is phone visit, but the staff says that he uses walker at home  Psychomotor activity: Mild agitation in his voice  Muscles: I cannot assess it because this is phone visit  InSight and judgment: Limited       SAFETY   Feels safe in home: Yes   Suicidal ideation: Denies  History of suicide attempts:  Yes   Hx of impulsivity: No     DSM 5 DIAGNOSIS:   1. 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 Alcohol abuse, in remission    5. Cocaine abuse in remission (H)    6. KERA  (generalized anxiety disorder)      ASSESSMENT AND PLAN    Patient is a 67 year old,  White Not  or  male with a history of 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 Patient reports symptoms stabilization on current medication regimen.  He has not exhibited agitation or irritability since last visit.  Patient denies SI, SIB, and HI.  He also denied auditory or visual hallucination.  He reported no yogi or hypomania. The patient, staff and I reviewed the diagnosis and treatment plan. Reviewed risks/benefits of medication with patient. Ongoing education given regarding diagnostic and treatment options with adequate verbalization of understanding. RTC in 3 months    Patient agrees with following recommendations:     Plan:  1.Patient will take the medications as prescribed.   Medications: Zoloft 150 mg qam for depression  Risperdal 0.5 mg two times a day for mood   Cogentin 0.5 mg daily for involuntary movement   Aricept  10 mg every am for memory loss  Continue taking gabapentin 200 mg 3 times daily for anxiety and generalized body pain.   Patient will not stop taking medications or adjust them without consulting with the provider.  2.Staff will call with any problems between 2 visits.   3.long term staff will take the patient to the emergency room if not feeling safe  or unable to function in the community, or if suicidal, homicidal or hearing voices or having paranoia.   4. long term staff will watch his diet and exercise.   5.Patient will see non psychiatric providers for non psychiatric disorders.   6. Next appointment in 2 months  7.  staff will continue to encourage him to use a walker instead a wheelchair  8. I recommend that his sister becomes his gardian due to worsening cognitive abilities and TBI  9. Next  appointment in 3 months       CONSULTS/REFERRALS:   Continue therapy  None at this time  Coordinate care with therapist as needed    MEDICAL:   None at this time  Coordinate care with PCP (Trung Harden) as needed  Follow up with primary care provider as planned or for acute medical concerns.    PSYCHOEDUCATION:  Medication side effects and alternatives reviewed. Health promotion activities recommended and reviewed today. All questions addressed. Education and counseling completed regarding risks and benefits of medications and psychotherapy options.  Consent provided by patient/guardian  Call the psychiatric nurse line with medication questions or concerns at [phone].  PSC Info [employer] may be used to communicate with your provider, but this is not intended to be used for emergencies.  SEROTONIN SYNDROME:  Discussed risks of Serotonin syndrome (ie, serotonin toxicity) which is a potentially life-threatening condition associated with increased serotonergic activity in the central nervous system (CNS). It is seen with therapeutic medication use, inadvertent interactions between drugs, and intentional self-poisoning. Serotonin syndrome may involve a spectrum of clinical findings, which often include mental status changes, autonomic hyperactivity, and neuromuscular abnormalities.    FIRST GENERATION ANTIPSYCHOTIC/ SECOND GENERATION ANTIPSYCHOTIC USE:  Atypical need for cardiometabolic monitoring with medication- B/P, weight, blood sugar, cholesterol.  Need to monitor for abnormal movements taught  SLEEP HYGIENE: establish a sleep routine, limit screen time 1 hour prior to bed, use bed for sleep only, take sleep/medications on time (including sleepy time tea, trazadone or herbal treatments such as melatonin), aroma therapy, limit caffeine/sugar, yoga, guided imagery, stretch, meditation, limit naps to 20 minutes, make a temperature change in the room, white noise, be mindful of slowing down breathing, take a warm bath/shower,  frequently wash sheets, and journaling.   Medlineplus.gov is information for patients.  It is run by the [employer] and it contains information about all disorders, diseases and all medications.      COMMUNITY RESOURCES:    CRISIS NUMBERS: Provided in AVS 2023  National Suicide Prevention Lifeline: 8-964-533-TALK (487-936-1034)  "[employer]/resources for a list of additional resources (SOS)            [facility] - [phone]   Urgent Care Adult Mental [place]-[phone] mobile unit/  crisis line  [facility] -[phone]   COPE  [facility] Mobile Team -[phone] (adults)/ [phone] (child)  Poison Control Center - 1-858.239.9235    OR  go to nearest ER  Crisis Text Line for any crisis  send this-   To: 551400   [facility]  [phone]  National Suicide Prevention Lifeline: 407.291.6568 (TTY: 834.120.3237). Call anytime for help.  (www.suicidepreventionlifeline.org)  National [place] on Mental Illness (www.bell.org): 912.902.2563 or 763-199-4681.   Mental Health Association (www.mentalhealth.org): 896.628.1841 or 779-554-0497.  [place] Crisis Text Line: Text [place] to 435048  Suicide LifeLine Chat: suicidepreVizi Labsline.org/chat    ADMINISTRATIVE BILLIN min spent interviewing patient, reviewing referral documents, obtaining and reviewing outside records, communication with other health specialists, and preparing this report this day 7/18/23    Video/Phone Start Time: 11:36 am  Video/Phone End Time:  11:45 am    Greater than 50% of time was spent in counseling and coordination of care regarding above diagnoses and treatment plan.    Patient Status:  Our psychiatry providers act as a specialty service for Primary Care Providers in the [facility] that seek to optimize medications for unstable patients.  Once medications have been optimized, our providers discharge the patient back to the  referring Primary Care Provider for ongoing medication management.  This type of system allows our providers to serve a high volume of patients. At this time  Patient will continue to be seen for ongoing consultation and stabilization.    Signed:   Marcia Singleton MSN, APRN, PMHNP-BC  Long Term Outpatient Psychiatry  Chart documentation done in part with Dragon Voice Recognition software.  Although reviewed after completion, some word and grammatical errors may remain.

## 2023-10-17 ENCOUNTER — VIRTUAL VISIT (OUTPATIENT)
Dept: PSYCHIATRY | Facility: CLINIC | Age: 68
End: 2023-10-17
Payer: COMMERCIAL

## 2023-10-17 DIAGNOSIS — F14.11 COCAINE ABUSE IN REMISSION (H): ICD-10-CM

## 2023-10-17 DIAGNOSIS — F11.11 OPIOID ABUSE, IN REMISSION (H): ICD-10-CM

## 2023-10-17 DIAGNOSIS — F06.0 PSYCHOTIC DISORDER DUE TO MEDICAL CONDITION WITH HALLUCINATIONS: ICD-10-CM

## 2023-10-17 DIAGNOSIS — F41.1 GAD (GENERALIZED ANXIETY DISORDER): ICD-10-CM

## 2023-10-17 DIAGNOSIS — F06.30 MOOD DISORDER DUE TO OLD HEAD TRAUMA: Primary | ICD-10-CM

## 2023-10-17 DIAGNOSIS — F02.80 DEMENTIA DUE TO HEAD TRAUMA WITHOUT BEHAVIORAL DISTURBANCE (H): ICD-10-CM

## 2023-10-17 DIAGNOSIS — S09.90XS DEMENTIA DUE TO HEAD TRAUMA WITHOUT BEHAVIORAL DISTURBANCE (H): ICD-10-CM

## 2023-10-17 DIAGNOSIS — S09.90XS MOOD DISORDER DUE TO OLD HEAD TRAUMA: Primary | ICD-10-CM

## 2023-10-17 PROCEDURE — 99214 OFFICE O/P EST MOD 30 MIN: CPT | Mod: VID | Performed by: NURSE PRACTITIONER

## 2023-10-17 NOTE — PROGRESS NOTES
"PSYCHIATRIC PROGRESS NOTE     Name:  Tavon Arias  : 1955    Tavon Arias is a 67 year old male who is being evaluated via a billable Video visit.      Telemedicine Visit: The patient's condition can be safely assessed and treated via synchronous audio and visual telemedicine encounter.      Reason for Telemedicine Visit: COVID 19 pandemic and the social and physical recommendations by the CDC and MD., Patient has requested telehealth visit and Patient unable to travel      Originating Site (Patient Location):  [facility]    Distant Site (Provider Location): [facility] Outpatient Setting: [facility]    Consent:  The patient/guardian has verbally consented to: the potential risks and benefits of telemedicine (video visit or phone) versus in person care; bill my insurance or make self-payment for services provided; and responsibility for payment of non-covered services.     Mode of Communication:  [employer] [facility] [employer] platform     As the provider I attest to compliance with applicable laws and regulations related to telemedicine.     Date of Last Visit: 23                                        CHIEF COMPLAINT     \"I am doing well\"  HISTORY OF PRESENT ILLNESS     Patient who was last seen in the clinic on 22 returned today for follow up visit.  Patient who lives in a group home attended visit with the group [facility] staff.  Patient reports that there has not been any major changes since last visit, stating he is doing great with no concern.  Patient reported both wound, anxiety and depression are well under control with current medication.  Patient's staff also confirmed that patient has been behaviorally appropriate with less agitation and irritability.  Patient currently denies both suicidal or homicidal ideation.  He also denied both auditory and visual hallucination.  Patient reports no yogi or hypomania.  Patient to return to clinic in 3 months for follow-up.  PSYCHIATRIC HISTORY: "   History of Psychiatric Hospitalizations:   - Inpatient:  Yes, most recently in 2006  - IOP/PHP/Day treatment: Denies  History of Suicidal Ideation:Accidental overdose on heroin in 2006 and he had anoxic brain  History of Suicide Attempts: Accidental overdose on heroin in 2006 and he had anoxic brain  History of Self-injurious Behavior: Denies a history of SIB.  Current:  No  History of Violence/Aggression: History of Verbal agression  History of Commitment? Denies  Electroconvulsive Therapy (ECT): Denies    PSYCHIATRIC REVIEW OF SYSTEMS:   Psychiatric Review of Systems:   Depression:   Reports: depressed mood, helplessness, irritability, low tolerance  Nila:   Denies: sleeplessness, increased goal-directed activities, abrupt increase in energy pressured speech  Psychosis:   Denies: visual hallucinations, auditory hallucinations, paranoia  Anxiety:   Reports: excessive worries that are difficult to control  PTSD:   Denies: re-experiencing past trauma, nightmares, increased arousal, avoidance of traumatic stimuli, impaired function.  Denies: re-experiencing past trauma, nightmares, increased arousal, avoidance of traumatic stimuli, impaired function.  OCD:   Denies: obsessions, checking, symmetry, cleaning, skin picking.  Eating Disorder:   Denies: restriction, binging, purging.    Sleep:   Denies: Sleep deprivation    MEDICATIONS                                                                                                Current Outpatient Medications   Medication Sig    Acetaminophen (TYLENOL PO)     albuterol (PROAIR HFA, PROVENTIL HFA, VENTOLIN HFA) 108 (90 BASE) MCG/ACT inhaler Inhale 2 puffs into the lungs every 6 hours    ASPIRIN PO Take 81 mg by mouth daily     benztropine (COGENTIN) 0.5 MG tablet TAKE 1 TABLET BY MOUTH ONCE DAILY    BENZTROPINE MESYLATE PO Take 0.5 mg by mouth daily    bisacodyl (DULCOLAX) 5 MG EC tablet Take 5 mg by mouth daily as needed for constipation    calcium carbonate-vitamin D  (OYSTER SHELL CALCIUM/D) 500-200 MG-UNIT tablet Take 1 tablet by mouth daily    calcium citrate-vitamin D (CITRACAL) 315-250 MG-UNIT TABS Take by mouth daily    donepezil (ARICEPT) 10 MG tablet TAKE 1 TABLET BY MOUTH EVERY MORNING.    gabapentin (NEURONTIN) 100 MG capsule TAKE 2 CAPSULES (200MG) BY MOUTH THREE TIMES DAILY    Ipratropium-Albuterol (COMBIVENT RESPIMAT)  MCG/ACT inhaler Inhale 1 puff into the lungs 4 times daily    ketoconazole (NIZORAL) 2 % cream Apply topically daily    levETIRAcetam (KEPPRA) 100 MG/ML solution Take 10 mg/kg by mouth 2 times daily    LOSARTAN POTASSIUM PO Take 25 mg by mouth daily     magnesium citrate solution Take 296 mLs by mouth once    METFORMIN HCL PO Take 500 mg by mouth 2 times daily (with meals)     multivitamin w/minerals (THERA-VIT-M) tablet Take 1 tablet by mouth daily    OMEPRAZOLE PO Take 20 mg by mouth every morning     oxybutynin (DITROPAN-XL) 5 MG 24 hr tablet Take 5 mg by mouth 2 times daily     Pediatric Multivitamins-Fl (MULTIVITAMIN WITH 1 MG FLUORIDE) 1 MG CHEW Take 1 tablet by mouth daily    polyethylene glycol (MIRALAX/GLYCOLAX) powder Take 1 capful by mouth daily    psyllium (REGULOID) 58.6 % powder Take 6 g by mouth daily    risperiDONE (RISPERDAL) 0.5 MG tablet TAKE 1 TABLET BY MOUTH TWICE DAILY.    senna-docusate (SENOKOT-S;PERICOLACE) 8.6-50 MG per tablet Take 1 tablet by mouth daily    sertraline (ZOLOFT) 100 MG tablet TAKE 1 TABLET BY MOUTH ONCE DAILY ALONG WITH 50MG FOR TOTAL DOSE = 150MG    sertraline (ZOLOFT) 50 MG tablet TAKE 1 TABLET BY MOUTH ONCE DAILY ALONG WITH 100MG FOR TOTAL DOSE = 150MG    TRAMADOL HCL PO     triamcinolone (KENALOG) 0.025 % cream Apply topically 2 times daily     No current facility-administered medications for this visit.       DRUG MONITORING:  [place] Prescription Monitoring Program evaluating controlled substances in the last year in [place]:  The [place] Prescription Monitoring Program has been reviewed and there  "are no current concerns with: diversionary activity, early refill requests, and or obtaining the medication from multiple providers      PAST PSYCHOTROPIC MEDICATIONS:  Keppra, Trazodone, citalopam    VITALS   There were no vitals taken for this visit.     BP Readings from Last 1 Encounters:   10/15/19 116/73     Pulse Readings from Last 1 Encounters:   10/15/19 59     Wt Readings from Last 1 Encounters:   03/20/19 83.9 kg (185 lb)     Ht Readings from Last 1 Encounters:   10/15/19 1.778 m (5' 10\")     Estimated body mass index is 26.54 kg/m  as calculated from the following:    Height as of 10/15/19: 1.778 m (5' 10\").    Weight as of 3/20/19: 83.9 kg (185 lb).      PERTINENT HISTORY   PAST MEDICAL HISTORY:   Past Medical History:   Diagnosis Date    Anoxic brain injury (H)     Atrial fibrillation (H)     Cardiac arrest (H)     Cocaine abuse (H)     Cocaine abuse, in remission (H)     Created by Conversion     COPD (chronic obstructive pulmonary disease) (H)     Hepatitis A     Hypertension     Mood disorder (H24)     Opioid abuse, in remission (H)     Created by Conversion [facility] Annotation: Aug 11 2008  9:08AM - Trung Harden: HEROIN     Other and unspecified alcohol dependence, in remission     Created by Conversion     Type 2 diabetes mellitus without complications (H)        PAST SURGICAL HISTORY:   Past Surgical History:   Procedure Laterality Date    CHOLECYSTECTOMY      COLONOSCOPY  4/22/2014    Procedure: COLONOSCOPY;  Surgeon: Familia Bain MD;  Location:  GI    COLONOSCOPY N/A 3/29/2017    Procedure: COMBINED COLONOSCOPY, SINGLE OR MULTIPLE BIOPSY/POLYPECTOMY BY BIOPSY;  Surgeon: Cassie Martin MD;  Location:  GI    colostomy and takedown      ZC PART REMOVAL COLON W END COLOSTOMY      Description: Partial Colectomy, End Colostomy & Distal Segment Closure;  Proc Date: 09/27/2003;  Comments: SIGMOID RESECTION AND CROSS POUCH FOR PERF DIVERTICULITIS       FAMILY HISTORY: No family " "history on file.    SOCIAL HISTORY:   Social History     Tobacco Use    Smoking status: Former     Packs/day: 1     Types: Cigarettes     Quit date: 10/13/2014     Years since quittin.0    Smokeless tobacco: Former    Tobacco comments:     stopped smoking per staff   Substance Use Topics    Alcohol use: No         Seizures or Head Injury: Reports history of seizure and TBI  History of cardiac disease, rheumatic fever, fainting or dizziness, especially with exercise, seizures, chest pain or shortness of breath with exercise, unexplained change in exercise tolerance, palpitations, high blood pressure, or heart murmur?   No    LABS & IMAGING                                                                                                                  No lab results found.  No lab results found.  Recent Labs   Lab Test 16  0925   CHOL 139   LDL 80   HDL 37*   TRIG 111   A1C 5.9     No lab results found.  No results found for: \"WJO123\", \"ZNCP904\", \"YMII80SKTKP\", \"VITD3\", \"D2VIT\", \"D3VIT\", \"DTOT\", \"[id number]\", \"[id number]\", \"[id number]\", \"[id number]\", \"[id number]\", \"[id number]\"     ALLERGY & IMMUNIZATIONS       Allergies   Allergen Reactions    Adhesive Tape Swelling    Latex        FAMILY MEDICAL HISTORY:     Family History       No data available              Family history of sudden or unexplained death or an event requiring resuscitation in children or young adults, cardiac arrhythmias (eg, Abbi-Parkinson-White syndrome), long QT syndrome, catecholaminergic paroxysmal ventricular tachycardia, Brugada syndrome, arrhythmogenic right ventricular dysplasia, hypertrophic cardiomyopathy, dilated cardiomyopathy, or Marfan syndrome?  No    FAMILY PSYCHIATRIC HISTORY:   Psychiatric: Questionable  Suicide attempt: Questionable  Chemical dependency: Positive  Diabetes: Positive    SIGNIFICANT SOCIAL/FAMILY HISTORY:                                           No abuse.    His parents  when he was 20 years old. "    He was  and .    He has 2 children.  No contact.    He is on SSDI.  He lives in a group home.    He has conservator for financial decisions    LEGAL: Denies     SUBSTANCE USE HISTORY    Chemical dependency history: Patient had 6 chemical dependency treatments more than 10 years ago  Hx of polysubstance abuse - heroine, cocaine, Alcohol    MEDICAL REVIEW OF SYSTEMS:   Ten system review was completed with pertinent positives noted above    MENTAL STATUS EXAM:   Mental Status Examination (limited due to video virtual visit format):  General:  cooperative  Orientation: Oriented to self, completely disoriented in time and its chronic, partially oriented in place  Speech: Impaired articulation due to TBI  Language: normal naming  Thought process: [place]  Thought content: Denies hallucinations and delusions   Suicidal thoughts: Denies  Homicidal thoughts: Denies  Associations: connected   Affect: irritable due to headache   Mood: depressed   Intellectual functioning: Decreased due to TBI  Memory: Decreased due to TBI   Fund of knowledge: Decreased due to TBI   Attention and concentration: Seems to be at baseline  Gait: I cannot assess it because this is phone visit, but the staff says that he uses walker at home  Psychomotor activity: Mild agitation in his voice  Muscles: I cannot assess it because this is phone visit  InSight and judgment: Limited       SAFETY   Feels safe in home: Yes   Suicidal ideation: Denies  History of suicide attempts:  Yes   Hx of impulsivity: No     DSM 5 DIAGNOSIS:   1. 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 Alcohol abuse, in remission    5. Cocaine abuse in remission (H)    6. KERA (generalized anxiety disorder)      ASSESSMENT AND PLAN    Patient is a 67 year old,  White Not  or  male with a history of mood disorder secondary to traumatic brain  injury, 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 Patient reports symptoms stabilization on current medication regimen.  He has not exhibited agitation or irritability since last visit.  Patient denies SI, SIB, and HI.  He also denied auditory or visual hallucination.  He reported no yogi or hypomania. The patient, staff and I reviewed the diagnosis and treatment plan. Reviewed risks/benefits of medication with patient. Ongoing education given regarding diagnostic and treatment options with adequate verbalization of understanding. RTC in 3 months    Patient agrees with following recommendations:     Plan:  1.Patient will take the medications as prescribed.   Medications: Zoloft 150 mg qam for depression  Risperdal 0.5 mg two times a day for mood   Cogentin 0.5 mg daily for involuntary movement   Aricept  10 mg every am for memory loss  Continue taking gabapentin 200 mg 3 times daily for anxiety and generalized body pain.   Patient will not stop taking medications or adjust them without consulting with the provider.  2.Staff will call with any problems between 2 visits.   3.[facility] staff will take the patient to the emergency room if not feeling safe  or unable to function in the community, or if suicidal, homicidal or hearing voices or having paranoia.   4. [facility] staff will watch his diet and exercise.   5.Patient will see non psychiatric providers for non psychiatric disorders.   6. Next appointment in 2 months  7.  staff will continue to encourage him to use a walker instead a wheelchair  8. I recommend that his sister becomes his gardian due to worsening cognitive abilities and TBI  9. Next appointment in 3 months       CONSULTS/REFERRALS:   Continue therapy  None at this time  Coordinate care with therapist as needed    MEDICAL:   None at this time  Coordinate care  with PCP (Trung Harden) as needed  Follow up with primary care provider as planned or for acute medical concerns.    PSYCHOEDUCATION:  Medication side effects and alternatives reviewed. Health promotion activities recommended and reviewed today. All questions addressed. Education and counseling completed regarding risks and benefits of medications and psychotherapy options.  Consent provided by patient/guardian  Call the psychiatric nurse line with medication questions or concerns at [phone].  [employer] may be used to communicate with your provider, but this is not intended to be used for emergencies.  SEROTONIN SYNDROME:  Discussed risks of Serotonin syndrome (ie, serotonin toxicity) which is a potentially life-threatening condition associated with increased serotonergic activity in the central nervous system (CNS). It is seen with therapeutic medication use, inadvertent interactions between drugs, and intentional self-poisoning. Serotonin syndrome may involve a spectrum of clinical findings, which often include mental status changes, autonomic hyperactivity, and neuromuscular abnormalities.    FIRST GENERATION ANTIPSYCHOTIC/ SECOND GENERATION ANTIPSYCHOTIC USE:  Atypical need for cardiometabolic monitoring with medication- B/P, weight, blood sugar, cholesterol.  Need to monitor for abnormal movements taught  SLEEP HYGIENE: establish a sleep routine, limit screen time 1 hour prior to bed, use bed for sleep only, take sleep/medications on time (including sleepy time tea, trazadone or herbal treatments such as melatonin), aroma therapy, limit caffeine/sugar, yoga, guided imagery, stretch, meditation, limit naps to 20 minutes, make a temperature change in the room, white noise, be mindful of slowing down breathing, take a warm bath/shower, frequently wash sheets, and journaling.   Medlineplus.gov is information for patients.  It is run by the [employer] of Medicine and it contains information about all  disorders, diseases and all medications.      COMMUNITY RESOURCES:    CRISIS NUMBERS: Provided in AVS 2023  National Suicide Prevention Lifeline: 8-381-102-TALK (564-249-3883)  PasswordBank/resources for a list of additional resources (SOS)            [facility] - [phone]   Urgent Care Adult Mental Nfyayg-[phone] mobile unit/  crisis line  [facility] -[phone]   COPE  [place] Mobile Team -[phone] (adults)/ [phone] (child)  Poison Control Center - [phone]    OR  go to nearest ER  Crisis Text Line for any crisis  send this-   To: 731240   [facility]  [phone]  National Suicide Prevention Lifeline: 837.758.4047 (TTY: 567.899.9944). Call anytime for help.  (www.suicidepreventionlifeline.org)  National [place] on Mental Illness (www.bell.org): 118-230-0815 or 625-916-1247.   Mental Health Association (www.mentalhealth.org): 133.767.7338 or 884-810-7934.  [place] Crisis Text Line: Text [place] to 395910  Suicide LifeLine Chat: suicideTraitify.org/chat    ADMINISTRATIVE BILLIN min spent interviewing patient, reviewing referral documents, obtaining and reviewing outside records, communication with other health specialists, and preparing this report this day 10/17/23    Video/Phone Start Time: 11:01 am  Video/Phone End Time:  11:09 am    Greater than 50% of time was spent in counseling and coordination of care regarding above diagnoses and treatment plan.    Patient Status:  Our psychiatry providers act as a specialty service for Primary Care Providers in the [facility] that seek to optimize medications for unstable patients.  Once medications have been optimized, our providers discharge the patient back to the referring Primary Care Provider for ongoing medication management.  This type of system allows our providers to serve a high volume of patients. At this time  Patient will  continue to be seen for ongoing consultation and stabilization.    Signed:   Marcia Singleton, MSN, APRN, PMHNP-BC  Long Term Outpatient Psychiatry  Chart documentation done in part with Dragon Voice Recognition software.  Although reviewed after completion, some word and grammatical errors may remain.

## 2023-10-17 NOTE — NURSING NOTE
Is the patient currently in the state of [place]? YES    Visit mode:VIDEO    If the visit is dropped, the patient can be reconnected by: VIDEO VISIT: Text to cell phone:   No relevant phone numbers on file.       Will anyone else be joining the visit? NO  (If patient encounters technical issues they should call [phone])    How would you like to obtain your AVS? MyChart    Are changes needed to the allergy or medication list? No    Reason for visit: RECHECK    Alverto LACEY

## 2023-10-17 NOTE — PATIENT INSTRUCTIONS
"Patient Education   The [facility] Program  What to Expect  Here's what to expect in the Panel Psychiatry Program.   About the program  You'll be meeting with a psychiatric doctor to check your mental health. A psychiatric doctor helps you deal with troubling thoughts and feelings by giving you medicine. They'll make sure you know the plan for your care. You may see them for a long time. When you're feeling better, they may refer you back to seeing your family doctor.   If you have any questions, we'll be glad to talk to you.  About visits  Be open  At your visits, please talk openly about your problems. It may feel hard, but it's the best way for us to help you.  Cancelling visits  If you can't come to your visit, please call us right away at [phone]. If you don't cancel at least 24 hours (1 full day) before your visit, that's \"late cancellation.\"  Not showing up for your visits  Being very late is the same as not showing up. You'll be a \"no show\" if:  You're more than 15 minutes late for a 30-minute (half hour) visit.  You're more than 30 minutes late for a 60-minute (full hour) visit.  If you cancel late or don't show up 2 times within 6 months, we may end your care.  Getting help between visits  If you need help between visits, you can call us Monday to Friday from 8 a.m. to 4:30 p.m. at [phone].  Emergency care  Call 911 or go to the nearest emergency department if your life or someone else's life is in danger.  Call 988 anytime to reach the national Suicide and Crisis hotline.  Medicine refills  To refill your medicine, call your pharmacy. You can also call [facility]'s Behavioral Access at [phone], Monday to Friday, 8 a.m. to 4:30 p.m. It can take 1 to 3 business days to get a refill.   Forms, letters, and tests  You may have papers to fill out, like FMLA, short-term disability, and workability. We can help you with these forms at your visits, but you must have an " appointment. You may need more than 1 visit for this, to be in an intensive therapy program, or both.  Before we can give you medicine for ADHD, we may refer you to get tested for it or confirm it another way.  We may not be able to give you an emotional support animal letter.  We don't do mental health checks ordered by the court.   We don't do mental health testing, but we can refer you to get tested.   Thank you for choosing us for your care.  For informational purposes only. Not to replace the advice of your health care provider. Copyright   2022 [facility]. All rights reserved. [place] [id number] - 12/22.     Plan:  1.Patient will take the medications as prescribed.   Medications: Zoloft 150 mg qam for depression  Risperdal 0.5 mg two times a day for mood   Cogentin 0.5 mg daily for involuntary movement   Aricept  10 mg every am for memory loss  Continue taking gabapentin 200 mg 3 times daily for anxiety and generalized body pain.   Patient will not stop taking medications or adjust them without consulting with the provider.  2.Staff will call with any problems between 2 visits.   3.senior care staff will take the patient to the emergency room if not feeling safe  or unable to function in the community, or if suicidal, homicidal or hearing voices or having paranoia.   4. senior care staff will watch his diet and exercise.   5.Patient will see non psychiatric providers for non psychiatric disorders.   6. Next appointment in 2 months  7.  staff will continue to encourage him to use a walker instead a wheelchair  8. I recommend that his sister becomes his gardian due to worsening cognitive abilities and TBI  9. Next appointment in 3 months

## 2023-10-17 NOTE — PROGRESS NOTES
Virtual Visit Details    Type of service:  Video Visit   Video Start Time:  1101  Video End Time: 1109    Originating Location (pt. Location): Home    Distant Location (provider location):  Off-site  Platform used for Video Visit: [facility]

## 2023-10-18 DIAGNOSIS — S09.90XS MOOD DISORDER DUE TO OLD HEAD INJURY: ICD-10-CM

## 2023-10-18 DIAGNOSIS — F06.30 MOOD DISORDER DUE TO OLD HEAD INJURY: ICD-10-CM

## 2023-10-18 RX ORDER — SERTRALINE HYDROCHLORIDE 100 MG/1
TABLET, FILM COATED ORAL
Qty: 31 TABLET | Refills: 11 | Status: SHIPPED | OUTPATIENT
Start: 2023-10-18

## 2023-10-18 NOTE — TELEPHONE ENCOUNTER
Date of Last Office Visit: 10/17/23  Date of Next Office Visit: 1/17/23  No shows since last visit: 0  Cancellations since last visit: 0    Medication requested: sertraline (ZOLOFT) 100 MG tablet  Date last ordered: 6/12/23 Qty: 31 Refills: 3    Medication requested: sertraline (ZOLOFT) 50 MG tablet  Date last ordered: 6/12/23 Qty: 31 Refills: 3      Review of [place] ?: NA    Lapse in medication adherence greater than 5 days?: No  If yes, call patient and gather details: na  Medication refill request verified as identical to current order?: Yes  Result of Last DAM, VPA, Li+ Level, CBC, or Carbamazepine Level (at or since last visit): N/A    Last visit treatment plan:   Medications: Zoloft 150 mg qam for depression  Risperdal 0.5 mg two times a day for mood   Cogentin 0.5 mg daily for involuntary movement   Aricept  10 mg every am for memory loss  Continue taking gabapentin 200 mg 3 times daily for anxiety and generalized body pain.     []Medication refilled per  Medication Refill in Ambulatory Care  policy.  [x]Medication unable to be refilled by RN due to criteria not met as indicated below:    []Eligibility - not seen in the last year   []Supervision - no future appointment   []Compliance - no shows, cancellations or lapse in therapy   []Verification - order discrepancy   []Controlled medication   []Medication not included in policy   []90-day supply request   [x]Other

## 2023-10-19 DIAGNOSIS — S09.90XS DEMENTIA DUE TO HEAD TRAUMA WITHOUT BEHAVIORAL DISTURBANCE (H): ICD-10-CM

## 2023-10-19 DIAGNOSIS — S09.90XS MOOD DISORDER DUE TO OLD HEAD INJURY: ICD-10-CM

## 2023-10-19 DIAGNOSIS — F02.80 DEMENTIA DUE TO HEAD TRAUMA WITHOUT BEHAVIORAL DISTURBANCE (H): ICD-10-CM

## 2023-10-19 DIAGNOSIS — R29.818 EXTRAPYRAMIDAL SYMPTOM: ICD-10-CM

## 2023-10-19 DIAGNOSIS — F06.30 MOOD DISORDER DUE TO OLD HEAD INJURY: ICD-10-CM

## 2023-10-19 RX ORDER — RISPERIDONE 0.5 MG/1
TABLET ORAL
Qty: 62 TABLET | Refills: 11 | Status: SHIPPED | OUTPATIENT
Start: 2023-10-19

## 2023-10-19 RX ORDER — DONEPEZIL HYDROCHLORIDE 10 MG/1
TABLET, FILM COATED ORAL
Qty: 31 TABLET | Refills: 11 | Status: SHIPPED | OUTPATIENT
Start: 2023-10-19

## 2023-10-19 RX ORDER — BENZTROPINE MESYLATE 0.5 MG/1
TABLET ORAL
Qty: 31 TABLET | Refills: 11 | Status: SHIPPED | OUTPATIENT
Start: 2023-10-19

## 2023-10-19 NOTE — TELEPHONE ENCOUNTER
Date of Last Office Visit: 10/17/23  Date of Next Office Visit: 1/17/24  No shows since last visit: 0  Cancellations since last visit: 0    Medication requested: donepezil (ARICEPT) 10 MG tablet  Date last ordered: 10/26/22 Qty: 30 Refills: 11    Medication requested: benztropine (COGENTIN) 0.5 MG tablet  Date last ordered: 10/26/22 Qty: 30 Refills: 11     Medication requested: risperiDONE (RISPERDAL) 0.5 MG tablet  Date last ordered: 10/26/22 Qty: 60 Refills: 11        Review of [place] ?: NA    Lapse in medication adherence greater than 5 days?: no  If yes, call patient and gather details: NA  Medication refill request verified as identical to current order?: yes  Result of Last DAM, VPA, Li+ Level, CBC, or Carbamazepine Level (at or since last visit): N/A    Last visit treatment plan:        ASSESSMENT AND PLAN    Patient is a 67 year old,  White Not  or  male with a history of 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 Patient reports symptoms stabilization on current medication regimen.  He has not exhibited agitation or irritability since last visit.  Patient denies SI, SIB, and HI.  He also denied auditory or visual hallucination.  He reported no yogi or hypomania. The patient, staff and I reviewed the diagnosis and treatment plan. Reviewed risks/benefits of medication with patient. Ongoing education given regarding diagnostic and treatment options with adequate verbalization of understanding. RTC in 3 months     Patient agrees with following recommendations:      Plan:  1.Patient will take the medications as prescribed.   Medications: Zoloft 150 mg qam for depression  Risperdal 0.5 mg two times a day for mood   Cogentin 0.5 mg daily for involuntary movement   Aricept  10 mg every am for memory loss  Continue  taking gabapentin 200 mg 3 times daily for anxiety and generalized body pain.   Patient will not stop taking medications or adjust them without consulting with the provider.  2.Staff will call with any problems between 2 visits.   3.residential staff will take the patient to the emergency room if not feeling safe  or unable to function in the community, or if suicidal, homicidal or hearing voices or having paranoia.   4. residential staff will watch his diet and exercise.   5.Patient will see non psychiatric providers for non psychiatric disorders.   6. Next appointment in 2 months  7.  staff will continue to encourage him to use a walker instead a wheelchair  8. I recommend that his sister becomes his gardian due to worsening cognitive abilities and TBI  9. Next appointment in 3 months          []Medication refilled per  Medication Refill in Ambulatory Care  policy.  [x]Medication unable to be refilled by RN due to criteria not met as indicated below:    []Eligibility - not seen in the last year   []Supervision - no future appointment   []Compliance - no shows, cancellations or lapse in therapy   []Verification - order discrepancy   []Controlled medication   [x]Medication not included in policy   [x]90-day supply request   []Other

## 2024-01-17 ENCOUNTER — VIRTUAL VISIT (OUTPATIENT)
Dept: PSYCHIATRY | Facility: CLINIC | Age: 69
End: 2024-01-17
Payer: COMMERCIAL

## 2024-01-17 DIAGNOSIS — F39 MOOD DISORDER (H): Primary | ICD-10-CM

## 2024-01-17 DIAGNOSIS — F14.11 COCAINE ABUSE IN REMISSION (H): ICD-10-CM

## 2024-01-17 DIAGNOSIS — S09.90XS DEMENTIA DUE TO HEAD TRAUMA WITHOUT BEHAVIORAL DISTURBANCE (H): ICD-10-CM

## 2024-01-17 DIAGNOSIS — F02.80 DEMENTIA DUE TO HEAD TRAUMA WITHOUT BEHAVIORAL DISTURBANCE (H): ICD-10-CM

## 2024-01-17 DIAGNOSIS — F41.1 GAD (GENERALIZED ANXIETY DISORDER): ICD-10-CM

## 2024-01-17 PROCEDURE — 99213 OFFICE O/P EST LOW 20 MIN: CPT | Mod: 95 | Performed by: NURSE PRACTITIONER

## 2024-01-17 ASSESSMENT — PAIN SCALES - GENERAL: PAINLEVEL: NO PAIN (0)

## 2024-01-17 ASSESSMENT — PATIENT HEALTH QUESTIONNAIRE - PHQ9
10. IF YOU CHECKED OFF ANY PROBLEMS, HOW DIFFICULT HAVE THESE PROBLEMS MADE IT FOR YOU TO DO YOUR WORK, TAKE CARE OF THINGS AT HOME, OR GET ALONG WITH OTHER PEOPLE: NOT DIFFICULT AT ALL
SUM OF ALL RESPONSES TO PHQ QUESTIONS 1-9: 0
SUM OF ALL RESPONSES TO PHQ QUESTIONS 1-9: 0

## 2024-01-17 NOTE — NURSING NOTE
Is the patient currently in the state of [place]? YES    Visit mode:VIDEO    If the visit is dropped, the patient can be reconnected by: VIDEO VISIT: Text to cell phone:   [phone]       Will anyone else be joining the visit? No  (If patient encounters technical issues they should call [phone])    How would you like to obtain your AVS? MyChart    Are changes needed to the allergy or medication list? No    Rooming Documentation: Assigned questionnaire(s) completed .    Reason for visit: RECHECK     ABHIJIT Reed

## 2024-01-17 NOTE — PATIENT INSTRUCTIONS
"Patient Education   [facility]  What to Expect  Here's what to expect in the [facility].   About the program  You'll be meeting with a psychiatric doctor to check your mental health. A psychiatric doctor helps you deal with troubling thoughts and feelings by giving you medicine. They'll make sure you know the plan for your care. You may see them for a long time. When you're feeling better, they may refer you back to seeing your family doctor.   If you have any questions, we'll be glad to talk to you.  About visits  Be open  At your visits, please talk openly about your problems. It may feel hard, but it's the best way for us to help you.  Cancelling visits  If you can't come to your visit, please call us right away at [phone]. If you don't cancel at least 24 hours (1 full day) before your visit, that's \"late cancellation.\"  Not showing up for your visits  Being very late is the same as not showing up. You'll be a \"no show\" if:  You're more than 15 minutes late for a 30-minute (half hour) visit.  You're more than 30 minutes late for a 60-minute (full hour) visit.  If you cancel late or don't show up 2 times within 6 months, we may end your care.  Getting help between visits  If you need help between visits, you can call us Monday to Friday from 8 a.m. to 4:30 p.m. at [phone].  Emergency care  Call 911 or go to the nearest emergency department if your life or someone else's life is in danger.  Call 988 anytime to reach the national Suicide and Crisis hotline.  Medicine refills  To refill your medicine, call your pharmacy. You can also call [facility] at [phone], Monday to Friday, 8 a.m. to 4:30 p.m. It can take 1 to 3 business days to get a refill.   Forms, letters, and tests  You may have papers to fill out, like FMLA, short-term disability, and workability. We can help you with these forms at your visits, but you must have an " appointment. You may need more than 1 visit for this, to be in an intensive therapy program, or both.  Before we can give you medicine for ADHD, we may refer you to get tested for it or confirm it another way.  We may not be able to give you an emotional support animal letter.  We don't do mental health checks ordered by the court.   We don't do mental health testing, but we can refer you to get tested.   Thank you for choosing us for your care.  For informational purposes only. Not to replace the advice of your health care provider. Copyright   2022 [facility]. All rights reserved. [employer] [id number] - 12/22.     Plan:  1.Patient will take the medications as prescribed.   Medications: Zoloft 150 mg qam for depression  Risperdal 0.5 mg two times a day for mood   Cogentin 0.5 mg daily for involuntary movement   Aricept  10 mg every am for memory loss  Continue taking gabapentin 200 mg 3 times daily for anxiety and generalized body pain.   Patient will not stop taking medications or adjust them without consulting with the provider.  2.Staff will call with any problems between 2 visits.   3.care home staff will take the patient to the emergency room if not feeling safe  or unable to function in the community, or if suicidal, homicidal or hearing voices or having paranoia.   4. care home staff will watch his diet and exercise.   5.Patient will see non psychiatric providers for non psychiatric disorders.   6. Next appointment in 2 months  7.  staff will continue to encourage him to use a walker instead a wheelchair  8. I recommend that his sister becomes his gardian due to worsening cognitive abilities and TBI  9. Next appointment in 3 months

## 2024-01-17 NOTE — PROGRESS NOTES
"PSYCHIATRIC PROGRESS NOTE     Name:  Tavon Arias  : 1955    Tavon Arias is a 67 year old male who is being evaluated via a billable Video visit.      Telemedicine Visit: The patient's condition can be safely assessed and treated via synchronous audio and visual telemedicine encounter.      Reason for Telemedicine Visit: COVID 19 pandemic and the social and physical recommendations by the CDC and MD., Patient has requested telehealth visit and Patient unable to travel      Originating Site (Patient Location):  [facility]    Distant Site (Provider Location): [facility] Outpatient Setting: [facility]    Consent:  The patient/guardian has verbally consented to: the potential risks and benefits of telemedicine (video visit or phone) versus in person care; bill my insurance or make self-payment for services provided; and responsibility for payment of non-covered services.     Mode of Communication:  Cam[employer] platform     As the provider I attest to compliance with applicable laws and regulations related to telemedicine.     Date of Last Visit: 10/17/23                                        CHIEF COMPLAINT     \"I am doing well\"  HISTORY OF PRESENT ILLNESS     Patient who was last seen in the clinic on 10/17/22 returned today for follow up visit.  Patient who lives in a group home attended visit with the [facility] staff.  Patient reports that there has not been any major changes since last visit, stating he is doing great with no concern.  Patient reported mood, anxiety and depression are well under control with current medication.  Patient's staff also confirmed that patient has been behaviorally appropriate with  noagitation and irritability since last visit.  Patient currently denies both suicidal or homicidal ideation.  He also denied both auditory and visual hallucination.  Patient reports no yogi or hypomania.  Patient to return to clinic in 3 months for follow-up.  PSYCHIATRIC " HISTORY:   History of Psychiatric Hospitalizations:   - Inpatient:  Yes, most recently in 2006  - IOP/PHP/Day treatment: Denies  History of Suicidal Ideation:Accidental overdose on heroin in 2006 and he had anoxic brain  History of Suicide Attempts: Accidental overdose on heroin in 2006 and he had anoxic brain  History of Self-injurious Behavior: Denies a history of SIB.  Current:  No  History of Violence/Aggression: History of Verbal agression  History of Commitment? Denies  Electroconvulsive Therapy (ECT): Denies    PSYCHIATRIC REVIEW OF SYSTEMS:   Psychiatric Review of Systems:   Depression:   Reports: depressed mood, helplessness, irritability, low tolerance  Nila:   Denies: sleeplessness, increased goal-directed activities, abrupt increase in energy pressured speech  Psychosis:   Denies: visual hallucinations, auditory hallucinations, paranoia  Anxiety:   Reports: excessive worries that are difficult to control  PTSD:   Denies: re-experiencing past trauma, nightmares, increased arousal, avoidance of traumatic stimuli, impaired function.  Denies: re-experiencing past trauma, nightmares, increased arousal, avoidance of traumatic stimuli, impaired function.  OCD:   Denies: obsessions, checking, symmetry, cleaning, skin picking.  Eating Disorder:   Denies: restriction, binging, purging.    Sleep:   Denies: Sleep deprivation    MEDICATIONS                                                                                                Current Outpatient Medications   Medication Sig    Acetaminophen (TYLENOL PO)     albuterol (PROAIR HFA, PROVENTIL HFA, VENTOLIN HFA) 108 (90 BASE) MCG/ACT inhaler Inhale 2 puffs into the lungs every 6 hours    ASPIRIN PO Take 81 mg by mouth daily     benztropine (COGENTIN) 0.5 MG tablet TAKE 1 TABLET BY MOUTH ONCE DAILY    BENZTROPINE MESYLATE PO Take 0.5 mg by mouth daily    bisacodyl (DULCOLAX) 5 MG EC tablet Take 5 mg by mouth daily as needed for constipation    calcium  carbonate-vitamin D (OYSTER SHELL CALCIUM/D) 500-200 MG-UNIT tablet Take 1 tablet by mouth daily    calcium citrate-vitamin D (CITRACAL) 315-250 MG-UNIT TABS Take by mouth daily    donepezil (ARICEPT) 10 MG tablet TAKE 1 TABLET BY MOUTH EVERY MORNING    gabapentin (NEURONTIN) 100 MG capsule TAKE 2 CAPSULES (200MG) BY MOUTH THREE TIMES DAILY    Ipratropium-Albuterol (COMBIVENT RESPIMAT)  MCG/ACT inhaler Inhale 1 puff into the lungs 4 times daily    ketoconazole (NIZORAL) 2 % cream Apply topically daily    levETIRAcetam (KEPPRA) 100 MG/ML solution Take 10 mg/kg by mouth 2 times daily    LOSARTAN POTASSIUM PO Take 25 mg by mouth daily     magnesium citrate solution Take 296 mLs by mouth once    METFORMIN HCL PO Take 500 mg by mouth 2 times daily (with meals)     multivitamin w/minerals (THERA-VIT-M) tablet Take 1 tablet by mouth daily    OMEPRAZOLE PO Take 20 mg by mouth every morning     oxybutynin (DITROPAN-XL) 5 MG 24 hr tablet Take 5 mg by mouth 2 times daily     Pediatric Multivitamins-Fl (MULTIVITAMIN WITH 1 MG FLUORIDE) 1 MG CHEW Take 1 tablet by mouth daily    polyethylene glycol (MIRALAX/GLYCOLAX) powder Take 1 capful by mouth daily    psyllium (REGULOID) 58.6 % powder Take 6 g by mouth daily    risperiDONE (RISPERDAL) 0.5 MG tablet TAKE 1 TABLET BY MOUTH TWICE DAILY    senna-docusate (SENOKOT-S;PERICOLACE) 8.6-50 MG per tablet Take 1 tablet by mouth daily    sertraline (ZOLOFT) 100 MG tablet TAKE 1 TABLET BY MOUTH ONCE DAILY ALONG WITH 50MG FOR TOTAL DOSE = 150MG    sertraline (ZOLOFT) 50 MG tablet TAKE 1 TABLET BY MOUTH ONCE DAILY ALONG WITH 100MG FOR TOTAL DOSE = 150MG    TRAMADOL HCL PO     triamcinolone (KENALOG) 0.025 % cream Apply topically 2 times daily     No current facility-administered medications for this visit.       DRUG MONITORING:  [place] Prescription Monitoring Program evaluating controlled substances in the last year in [place]:  The [place] Prescription Monitoring Program has been  "reviewed and there are no current concerns with: diversionary activity, early refill requests, and or obtaining the medication from multiple providers      PAST PSYCHOTROPIC MEDICATIONS:  Keppra, Trazodone, citalopam    VITALS   There were no vitals taken for this visit.     BP Readings from Last 1 Encounters:   10/15/19 116/73     Pulse Readings from Last 1 Encounters:   10/15/19 59     Wt Readings from Last 1 Encounters:   03/20/19 83.9 kg (185 lb)     Ht Readings from Last 1 Encounters:   10/15/19 1.778 m (5' 10\")     Estimated body mass index is 26.54 kg/m  as calculated from the following:    Height as of 10/15/19: 1.778 m (5' 10\").    Weight as of 3/20/19: 83.9 kg (185 lb).      PERTINENT HISTORY   PAST MEDICAL HISTORY:   Past Medical History:   Diagnosis Date    Anoxic brain injury (H)     Atrial fibrillation (H)     Cardiac arrest (H)     Cocaine abuse (H)     Cocaine abuse, in remission (H)     Created by Conversion     COPD (chronic obstructive pulmonary disease) (H)     Hepatitis A     Hypertension     Mood disorder (H24)     Opioid abuse, in remission (H)     Created by Conversion [facility] Annotation: Aug 11 2008  9:08AM - Trung Harden: HEROIN     Other and unspecified alcohol dependence, in remission     Created by Conversion     Type 2 diabetes mellitus without complications (H)        PAST SURGICAL HISTORY:   Past Surgical History:   Procedure Laterality Date    CHOLECYSTECTOMY      COLONOSCOPY  4/22/2014    Procedure: COLONOSCOPY;  Surgeon: Familia Bain MD;  Location:  GI    COLONOSCOPY N/A 3/29/2017    Procedure: COMBINED COLONOSCOPY, SINGLE OR MULTIPLE BIOPSY/POLYPECTOMY BY BIOPSY;  Surgeon: Cassie Martin MD;  Location:  GI    colostomy and takedown      [facility] PART REMOVAL COLON W END COLOSTOMY      Description: Partial Colectomy, End Colostomy & Distal Segment Closure;  Proc Date: 09/27/2003;  Comments: SIGMOID RESECTION AND CROSS POUCH FOR PERF DIVERTICULITIS       FAMILY " "HISTORY: No family history on file.    SOCIAL HISTORY:   Social History     Tobacco Use    Smoking status: Former     Packs/day: 1     Types: Cigarettes     Quit date: 10/13/2014     Years since quittin.2     Passive exposure: Never    Smokeless tobacco: Former    Tobacco comments:     stopped smoking per staff   Substance Use Topics    Alcohol use: No         Seizures or Head Injury: Reports history of seizure and TBI  History of cardiac disease, rheumatic fever, fainting or dizziness, especially with exercise, seizures, chest pain or shortness of breath with exercise, unexplained change in exercise tolerance, palpitations, high blood pressure, or heart murmur?   No    LABS & IMAGING                                                                                                                  No lab results found.  No lab results found.  Recent Labs   Lab Test 16  0925   CHOL 139   LDL 80   HDL 37*   TRIG 111   A1C 5.9     No lab results found.  No results found for: \"AHB144\", \"DLET318\", \"JCKF69OMUIB\", \"VITD3\", \"D2VIT\", \"D3VIT\", \"DTOT\", \"[id number]\", \"[id number]\", \"[id number]\", \"[id number]\", \"[id number]\", \"[id number]\"     ALLERGY & IMMUNIZATIONS       Allergies   Allergen Reactions    Adhesive Tape Swelling    Latex        FAMILY MEDICAL HISTORY:     Family History       No data available              Family history of sudden or unexplained death or an event requiring resuscitation in children or young adults, cardiac arrhythmias (eg, Abbi-Parkinson-White syndrome), long QT syndrome, catecholaminergic paroxysmal ventricular tachycardia, Brugada syndrome, arrhythmogenic right ventricular dysplasia, hypertrophic cardiomyopathy, dilated cardiomyopathy, or Marfan syndrome?  No    FAMILY PSYCHIATRIC HISTORY:   Psychiatric: Questionable  Suicide attempt: Questionable  Chemical dependency: Positive  Diabetes: Positive    SIGNIFICANT SOCIAL/FAMILY HISTORY:                                           No abuse.  "   His parents  when he was 20 years old.    He was  and .    He has 2 children.  No contact.    He is on SSDI.  He lives in a group home.    He has conservator for financial decisions    LEGAL: Denies     SUBSTANCE USE HISTORY    Chemical dependency history: Patient had 6 chemical dependency treatments more than 10 years ago  Hx of polysubstance abuse - heroine, cocaine, Alcohol    MEDICAL REVIEW OF SYSTEMS:   Ten system review was completed with pertinent positives noted above    MENTAL STATUS EXAM:   Mental Status Examination (limited due to video virtual visit format):  General:  cooperative  Orientation: Oriented to self, completely disoriented in time and its chronic, partially oriented in place  Speech: Impaired articulation due to TBI  Language: normal naming  Thought process: [place]  Thought content: Denies hallucinations and delusions   Suicidal thoughts: Denies  Homicidal thoughts: Denies  Associations: connected   Affect: irritable due to headache   Mood: depressed   Intellectual functioning: Decreased due to TBI  Memory: Decreased due to TBI   Fund of knowledge: Decreased due to TBI   Attention and concentration: Seems to be at baseline  Gait: I cannot assess it because this is phone visit, but the staff says that he uses walker at home  Psychomotor activity: Mild agitation in his voice  Muscles: I cannot assess it because this is phone visit  InSight and judgment: Limited       SAFETY   Feels safe in home: Yes   Suicidal ideation: Denies  History of suicide attempts:  Yes   Hx of impulsivity: No     DSM 5 DIAGNOSIS:   1. 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 Alcohol abuse, in remission    5. Cocaine abuse in remission (H)    6. KERA (generalized anxiety disorder)      ASSESSMENT AND PLAN    Patient is a 67 year old,  White Not  or  male with a history  of 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  Patient currently residing in an assisted living facility.  Patient reports symptoms stabilization on current medication regimen.  He has not exhibited agitation or irritability since last visit.  Staff confirmed patient has been behaviorally appropriate since last visit.  Patient denies SI, SIB, and HI.  He also denied auditory or visual hallucination.  He reported no yogi or hypomania. The patient, staff and I reviewed the diagnosis and treatment plan. Reviewed risks/benefits of medication with patient. Ongoing education given regarding diagnostic and treatment options with adequate verbalization of understanding. RTC in 3 months    Patient agrees with following recommendations:     Plan:  1.Patient will take the medications as prescribed.   Medications: Zoloft 150 mg qam for depression  Risperdal 0.5 mg two times a day for mood   Cogentin 0.5 mg daily for involuntary movement   Aricept  10 mg every am for memory loss  Continue taking gabapentin 200 mg 3 times daily for anxiety and generalized body pain.   Patient will not stop taking medications or adjust them without consulting with the provider.  2.Staff will call with any problems between 2 visits.   3.long term staff will take the patient to the emergency room if not feeling safe  or unable to function in the community, or if suicidal, homicidal or hearing voices or having paranoia.   4. long term staff will watch his diet and exercise.   5.Patient will see non psychiatric providers for non psychiatric disorders.   6. Next appointment in 2 months  7.  staff will continue to encourage him to use a walker instead a wheelchair  8. I recommend that his sister becomes his gardian due to worsening cognitive abilities and TBI  9.  Next appointment in 3 months       CONSULTS/REFERRALS:   Continue therapy  None at this time  Coordinate care with therapist as needed    MEDICAL:   None at this time  Coordinate care with PCP (Trung Harden) as needed  Follow up with primary care provider as planned or for acute medical concerns.    PSYCHOEDUCATION:  Medication side effects and alternatives reviewed. Health promotion activities recommended and reviewed today. All questions addressed. Education and counseling completed regarding risks and benefits of medications and psychotherapy options.  Consent provided by patient/guardian  Call the psychiatric nurse line with medication questions or concerns at [phone].  [employer] may be used to communicate with your provider, but this is not intended to be used for emergencies.  SEROTONIN SYNDROME:  Discussed risks of Serotonin syndrome (ie, serotonin toxicity) which is a potentially life-threatening condition associated with increased serotonergic activity in the central nervous system (CNS). It is seen with therapeutic medication use, inadvertent interactions between drugs, and intentional self-poisoning. Serotonin syndrome may involve a spectrum of clinical findings, which often include mental status changes, autonomic hyperactivity, and neuromuscular abnormalities.    FIRST GENERATION ANTIPSYCHOTIC/ SECOND GENERATION ANTIPSYCHOTIC USE:  Atypical need for cardiometabolic monitoring with medication- B/P, weight, blood sugar, cholesterol.  Need to monitor for abnormal movements taught  SLEEP HYGIENE: establish a sleep routine, limit screen time 1 hour prior to bed, use bed for sleep only, take sleep/medications on time (including sleepy time tea, trazadone or herbal treatments such as melatonin), aroma therapy, limit caffeine/sugar, yoga, guided imagery, stretch, meditation, limit naps to 20 minutes, make a temperature change in the room, white noise, be mindful of slowing down breathing, take a warm  bath/shower, frequently wash sheets, and journaling.   Medlineplus.gov is information for patients.  It is run by the National Library of Medicine and it contains information about all disorders, diseases and all medications.      COMMUNITY RESOURCES:    CRISIS NUMBERS: Provided in AVS 2023  National Suicide Prevention Lifeline: 1-878-284-TALK (162-717-0608)  Baileyu/resources for a list of additional resources (SOS)            [facility] - [phone]   [facility] [place]-[phone] mobile unit/  crisis line  [facility] -[phone]   COPE  [place] Mobile Team -[phone] (adults)/ [phone] (child)  Poison Control Center - 1-955.628.4842    OR  go to nearest ER  Crisis Text Line for any crisis  send this-   To: 774640   [facility]  [phone]  National Suicide Prevention Lifeline: 968.594.7211 (TTY: 708.782.3535). Call anytime for help.  (www.suicidepreventionlifeline.org)  National [place] on Mental Illness (www.bell.org): 557.300.2022 or 940-264-6447.   Mental Health Association (www.mentalhealth.org): 585.248.7397 or 714-222-5878.  [place] Crisis Text Line: Text [place] to 424956  Suicide LifeLine Chat: suicidepreSkilljarline.org/chat    ADMINISTRATIVE BILLIN min spent interviewing patient, reviewing referral documents, obtaining and reviewing outside records, communication with other health specialists, and preparing this report this day 24    Video/Phone Start Time: 1033  Video/Phone End Time:  1041    Greater than 50% of time was spent in counseling and coordination of care regarding above diagnoses and treatment plan.    Patient Status:  Our psychiatry providers act as a specialty service for Primary Care Providers in the [facility] that seek to optimize medications for unstable patients.  Once medications have been optimized, our providers discharge the patient back to  the referring Primary Care Provider for ongoing medication management.  This type of system allows our providers to serve a high volume of patients. At this time  Patient will continue to be seen for ongoing consultation and stabilization.    Signed:   Marcia Singleton, MSN, APRN, PMHNP-BC  Long Term Outpatient Psychiatry  Chart documentation done in part with Dragon Voice Recognition software.  Although reviewed after completion, some word and grammatical errors may remain.  Answers submitted by the patient for this visit:  Patient Health Questionnaire (Submitted on 1/17/2024)  If you checked off any problems, how difficult have these problems made it for you to do your work, take care of things at home, or get along with other people?: Not difficult at all  PHQ9 TOTAL SCORE: 0

## 2024-01-17 NOTE — PROGRESS NOTES
Virtual Visit Details    Type of service:  Video Visit   Video Start Time:  1033  Video End Time: 1041    Originating Location (pt. Location): Assisted Living    Distant Location (provider location):  Off-site  Platform used for Video Visit: fotopedia  Answers submitted by the patient for this visit:  Patient Health Questionnaire (Submitted on 1/17/2024)  If you checked off any problems, how difficult have these problems made it for you to do your work, take care of things at home, or get along with other people?: Not difficult at all  PHQ9 TOTAL SCORE: 0

## 2024-01-23 DIAGNOSIS — F41.1 GAD (GENERALIZED ANXIETY DISORDER): ICD-10-CM

## 2024-01-23 DIAGNOSIS — S09.90XS MOOD DISORDER DUE TO OLD HEAD INJURY: ICD-10-CM

## 2024-01-23 DIAGNOSIS — F06.30 MOOD DISORDER DUE TO OLD HEAD INJURY: ICD-10-CM

## 2024-01-23 RX ORDER — GABAPENTIN 100 MG/1
CAPSULE ORAL
Qty: 186 CAPSULE | Refills: 0 | Status: SHIPPED | OUTPATIENT
Start: 2024-01-23 | End: 2024-02-19

## 2024-01-23 NOTE — TELEPHONE ENCOUNTER
Date of Last Office Visit: 1/17/24  Date of Next Office Visit: 4/17/24  No shows since last visit: 0  Cancellations since last visit: 0    Medication requested: gabapentin (NEURONTIN) 100 MG capsule  Date last ordered: 12/20/23 Qty: 186 Refills: 0     [place] of [place] ?: yes  Medication last sold date: 12/25/23 Qty filled: 186  Other controlled substance on [place] ?: no    Lapse in medication adherence greater than 5 days?: no  If yes, call patient and gather details: na  Medication refill request verified as identical to current order?: no sig   Result of Last DAM, VPA, Li+ Level, CBC, or Carbamazepine Level (at or since last visit): N/A    Last visit treatment plan:     Plan:  1.Patient will take the medications as prescribed.   Medications: Zoloft 150 mg qam for depression  Risperdal 0.5 mg two times a day for mood   Cogentin 0.5 mg daily for involuntary movement   Aricept  10 mg every am for memory loss  Continue taking gabapentin 200 mg 3 times daily for anxiety and generalized body pain.   Patient will not stop taking medications or adjust them without consulting with the provider.  2.Staff will call with any problems between 2 visits.   3.skilled nursing staff will take the patient to the emergency room if not feeling safe  or unable to function in the community, or if suicidal, homicidal or hearing voices or having paranoia.   4. skilled nursing staff will watch his diet and exercise.   5.Patient will see non psychiatric providers for non psychiatric disorders.   6. Next appointment in 2 months  7.  staff will continue to encourage him to use a walker instead a wheelchair  8. I recommend that his sister becomes his gardian due to worsening cognitive abilities and TBI  9. Next appointment in 3 months       []Medication refilled per  Medication Refill in Ambulatory Care  policy.  [x]Medication unable to be refilled by RN due to criteria not met as indicated below:    []Eligibility - not seen in the last  year   []Supervision - no future appointment   []Compliance - no shows, cancellations or lapse in therapy   []Verification - order discrepancy   [x]Controlled medication   []Medication not included in policy   []90-day supply request   []Other

## 2024-02-19 DIAGNOSIS — S09.90XS MOOD DISORDER DUE TO OLD HEAD INJURY: ICD-10-CM

## 2024-02-19 DIAGNOSIS — F06.30 MOOD DISORDER DUE TO OLD HEAD INJURY: ICD-10-CM

## 2024-02-19 DIAGNOSIS — F41.1 GAD (GENERALIZED ANXIETY DISORDER): ICD-10-CM

## 2024-02-19 RX ORDER — GABAPENTIN 100 MG/1
CAPSULE ORAL
Qty: 180 CAPSULE | Refills: 0 | Status: SHIPPED | OUTPATIENT
Start: 2024-02-19 | End: 2024-02-20

## 2024-02-20 DIAGNOSIS — S09.90XS MOOD DISORDER DUE TO OLD HEAD INJURY: ICD-10-CM

## 2024-02-20 DIAGNOSIS — F06.30 MOOD DISORDER DUE TO OLD HEAD INJURY: ICD-10-CM

## 2024-02-20 DIAGNOSIS — F41.1 GAD (GENERALIZED ANXIETY DISORDER): ICD-10-CM

## 2024-02-20 RX ORDER — GABAPENTIN 100 MG/1
CAPSULE ORAL
Qty: 186 CAPSULE | Refills: 0 | Status: SHIPPED | OUTPATIENT
Start: 2024-02-20 | End: 2024-03-11

## 2024-02-20 NOTE — TELEPHONE ENCOUNTER
Reason for call:  Medication   If this is a refill request, has the caller requested the refill from the pharmacy already? Yes  Will the patient be using a [facility]? No  Name of the pharmacy and phone number for the current request: [facility] [place] [phone]  ask to speak with pharmacist    Name of the medication requested: Gabapentin 100 mg was ordered for 180 capsules 30 day supply can we get it for 186 capsule for 31 days in March- scropt needs adjusting    Other request: NA    Phone number to reach patient:  Other phone number:  [phone]     Best Time:  asap    Can we leave a detailed message on this number?  YES    Travel screening: Not Applicable

## 2024-02-20 NOTE — TELEPHONE ENCOUNTER
RN reviewed telephone encounter    [facility] a [facility] 186 capsules of  gabapentin (NEURONTIN) 100 MG capsule d/t March having  31 days, a quantity of 180 was sent yesterday which is only enough for 30 days.    RN forwarding to Yobani Singleton NP for review and agpproval.    Amanda Crisostomo RN on 2/20/2024 at 11:52 AM  .

## 2024-03-08 DIAGNOSIS — F41.1 GAD (GENERALIZED ANXIETY DISORDER): ICD-10-CM

## 2024-03-08 DIAGNOSIS — S09.90XS MOOD DISORDER DUE TO OLD HEAD INJURY: ICD-10-CM

## 2024-03-08 DIAGNOSIS — F06.30 MOOD DISORDER DUE TO OLD HEAD INJURY: ICD-10-CM

## 2024-03-11 RX ORDER — GABAPENTIN 100 MG/1
CAPSULE ORAL
Qty: 186 CAPSULE | Refills: 11 | Status: SHIPPED | OUTPATIENT
Start: 2024-03-11

## 2024-03-11 NOTE — TELEPHONE ENCOUNTER
Pharmacy([facility]) was contacted to verify request. Spoke with Manan, he reports that [facility] is looking for longer period of refill availably (they requested 11 months)     Date of Last Office Visit: 1/17/24  Date of Next Office Visit: 4/17/24  No shows since last visit: 0  Cancellations since last visit: 0    Medication requested: gabapentin (NEURONTIN) 100 MG capsule  Date last ordered: 2/20/24 Qty: 186 Refills: 0     [place] of [place] ?: yes  Medication last sold date: 2/26/24 Qty filled: 186  Other controlled substance on [place] ?: no  If yes, is this a new medication?: no  If yes, name of medication: NA and date filled: NA    Lapse in medication adherence greater than 5 days?: no  If yes, call patient and gather details: NA  Medication refill request verified as identical to current order?: yes  Result of Last DAM, VPA, Li+ Level, CBC, or Carbamazepine Level (at or since last visit): N/A    Last visit treatment plan:   Plan:  1.Patient will take the medications as prescribed.   Medications: Zoloft 150 mg qam for depression  Risperdal 0.5 mg two times a day for mood   Cogentin 0.5 mg daily for involuntary movement   Aricept  10 mg every am for memory loss  Continue taking gabapentin 200 mg 3 times daily for anxiety and generalized body pain.   Patient will not stop taking medications or adjust them without consulting with the provider.  2.Staff will call with any problems between 2 visits.   3.[facility] staff will take the patient to the emergency room if not feeling safe  or unable to function in the community, or if suicidal, homicidal or hearing voices or having paranoia.   4. [facility] staff will watch his diet and exercise.   5.Patient will see non psychiatric providers for non psychiatric disorders.   6. Next appointment in 2 months  7.  staff will continue to encourage him to use a walker instead a wheelchair  8. I recommend that his sister becomes his gardian due to worsening cognitive abilities and  TBI  9. Next appointment in 3 months       []Medication refilled per  Medication Refill in Ambulatory Care  policy.  [x]Medication unable to be refilled by RN due to criteria not met as indicated below:    []Eligibility - not seen in the last year   []Supervision - no future appointment   []Compliance - no shows, cancellations or lapse in therapy   []Verification - order discrepancy   []Controlled medication   []Medication not included in policy   [x]90-day supply request (pharmacy requesting multiple month for packaging group home meds)   []Other

## 2024-04-17 ENCOUNTER — VIRTUAL VISIT (OUTPATIENT)
Dept: PSYCHIATRY | Facility: CLINIC | Age: 69
End: 2024-04-17
Payer: COMMERCIAL

## 2024-04-17 DIAGNOSIS — F06.0 PSYCHOTIC DISORDER DUE TO MEDICAL CONDITION WITH HALLUCINATIONS: ICD-10-CM

## 2024-04-17 DIAGNOSIS — F02.80 DEMENTIA DUE TO HEAD TRAUMA WITHOUT BEHAVIORAL DISTURBANCE (H): ICD-10-CM

## 2024-04-17 DIAGNOSIS — F11.20 OPIATE DEPENDENCE, CONTINUOUS (H): ICD-10-CM

## 2024-04-17 DIAGNOSIS — S09.90XS DEMENTIA DUE TO HEAD TRAUMA WITHOUT BEHAVIORAL DISTURBANCE (H): ICD-10-CM

## 2024-04-17 DIAGNOSIS — F39 MOOD DISORDER (H): ICD-10-CM

## 2024-04-17 DIAGNOSIS — F02.818: Primary | ICD-10-CM

## 2024-04-17 PROCEDURE — 99214 OFFICE O/P EST MOD 30 MIN: CPT | Mod: 95 | Performed by: NURSE PRACTITIONER

## 2024-04-17 RX ORDER — ROSUVASTATIN CALCIUM 10 MG/1
5 TABLET, COATED ORAL
COMMUNITY

## 2024-04-17 RX ORDER — ASPIRIN 81 MG
TABLET,CHEWABLE ORAL
COMMUNITY
Start: 2023-12-06

## 2024-04-17 RX ORDER — ROSUVASTATIN CALCIUM 5 MG/1
1 TABLET, COATED ORAL DAILY
COMMUNITY
Start: 2023-10-23

## 2024-04-17 ASSESSMENT — PATIENT HEALTH QUESTIONNAIRE - PHQ9
10. IF YOU CHECKED OFF ANY PROBLEMS, HOW DIFFICULT HAVE THESE PROBLEMS MADE IT FOR YOU TO DO YOUR WORK, TAKE CARE OF THINGS AT HOME, OR GET ALONG WITH OTHER PEOPLE: NOT DIFFICULT AT ALL
SUM OF ALL RESPONSES TO PHQ QUESTIONS 1-9: 1
SUM OF ALL RESPONSES TO PHQ QUESTIONS 1-9: 1

## 2024-04-17 ASSESSMENT — PAIN SCALES - GENERAL: PAINLEVEL: MILD PAIN (3)

## 2024-04-17 NOTE — LETTER
April 19, 2024      Tavon Arias  [address]  [place] [address]        Dear Tavon,    Thank you for choosing [facility] for your care.  We have included a summary of your after-visit instructions.    If you have any questions or need help with scheduling, please contact the clinic from which you receive your primary care. If you have any additional questions, call us at [phone].    Yours in [facility],   [facility] Team

## 2024-04-17 NOTE — PROGRESS NOTES
"PSYCHIATRIC PROGRESS NOTE     Name:  Tavon Arias  : 1955    Tavon Arias is a 67 year old male who is being evaluated via a billable Video visit.      Telemedicine Visit: The patient's condition can be safely assessed and treated via synchronous audio and visual telemedicine encounter.      Reason for Telemedicine Visit: COVID 19 pandemic and the social and physical recommendations by the CDC and MD., Patient has requested telehealth visit and Patient unable to travel      Originating Site (Patient Location):  [facility]    Distant Site (Provider Location): [facility] Outpatient Setting: [facility]    Consent:  The patient/guardian has verbally consented to: the potential risks and benefits of telemedicine (video visit or phone) versus in person care; bill my insurance or make self-payment for services provided; and responsibility for payment of non-covered services.     Mode of Communication:  [employer] platform     As the provider I attest to compliance with applicable laws and regulations related to telemedicine.     Date of Last Visit: 24                                      CHIEF COMPLAINT     \"I am doing well\"  HISTORY OF PRESENT ILLNESS     Patient who was last seen in the clinic on 24 returned today for follow up visit.  Patient who lives in a group home attended visit with the [facility] staff.  Patient reports that there has not been any major changes since last visit, stating he is doing great with no concern.  Patient reported mood, anxiety and depression are well under control with current medication.  Patient's staff also confirmed that patient has been behaviorally appropriate with  noagitation and irritability since last visit.  Patient does mention he has not been doing much activity and the facility of which staff reported patient enjoys watching TV, and going outside getting fresh care and smoking cigarettes.  Patient currently denies both suicidal or " homicidal ideation.  He also denied both auditory and visual hallucination.  Patient reports no nila or hypomania.  Patient to return to clinic in 3 months for follow-up.  PSYCHIATRIC HISTORY:   History of Psychiatric Hospitalizations:   - Inpatient:  Yes, most recently in 2006  - IOP/PHP/Day treatment: Denies  History of Suicidal Ideation:Accidental overdose on heroin in 2006 and he had anoxic brain  History of Suicide Attempts: Accidental overdose on heroin in 2006 and he had anoxic brain  History of Self-injurious Behavior: Denies a history of SIB.  Current:  No  History of Violence/Aggression: History of Verbal agression  History of Commitment? Denies  Electroconvulsive Therapy (ECT): Denies    PSYCHIATRIC REVIEW OF SYSTEMS:   Psychiatric Review of Systems:   Depression:   Reports: depressed mood, helplessness, irritability, low tolerance  Nila:   Denies: sleeplessness, increased goal-directed activities, abrupt increase in energy pressured speech  Psychosis:   Denies: visual hallucinations, auditory hallucinations, paranoia  Anxiety:   Reports: excessive worries that are difficult to control  PTSD:   Denies: re-experiencing past trauma, nightmares, increased arousal, avoidance of traumatic stimuli, impaired function.  Denies: re-experiencing past trauma, nightmares, increased arousal, avoidance of traumatic stimuli, impaired function.  OCD:   Denies: obsessions, checking, symmetry, cleaning, skin picking.  Eating Disorder:   Denies: restriction, binging, purging.    Sleep:   Denies: Sleep deprivation    MEDICATIONS                                                                                                Current Outpatient Medications   Medication Sig Dispense Refill    Acetaminophen (TYLENOL PO)       albuterol (PROAIR HFA, PROVENTIL HFA, VENTOLIN HFA) 108 (90 BASE) MCG/ACT inhaler Inhale 2 puffs into the lungs every 6 hours      ASPIRIN PO Take 81 mg by mouth daily       BENZTROPINE MESYLATE PO Take 0.5  mg by mouth daily      bisacodyl (DULCOLAX) 5 MG EC tablet Take 5 mg by mouth daily as needed for constipation      calcium carbonate-vitamin D (OYSTER SHELL CALCIUM/D) 500-200 MG-UNIT tablet Take 1 tablet by mouth daily      Capsaicin 0.1 % cream APPLY A THIN LAYER TO SORE AREAS ON CHEST/BACK THREE TIMES DAILY AS NEEDED FOR PAIN      donepezil (ARICEPT) 10 MG tablet TAKE 1 TABLET BY MOUTH EVERY MORNING 31 tablet 11    gabapentin (NEURONTIN) 100 MG capsule TAKE 2 CAPSULES (200MG) BY MOUTH THREE TIMES DAILY  *1 TOTAL FILL* 186 capsule 11    LOSARTAN POTASSIUM PO Take 25 mg by mouth daily       METFORMIN HCL PO Take 500 mg by mouth 2 times daily (with meals)       multivitamin w/minerals (THERA-VIT-M) tablet Take 1 tablet by mouth daily      OMEPRAZOLE PO Take 20 mg by mouth every morning       oxybutynin (DITROPAN-XL) 5 MG 24 hr tablet Take 5 mg by mouth 2 times daily       psyllium (REGULOID) 58.6 % powder Take 6 g by mouth daily      risperiDONE (RISPERDAL) 0.5 MG tablet TAKE 1 TABLET BY MOUTH TWICE DAILY 62 tablet 11    sertraline (ZOLOFT) 100 MG tablet TAKE 1 TABLET BY MOUTH ONCE DAILY ALONG WITH 50MG FOR TOTAL DOSE = 150MG 31 tablet 11    sertraline (ZOLOFT) 50 MG tablet TAKE 1 TABLET BY MOUTH ONCE DAILY ALONG WITH 100MG FOR TOTAL DOSE = 150MG 31 tablet 11    triamcinolone (KENALOG) 0.025 % cream Apply topically 2 times daily      benztropine (COGENTIN) 0.5 MG tablet TAKE 1 TABLET BY MOUTH ONCE DAILY 31 tablet 11    calcium citrate-vitamin D (CITRACAL) 315-250 MG-UNIT TABS Take by mouth daily      Ipratropium-Albuterol (COMBIVENT RESPIMAT)  MCG/ACT inhaler Inhale 1 puff into the lungs 4 times daily      ketoconazole (NIZORAL) 2 % cream Apply topically daily      levETIRAcetam (KEPPRA) 100 MG/ML solution Take 10 mg/kg by mouth 2 times daily      magnesium citrate solution Take 296 mLs by mouth once      Pediatric Multivitamins-Fl (MULTIVITAMIN WITH 1 MG FLUORIDE) 1 MG CHEW Take 1 tablet by mouth daily       "polyethylene glycol (MIRALAX/GLYCOLAX) powder Take 1 capful by mouth daily      rosuvastatin (CRESTOR) 10 MG tablet Take 5 mg by mouth      senna-docusate (SENOKOT-S;PERICOLACE) 8.6-50 MG per tablet Take 1 tablet by mouth daily      TRAMADOL HCL PO        No current facility-administered medications for this visit.       DRUG MONITORING:  [place] Prescription Monitoring Program evaluating controlled substances in the last year in [place]:  The [place] Pres[place] Monitoring [place] has been reviewed and there are no current concerns with: diversionary activity, early refill requests, and or obtaining the medication from multiple providers      PAST PSYCHOTROPIC MEDICATIONS:  Keppra, Trazodone, citalopam    VITALS   There were no vitals taken for this visit.     BP Readings from Last 1 Encounters:   10/15/19 116/73     Pulse Readings from Last 1 Encounters:   10/15/19 59     Wt Readings from Last 1 Encounters:   03/20/19 83.9 kg (185 lb)     Ht Readings from Last 1 Encounters:   10/15/19 1.778 m (5' 10\")     Estimated body mass index is 26.54 kg/m  as calculated from the following:    Height as of 10/15/19: 1.778 m (5' 10\").    Weight as of 3/20/19: 83.9 kg (185 lb).      PERTINENT HISTORY   PAST MEDICAL HISTORY:   Past Medical History:   Diagnosis Date    Anoxic brain injury (H)     Atrial fibrillation (H)     Cardiac arrest (H)     Cocaine abuse (H)     Cocaine abuse, in remission (H)     Created by Conversion     COPD (chronic obstructive pulmonary disease) (H)     Hepatitis A     Hypertension     Mood disorder (H24)     Opioid abuse, in remission (H)     Created by Conversion [facility] Annotation: Aug 11 2008  9:08HAIM - Trung Harden: HEROIN     Other and unspecified alcohol dependence, in remission     Created by Conversion     Type 2 diabetes mellitus without complications (H)        PAST SURGICAL HISTORY:   Past Surgical History:   Procedure Laterality Date    CHOLECYSTECTOMY      COLONOSCOPY  4/22/2014    " "Procedure: COLONOSCOPY;  Surgeon: Familia Bain MD;  Location:  GI    COLONOSCOPY N/A 3/29/2017    Procedure: COMBINED COLONOSCOPY, SINGLE OR MULTIPLE BIOPSY/POLYPECTOMY BY BIOPSY;  Surgeon: Cassie Martin MD;  Location:  GI    colostomy and takedown      ZZC PART REMOVAL COLON W END COLOSTOMY      Description: Partial Colectomy, End Colostomy & Distal Segment Closure;  Proc Date: 2003;  Comments: SIGMOID RESECTION AND CROSS POUCH FOR PERF DIVERTICULITIS       FAMILY HISTORY: No family history on file.    SOCIAL HISTORY:   Social History     Tobacco Use    Smoking status: Former     Current packs/day: 0.00     Types: Cigarettes     Quit date: 10/13/2014     Years since quittin.5     Passive exposure: Never    Smokeless tobacco: Former    Tobacco comments:     stopped smoking per staff   Substance Use Topics    Alcohol use: No         Seizures or Head Injury: Reports history of seizure and TBI  History of cardiac disease, rheumatic fever, fainting or dizziness, especially with exercise, seizures, chest pain or shortness of breath with exercise, unexplained change in exercise tolerance, palpitations, high blood pressure, or heart murmur?   No    LABS & IMAGING                                                                                                                  No lab results found.  No lab results found.  Recent Labs   Lab Test 16  0925   CHOL 139   LDL 80   HDL 37*   TRIG 111   A1C 5.9     No lab results found.  No results found for: \"YEZ497\", \"XQCP340\", \"[id number]\", \"VITD3\", \"D2VIT\", \"D3VIT\", \"DTOT\", \"[id number]\", \"[id number]\", \"[id number]\", \"[id number]\", \"[id number]\", \"[id number]\"     ALLERGY & IMMUNIZATIONS       Allergies   Allergen Reactions    Adhesive Tape Swelling    Latex        FAMILY MEDICAL HISTORY:     Family History       No data available              Family history of sudden or unexplained death or an event requiring resuscitation in children or " young adults, cardiac arrhythmias (eg, Abbi-Parkinson-White syndrome), long QT syndrome, catecholaminergic paroxysmal ventricular tachycardia, Brugada syndrome, arrhythmogenic right ventricular dysplasia, hypertrophic cardiomyopathy, dilated cardiomyopathy, or Marfan syndrome?  No    FAMILY PSYCHIATRIC HISTORY:   Psychiatric: Questionable  Suicide attempt: Questionable  Chemical dependency: Positive  Diabetes: Positive    SIGNIFICANT SOCIAL/FAMILY HISTORY:                                           No abuse.    His parents  when he was 20 years old.    He was  and .    He has 2 children.  No contact.    He is on SSDI.  He lives in a group home.    He has [employer] for financial decisions    LEGAL: Denies     SUBSTANCE USE HISTORY    Chemical dependency history: Patient had 6 chemical dependency treatments more than 10 years ago  Hx of polysubstance abuse - heroine, cocaine, Alcohol    MEDICAL REVIEW OF SYSTEMS:   Ten system review was completed with pertinent positives noted above    MENTAL STATUS EXAM:   Mental Status Examination (limited due to video virtual visit format):  General:  cooperative  Orientation: Oriented to self, completely disoriented in time and its chronic, partially oriented in place  Speech: Impaired articulation due to TBI  Language: normal naming  Thought process: [place]  Thought content: Denies hallucinations and delusions   Suicidal thoughts: Denies  Homicidal thoughts: Denies  Associations: connected   Affect: irritable due to headache   Mood: depressed   Intellectual functioning: Decreased due to TBI  Memory: Decreased due to TBI   Fund of knowledge: Decreased due to TBI   Attention and concentration: Seems to be at baseline  Gait: I cannot assess it because this is phone visit, but the staff says that he uses walker at home  Psychomotor activity: Mild agitation in his voice  Muscles: I cannot assess it because this is phone visit  InSight and judgment: Limited        SAFETY   Feels safe in home: Yes   Suicidal ideation: Denies  History of suicide attempts:  Yes   Hx of impulsivity: No     DSM 5 DIAGNOSIS:   1. 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 Alcohol abuse, in remission    5. Cocaine abuse in remission (H)    6. KERA (generalized anxiety disorder)      ASSESSMENT AND PLAN    Patient is a 67 year old,  White Not  or  male with a history of 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  Patient currently residing in an assisted living facility.  Patient reports symptoms stabilization on current medication regimen.  He has not exhibited agitation or irritability since last visit.  Staff confirmed patient has been behaviorally appropriate since last visit.  Patient denies SI, SIB, and HI.  He also denied auditory or visual hallucination.  He reported no yogi or hypomania. The patient, staff and I reviewed the diagnosis and treatment plan. Reviewed risks/benefits of medication with patient. Ongoing education given regarding diagnostic and treatment options with adequate verbalization of understanding. RTC in 3 months    Patient agrees with following recommendations:     Plan:  1.Patient will take the medications as prescribed.   Medications: Zoloft 150 mg qam for depression  Risperdal 0.5 mg two times a day for mood   Cogentin 0.5 mg daily for involuntary movement   Aricept  10 mg every am for memory loss  Continue taking gabapentin 200 mg 3 times daily for anxiety and generalized body pain.   Patient will not stop taking medications or adjust them without consulting with the provider.  2.Staff will call with any problems between 2 visits.   3.long-term staff will take  the patient to the emergency room if not feeling safe  or unable to function in the community, or if suicidal, homicidal or hearing voices or having paranoia.   4. snf staff will watch his diet and exercise.   5.Patient will see non psychiatric providers for non psychiatric disorders.   6. Next appointment in 2 months  7.  staff will continue to encourage him to use a walker instead a wheelchair  8. I recommend that his sister becomes his gardian due to worsening cognitive abilities and TBI  9. Next appointment in 3 months       CONSULTS/REFERRALS:   Continue therapy  None at this time  Coordinate care with therapist as needed    MEDICAL:   None at this time  Coordinate care with PCP (Trung Harden) as needed  Follow up with primary care provider as planned or for acute medical concerns.    PSYCHOEDUCATION:  Medication side effects and alternatives reviewed. Health promotion activities recommended and reviewed today. All questions addressed. Education and counseling completed regarding risks and benefits of medications and psychotherapy options.  Consent provided by patient/guardian  Call the psychiatric nurse line with medication questions or concerns at [phone].  AMDLhart may be used to communicate with your provider, but this is not intended to be used for emergencies.  SEROTONIN SYNDROME:  Discussed risks of Serotonin syndrome (ie, serotonin toxicity) which is a potentially life-threatening condition associated with increased serotonergic activity in the central nervous system (CNS). It is seen with therapeutic medication use, inadvertent interactions between drugs, and intentional self-poisoning. Serotonin syndrome may involve a spectrum of clinical findings, which often include mental status changes, autonomic hyperactivity, and neuromuscular abnormalities.    FIRST GENERATION ANTIPSYCHOTIC/ SECOND GENERATION ANTIPSYCHOTIC USE:  Atypical need for cardiometabolic monitoring with medication- B/P,  weight, blood sugar, cholesterol.  Need to monitor for abnormal movements taught  SLEEP HYGIENE: establish a sleep routine, limit screen time 1 hour prior to bed, use bed for sleep only, take sleep/medications on time (including sleepy time tea, trazadone or herbal treatments such as melatonin), aroma therapy, limit caffeine/sugar, yoga, guided imagery, stretch, meditation, limit naps to 20 minutes, make a temperature change in the room, white noise, be mindful of slowing down breathing, take a warm bath/shower, frequently wash sheets, and journaling.   Medlineplus.gov is information for patients.  It is run by the [employer] and it contains information about all disorders, diseases and all medications.      COMMUNITY RESOURCES:    CRISIS NUMBERS: Provided in AVS 2023  National Suicide Prevention Lifeline: 6-768-527-TALK (360-628-5043)  [employer]/resources for a list of additional resources (SOS)            [facility] - [phone]   Urgent Care Adult Mental [facility]owl-[phone] mobile unit/  crisis line  [facility] -[phone]   COPE  [place] Mobile Team -[phone] (adults)/ [phone] (child)  Poison Control Center - 1-735.536.2763    OR  go to nearest ER  Crisis Text Line for any crisis  send this-   To: 891344   [facility] ([facility]  [phone]  National Suicide Prevention Lifeline: 329.106.4464 (TTY: 337.908.4720). Call anytime for help.  (www.suicidepreventionlifeline.org)  National [place] on Mental Illness (www.bell.org): 532.356.2384 or 115-954-2626.   Mental Health Association (www.mentalhealth.org): 221.171.4010 or 439-496-1618.  [place] Crisis Text Line: Text [place] to 990422  Suicide LifeLine Chat: suicideVuMedi.org/chat    ADMINISTRATIVE BILLIN min spent interviewing patient, reviewing referral documents, obtaining and reviewing outside records, communication with other health  specialists, and preparing this report this day 4/17/24    Video/Phone Start Time: 1006  Video/Phone End Time:  1017    Greater than 50% of time was spent in counseling and coordination of care regarding above diagnoses and treatment plan.    Patient Status:  Our psychiatry providers act as a specialty service for Primary Care Providers in the [facility] that seek to optimize medications for unstable patients.  Once medications have been optimized, our providers discharge the patient back to the referring Primary Care Provider for ongoing medication management.  This type of system allows our providers to serve a high volume of patients. At this time  Patient will continue to be seen for ongoing consultation and stabilization.    Signed:   Marcia Singleton, MSN, APRN, PMHNP-BC  Long Term Outpatient Psychiatry  Chart documentation done in part with Dragon Voice Recognition software.  Although reviewed after completion, some word and grammatical errors may remain.  Answers submitted by the patient for this visit:  Patient Health Questionnaire (Submitted on 1/17/2024)  If you checked off any problems, how difficult have these problems made it for you to do your work, take care of things at home, or get along with other people?: Not difficult at all  PHQ9 TOTAL SCORE: 0    Answers submitted by the patient for this visit:  Patient Health Questionnaire (Submitted on 4/17/2024)  If you checked off any problems, how difficult have these problems made it for you to do your work, take care of things at home, or get along with other people?: Not difficult at all  PHQ9 TOTAL SCORE: 1

## 2024-04-17 NOTE — PROGRESS NOTES
Virtual Visit Details    Type of service:  Video Visit   Video Start Time:  1006  Video End Time: 1017    Originating Location (pt. Location): Assisted Living    Distant Location (provider location):  Off-site  Platform used for Video Visit: [place]  Answers submitted by the patient for this visit:  Patient Health Questionnaire (Submitted on 4/17/2024)  If you checked off any problems, how difficult have these problems made it for you to do your work, take care of things at home, or get along with other people?: Not difficult at all  PHQ9 TOTAL SCORE: 1

## 2024-04-17 NOTE — PATIENT INSTRUCTIONS
"Patient Education   The [facility]  What to Expect  Here's what to expect in the Panel Psych[facility] Program.   About the program  You'll be meeting with a psychiatric doctor to check your mental health. A psychiatric doctor helps you deal with troubling thoughts and feelings by giving you medicine. They'll make sure you know the plan for your care. You may see them for a long time. When you're feeling better, they may refer you back to seeing your family doctor.   If you have any questions, we'll be glad to talk to you.  About visits  Be open  At your visits, please talk openly about your problems. It may feel hard, but it's the best way for us to help you.  Cancelling visits  If you can't come to your visit, please call us right away at [phone]. If you don't cancel at least 24 hours (1 full day) before your visit, that's \"late cancellation.\"  Not showing up for your visits  Being very late is the same as not showing up. You'll be a \"no show\" if:  You're more than 15 minutes late for a 30-minute (half hour) visit.  You're more than 30 minutes late for a 60-minute (full hour) visit.  If you cancel late or don't show up 2 times within 6 months, we may end your care.  Getting help between visits  If you need help between visits, you can call us Monday to Friday from 8 a.m. to 4:30 p.m. at [phone].  Emergency care  Call 911 or go to the nearest emergency department if your life or someone else's life is in danger.  Call 988 anytime to reach the national Suicide and Crisis hotline.  Medicine refills  To refill your medicine, call your pharmacy. You can also call [facility]'s [facility] at [phone], Monday to Friday, 8 a.m. to 4:30 p.m. It can take 1 to 3 business days to get a refill.   Forms, letters, and tests  You may have papers to fill out, like FMLA, short-term disability, and workability. We can help you with these forms at your visits, but you must have an " appointment. You may need more than 1 visit for this, to be in an intensive therapy program, or both.  Before we can give you medicine for ADHD, we may refer you to get tested for it or confirm it another way.  We may not be able to give you an emotional support animal letter.  We don't do mental health checks ordered by the court.   We don't do mental health testing, but we can refer you to get tested.   Thank you for choosing us for your care.  For informational purposes only. Not to replace the advice of your health care provider. Copyright   2022 [facility]. All rights reserved. [employer] [id number] - 12/22.     Plan:  1.Patient will take the medications as prescribed.   Medications: Zoloft 150 mg qam for depression  Risperdal 0.5 mg two times a day for mood   Cogentin 0.5 mg daily for involuntary movement   Aricept  10 mg every am for memory loss  Continue taking gabapentin 200 mg 3 times daily for anxiety and generalized body pain.   Patient will not stop taking medications or adjust them without consulting with the provider.  2.Staff will call with any problems between 2 visits.   3.detention staff will take the patient to the emergency room if not feeling safe  or unable to function in the community, or if suicidal, homicidal or hearing voices or having paranoia.   4. detention staff will watch his diet and exercise.   5.Patient will see non psychiatric providers for non psychiatric disorders.   6. Next appointment in 2 months  7.  staff will continue to encourage him to use a walker instead a wheelchair  8. I recommend that his sister becomes his gardian due to worsening cognitive abilities and TBI  9. Next appointment in 3 months

## 2024-04-17 NOTE — NURSING NOTE
Is the patient currently in the state of [place]? YES    Visit mode:VIDEO    If the visit is dropped, the patient can be reconnected by: VIDEO VISIT: Text to cell phone:   No relevant phone numbers on file.     [phone]  Catie helping with Tech  Will anyone else be joining the visit? NO  (If patient encounters technical issues they should call [phone] :[phone])    How would you like to obtain your AVS? MyChart    Are changes needed to the allergy or medication list? Pt stated no changes to allergies  Catie listed meds that patient is taking.  Also taking Refresh eye drops 4x day    Are refills needed on medications prescribed by this physician? NO    Reason for visit: RECHECK    Demetrice HERNANDEZF

## 2024-07-23 ENCOUNTER — VIRTUAL VISIT (OUTPATIENT)
Dept: PSYCHIATRY | Facility: CLINIC | Age: 69
End: 2024-07-23
Payer: COMMERCIAL

## 2024-07-23 DIAGNOSIS — F14.11 COCAINE ABUSE IN REMISSION (H): ICD-10-CM

## 2024-07-23 DIAGNOSIS — F11.11 OPIOID ABUSE, IN REMISSION (H): ICD-10-CM

## 2024-07-23 DIAGNOSIS — F02.80 DEMENTIA DUE TO HEAD TRAUMA WITHOUT BEHAVIORAL DISTURBANCE (H): ICD-10-CM

## 2024-07-23 DIAGNOSIS — S09.90XS DEMENTIA DUE TO HEAD TRAUMA WITHOUT BEHAVIORAL DISTURBANCE (H): ICD-10-CM

## 2024-07-23 DIAGNOSIS — F41.1 GAD (GENERALIZED ANXIETY DISORDER): Primary | ICD-10-CM

## 2024-07-23 DIAGNOSIS — F39 MOOD DISORDER (H): ICD-10-CM

## 2024-07-23 PROCEDURE — 99214 OFFICE O/P EST MOD 30 MIN: CPT | Mod: 95 | Performed by: NURSE PRACTITIONER

## 2024-07-23 NOTE — PROGRESS NOTES
"PSYCHIATRIC PROGRESS NOTE     Name:  Tavon Arias  : 1955    Tavon Arias is a 67 year old male who is being evaluated via a billable Video visit.      Telemedicine Visit: The patient's condition can be safely assessed and treated via synchronous audio and visual telemedicine encounter.      Reason for Telemedicine Visit: COVID 19 pandemic and the social and physical recommendations by the CDC and MD., Patient has requested telehealth visit and Patient unable to travel      Originating Site (Patient Location):  Group home    Distant Site (Provider Location): [facility] [place] [facility]    Consent:  The patient/guardian has verbally consented to: the potential risks and benefits of telemedicine (video visit or phone) versus in person care; bill my insurance or make self-payment for services provided; and responsibility for payment of non-covered services.     Mode of Communication:  [employer] platform     As the provider I attest to compliance with applicable laws and regulations related to telemedicine.     Date of Last Visit: 24                                      CHIEF COMPLAINT     \"I am doing well\"  HISTORY OF PRESENT ILLNESS     Patient who was last seen in the clinic on 24 returned today for follow up visit.  Patient who lives in a group home attended visit with the group home staff.  Patient reports everything still remained the same as mood, depression, anxiety, and sleep are well under control with current medication regimen.  Patient reported mood, anxiety and depression are well under control with current medication.  Patient's staff also confirmed that patient has been behaviorally appropriate with  no irritability since last visit.  Patient reports he participates in the group activities like playing bingo and going to the [employer] once a month with the staff.  Patient currently denies both suicidal or homicidal ideation.  He also denied both auditory " and visual hallucination.  Patient reports no nila or hypomania.  Patient to return to clinic in 3 months for follow-up.  PSYCHIATRIC HISTORY:   History of Psychiatric Hospitalizations:   - Inpatient:  Yes, most recently in 2006  - IOP/PHP/Day treatment: Denies  History of Suicidal Ideation:Accidental overdose on heroin in 2006 and he had anoxic brain  History of Suicide Attempts: Accidental overdose on heroin in 2006 and he had anoxic brain  History of Self-injurious Behavior: Denies a history of SIB.  Current:  No  History of Violence/Aggression: History of Verbal agression  History of Commitment? Denies  Electroconvulsive Therapy (ECT): Denies    PSYCHIATRIC REVIEW OF SYSTEMS:   Psychiatric Review of Systems:   Depression:   Reports: depressed mood, helplessness, irritability, low tolerance  Nila:   Denies: sleeplessness, increased goal-directed activities, abrupt increase in energy pressured speech  Psychosis:   Denies: visual hallucinations, auditory hallucinations, paranoia  Anxiety:   Reports: excessive worries that are difficult to control  PTSD:   Denies: re-experiencing past trauma, nightmares, increased arousal, avoidance of traumatic stimuli, impaired function.  Denies: re-experiencing past trauma, nightmares, increased arousal, avoidance of traumatic stimuli, impaired function.  OCD:   Denies: obsessions, checking, symmetry, cleaning, skin picking.  Eating Disorder:   Denies: restriction, binging, purging.    Sleep:   Denies: Sleep deprivation    MEDICATIONS                                                                                                Current Outpatient Medications   Medication Sig Dispense Refill    Acetaminophen (TYLENOL PO)       albuterol (PROAIR HFA, PROVENTIL HFA, VENTOLIN HFA) 108 (90 BASE) MCG/ACT inhaler Inhale 2 puffs into the lungs every 6 hours      ASPIRIN PO Take 81 mg by mouth daily       benztropine (COGENTIN) 0.5 MG tablet TAKE 1 TABLET BY MOUTH ONCE DAILY 31 tablet  11    BENZTROPINE MESYLATE PO Take 0.5 mg by mouth daily      bisacodyl (DULCOLAX) 5 MG EC tablet Take 5 mg by mouth daily as needed for constipation      calcium carbonate-vitamin D (OYSTER SHELL CALCIUM/D) 500-200 MG-UNIT tablet Take 1 tablet by mouth daily      calcium citrate-vitamin D (CITRACAL) 315-250 MG-UNIT TABS Take by mouth daily      Capsaicin 0.1 % cream APPLY A THIN LAYER TO SORE AREAS ON CHEST/BACK THREE TIMES DAILY AS NEEDED FOR PAIN      donepezil (ARICEPT) 10 MG tablet TAKE 1 TABLET BY MOUTH EVERY MORNING 31 tablet 11    gabapentin (NEURONTIN) 100 MG capsule TAKE 2 CAPSULES (200MG) BY MOUTH THREE TIMES DAILY  *1 TOTAL FILL* 186 capsule 11    Ipratropium-Albuterol (COMBIVENT RESPIMAT)  MCG/ACT inhaler Inhale 1 puff into the lungs 4 times daily      ketoconazole (NIZORAL) 2 % cream Apply topically daily      levETIRAcetam (KEPPRA) 100 MG/ML solution Take 10 mg/kg by mouth 2 times daily      LOSARTAN POTASSIUM PO Take 25 mg by mouth daily       magnesium citrate solution Take 296 mLs by mouth once      METFORMIN HCL PO Take 500 mg by mouth 2 times daily (with meals)       multivitamin w/minerals (THERA-VIT-M) tablet Take 1 tablet by mouth daily      OMEPRAZOLE PO Take 20 mg by mouth every morning       oxybutynin (DITROPAN-XL) 5 MG 24 hr tablet Take 5 mg by mouth 2 times daily       Pediatric Multivitamins-Fl (MULTIVITAMIN WITH 1 MG FLUORIDE) 1 MG CHEW Take 1 tablet by mouth daily      polyethylene glycol (MIRALAX/GLYCOLAX) powder Take 1 capful by mouth daily      psyllium (REGULOID) 58.6 % powder Take 6 g by mouth daily      risperiDONE (RISPERDAL) 0.5 MG tablet TAKE 1 TABLET BY MOUTH TWICE DAILY 62 tablet 11    rosuvastatin (CRESTOR) 10 MG tablet Take 5 mg by mouth      rosuvastatin (CRESTOR) 5 MG tablet Take 1 tablet by mouth daily      senna-docusate (SENOKOT-S;PERICOLACE) 8.6-50 MG per tablet Take 1 tablet by mouth daily      sertraline (ZOLOFT) 100 MG tablet TAKE 1 TABLET BY MOUTH ONCE  "DAILY ALONG WITH 50MG FOR TOTAL DOSE = 150MG 31 tablet 11    sertraline (ZOLOFT) 50 MG tablet TAKE 1 TABLET BY MOUTH ONCE DAILY ALONG WITH 100MG FOR TOTAL DOSE = 150MG 31 tablet 11    TRAMADOL HCL PO       triamcinolone (KENALOG) 0.025 % cream Apply topically 2 times daily       No current facility-administered medications for this visit.       DRUG MONITORING:  [place] Prescription Monitoring Program evaluating controlled substances in the last year in [place]:  The [place] Prescription Monitoring Program has been reviewed and there are no current concerns with: diversionary activity, early refill requests, and or obtaining the medication from multiple providers      PAST PSYCHOTROPIC MEDICATIONS:  Keppra, Trazodone, citalopam    VITALS   There were no vitals taken for this visit.     BP Readings from Last 1 Encounters:   10/15/19 116/73     Pulse Readings from Last 1 Encounters:   10/15/19 59     Wt Readings from Last 1 Encounters:   03/20/19 83.9 kg (185 lb)     Ht Readings from Last 1 Encounters:   10/15/19 1.778 m (5' 10\")     Estimated body mass index is 26.54 kg/m  as calculated from the following:    Height as of 10/15/19: 1.778 m (5' 10\").    Weight as of 3/20/19: 83.9 kg (185 lb).      PERTINENT HISTORY   PAST MEDICAL HISTORY:   Past Medical History:   Diagnosis Date    Anoxic brain injury (H)     Atrial fibrillation (H)     Cardiac arrest (H)     Cocaine abuse (H)     Cocaine abuse, in remission (H)     Created by Conversion     COPD (chronic obstructive pulmonary disease) (H)     Hepatitis A     Hypertension     Mood disorder (H24)     Opioid abuse, in remission (H)     Created by Conversion [facility] Annotation: Aug 11 2008  9:08HAIM - Trung Harden: HEROIN     Other and unspecified alcohol dependence, in remission     Created by Conversion     Type 2 diabetes mellitus without complications (H)        PAST SURGICAL HISTORY:   Past Surgical History:   Procedure Laterality Date    CHOLECYSTECTOMY      " "COLONOSCOPY  2014    Procedure: COLONOSCOPY;  Surgeon: Familia Bain MD;  Location:  GI    COLONOSCOPY N/A 3/29/2017    Procedure: COMBINED COLONOSCOPY, SINGLE OR MULTIPLE BIOPSY/POLYPECTOMY BY BIOPSY;  Surgeon: Cassie Martin MD;  Location:  GI    colostomy and takedown      ZZC PART REMOVAL COLON W END COLOSTOMY      Description: Partial Colectomy, End Colostomy & Distal Segment Closure;  Proc Date: 2003;  Comments: SIGMOID RESECTION AND CROSS POUCH FOR PERF DIVERTICULITIS       FAMILY HISTORY: No family history on file.    SOCIAL HISTORY:   Social History     Tobacco Use    Smoking status: Former     Current packs/day: 0.00     Types: Cigarettes     Quit date: 10/13/2014     Years since quittin.7     Passive exposure: Never    Smokeless tobacco: Former    Tobacco comments:     stopped smoking per staff   Substance Use Topics    Alcohol use: No         Seizures or Head Injury: Reports history of seizure and TBI  History of cardiac disease, rheumatic fever, fainting or dizziness, especially with exercise, seizures, chest pain or shortness of breath with exercise, unexplained change in exercise tolerance, palpitations, high blood pressure, or heart murmur?   No    LABS & IMAGING                                                                                                                  No lab results found.  No lab results found.  No lab results found.    No lab results found.  No results found for: \"ZUB091\", \"ZJCP835\", \"VAMB05GKMIG\", \"VITD3\", \"D2VIT\", \"D3VIT\", \"DTOT\", \"[id number]\", \"[id number]\", \"[id number]\", \"[id number]\", \"[id number]\", \"[id number]\"     ALLERGY & IMMUNIZATIONS       Allergies   Allergen Reactions    Adhesive Tape Swelling    Latex        FAMILY MEDICAL HISTORY:     Family History       No data available              Family history of sudden or unexplained death or an event requiring resuscitation in children or young adults, cardiac arrhythmias (eg, " Abbi-Parkinson-White syndrome), long QT syndrome, catecholaminergic paroxysmal ventricular tachycardia, Brugada syndrome, arrhythmogenic right ventricular dysplasia, hypertrophic cardiomyopathy, dilated cardiomyopathy, or Marfan syndrome?  No    FAMILY PSYCHIATRIC HISTORY:   Psychiatric: Questionable  Suicide attempt: Questionable  Chemical dependency: Positive  Diabetes: Positive    SIGNIFICANT SOCIAL/FAMILY HISTORY:                                           No abuse.    His parents  when he was 20 years old.    He was  and .    He has 2 children.  No contact.    He is on SSDI.  He lives in a group home.    He has conservator for financial decisions    LEGAL: Denies     SUBSTANCE USE HISTORY    Chemical dependency history: Patient had 6 chemical dependency treatments more than 10 years ago  Hx of polysubstance abuse - heroine, cocaine, Alcohol    MEDICAL REVIEW OF SYSTEMS:   Ten system review was completed with pertinent positives noted above    MENTAL STATUS EXAM:   Mental Status Examination (limited due to video virtual visit format):  General:  cooperative  Orientation: Oriented to self, completely disoriented in time and its chronic, partially oriented in place  Speech: Impaired articulation due to TBI  Language: normal naming  Thought process: [place]  Thought content: Denies hallucinations and delusions   Suicidal thoughts: Denies  Homicidal thoughts: Denies  Associations: connected   Affect: irritable due to headache   Mood: depressed   Intellectual functioning: Decreased due to TBI  Memory: Decreased due to TBI   Fund of knowledge: Decreased due to TBI   Attention and concentration: Seems to be at baseline  Gait: I cannot assess it because this is phone visit, but the staff says that he uses walker at home  Psychomotor activity: Mild agitation in his voice  Muscles: I cannot assess it because this is phone visit  InSight and judgment: Limited       SAFETY   Feels safe in home: Yes    Suicidal ideation: Denies  History of suicide attempts:  Yes   Hx of impulsivity: No     DSM 5 DIAGNOSIS:   1. 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 Alcohol abuse, in remission    5. Cocaine abuse in remission (H)    6. KERA (generalized anxiety disorder)      ASSESSMENT AND PLAN    Patient is a 67 year old,  White Not  or  male with a history of 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  Patient currently residing in an assisted living facility.  Patient reports symptoms stabilization on current medication regimen.  He has not exhibited agitation or irritability since last visit.  Staff confirmed patient has been behaviorally appropriate since last visit.  Patient engages in the facility activities like playing bingo and going to the casino once a month with staff.  Patient denies SI, SIB, and HI.  He also denied auditory or visual hallucination.  He reported no yogi or hypomania. The patient, staff and I reviewed the diagnosis and treatment plan. Reviewed risks/benefits of medication with patient. Ongoing education given regarding diagnostic and treatment options with adequate verbalization of understanding. RTC in 3 months    Patient agrees with following recommendations:     Plan:  1.Patient will take the medications as prescribed.   Medications: Zoloft 150 mg qam for depression  Risperdal 0.5 mg two times a day for mood   Cogentin 0.5 mg daily for involuntary movement   Aricept  10 mg every am for memory loss  Continue taking gabapentin 200 mg 3 times daily for anxiety and generalized body pain.   Patient will not stop taking medications or adjust them without consulting with the provider.  2.Staff  will call with any problems between 2 visits.   3.[facility] staff will take the patient to the emergency room if not feeling safe  or unable to function in the community, or if suicidal, homicidal or hearing voices or having paranoia.   4. [facility] staff will watch his diet and exercise.   5.Patient will see non psychiatric providers for non psychiatric disorders.   6. Next appointment in 2 months  7.  staff will continue to encourage him to use a walker instead a wheelchair  8. I recommend that his sister becomes his gardian due to worsening cognitive abilities and TBI  9. Next appointment in 3 months       CONSULTS/REFERRALS:   Continue therapy  None at this time  Coordinate care with therapist as needed    MEDICAL:   None at this time  Coordinate care with PCP (Trung Harden) as needed  Follow up with primary care provider as planned or for acute medical concerns.    PSYCHOEDUCATION:  Medication side effects and alternatives reviewed. Health promotion activities recommended and reviewed today. All questions addressed. Education and counseling completed regarding risks and benefits of medications and psychotherapy options.  Consent provided by patient/guardian  Call the psychiatric nurse line with medication questions or concerns at [phone].  Tapastreethart may be used to communicate with your provider, but this is not intended to be used for emergencies.  SEROTONIN SYNDROME:  Discussed risks of Serotonin syndrome (ie, serotonin toxicity) which is a potentially life-threatening condition associated with increased serotonergic activity in the central nervous system (CNS). It is seen with therapeutic medication use, inadvertent interactions between drugs, and intentional self-poisoning. Serotonin syndrome may involve a spectrum of clinical findings, which often include mental status changes, autonomic hyperactivity, and neuromuscular abnormalities.    FIRST GENERATION ANTIPSYCHOTIC/ SECOND GENERATION ANTIPSYCHOTIC  USE:  Atypical need for cardiometabolic monitoring with medication- B/P, weight, blood sugar, cholesterol.  Need to monitor for abnormal movements taught  SLEEP HYGIENE: establish a sleep routine, limit screen time 1 hour prior to bed, use bed for sleep only, take sleep/medications on time (including sleepy time tea, trazadone or herbal treatments such as melatonin), aroma therapy, limit caffeine/sugar, yoga, guided imagery, stretch, meditation, limit naps to 20 minutes, make a temperature change in the room, white noise, be mindful of slowing down breathing, take a warm bath/shower, frequently wash sheets, and journaling.   Medlineplus.gov is information for patients.  It is run by the [employer] and it contains information about all disorders, diseases and all medications.      COMMUNITY RESOURCES:    CRISIS NUMBERS: Provided in AVS 2023  National Suicide Prevention Lifeline: 9-035-016-TALK (836-403-3371)  Anywhere to Go/resources for a list of additional resources (SOS)            [facility] - [phone]   Urgent Care Adult Mental Fpimwz-[phone] mobile unit/  crisis line  [facility] -[phone]   COPE  [place] Mobile Team -[phone] (adults)/ [phone] (child)  Poison Control Center - 1-613.409.8916    OR  go to nearest ER  Crisis Text Line for any crisis  send this-   To: 901869   [facility]  [phone]  National Suicide Prevention Lifeline: 992.759.4578 (TTY: 134.742.4442). Call anytime for help.  (www.suicidepreventionlifeline.org)  [place] [employer] Mental Illness (www.bell.org): 564.942.5623 or 098-878-5567.   Mental Health Association (www.mentalhealth.org): 833.565.7925 or 258-851-7722.  [place] Crisis Text Line: Text [place] to 754000  Suicide LifeLine Chat: suicideLVL6.org/chat    ADMINISTRATIVE BILLIN min spent interviewing patient, reviewing referral documents,  obtaining and reviewing outside records, communication with other health specialists, and preparing this report this day 7/23/24    Video/Phone Start Time: 1043  Video/Phone End Time:  1050    Greater than 50% of time was spent in counseling and coordination of care regarding above diagnoses and treatment plan.    Patient Status:  Our psychiatry providers act as a specialty service for Primary Care Providers in the [facility] that seek to optimize medications for unstable patients.  Once medications have been optimized, our providers discharge the patient back to the referring Primary Care Provider for ongoing medication management.  This type of system allows our providers to serve a high volume of patients. At this time  Patient will continue to be seen for ongoing consultation and stabilization.    Signed:   Marcia Singleton, MSN, APRN, PMHNP-BC  Long Term Outpatient Psychiatry  Chart documentation done in part with Dragon Voice Recognition software.  Although reviewed after completion, some word and grammatical errors may remain.  Answers submitted by the patient for this visit:  Patient Health Questionnaire (Submitted on 1/17/2024)  If you checked off any problems, how difficult have these problems made it for you to do your work, take care of things at home, or get along with other people?: Not difficult at all  PHQ9 TOTAL SCORE: 0    Answers submitted by the patient for this visit:  Patient Health Questionnaire (Submitted on 4/17/2024)  If you checked off any problems, how difficult have these problems made it for you to do your work, take care of things at home, or get along with other people?: Not difficult at all  PHQ9 TOTAL SCORE: 1

## 2024-07-23 NOTE — PATIENT INSTRUCTIONS
"Patient Education   The Panel [facility] Program  What to Expect  Here's what to expect in the Panel Psychiatry Program.   About the program  You'll be meeting with a psychiatric doctor to check your mental health. A psychiatric doctor helps you deal with troubling thoughts and feelings by giving you medicine. They'll make sure you know the plan for your care. You may see them for a long time. When you're feeling better, they may refer you back to seeing your family doctor.   If you have any questions, we'll be glad to talk to you.  About visits  Be open  At your visits, please talk openly about your problems. It may feel hard, but it's the best way for us to help you.  Cancelling visits  If you can't come to your visit, please call us right away at [phone]. If you don't cancel at least 24 hours (1 full day) before your visit, that's \"late cancellation.\"  Not showing up for your visits  Being very late is the same as not showing up. You'll be a \"no show\" if:  You're more than 15 minutes late for a 30-minute (half hour) visit.  You're more than 30 minutes late for a 60-minute (full hour) visit.  If you cancel late or don't show up 2 times within 6 months, we may end your care.  Getting help between visits  If you need help between visits, you can call us Monday to Friday from 8 a.m. to 4:30 p.m. at [phone].  Emergency care  Call 911 or go to the nearest emergency department if your life or someone else's life is in danger.  Call 988 anytime to reach the national Suicide and Crisis hotline.  Medicine refills  To refill your medicine, call your pharmacy. You can also call [facility]'s Behavioral Access at [phone], Monday to Friday, 8 a.m. to 4:30 p.m. It can take 1 to 3 business days to get a refill.   Forms, letters, and tests  You may have papers to fill out, like FMLA, short-term disability, and workability. We can help you with these forms at your visits, but you must have an " appointment. You may need more than 1 visit for this, to be in an intensive therapy program, or both.  Before we can give you medicine for ADHD, we may refer you to get tested for it or confirm it another way.  We may not be able to give you an emotional support animal letter.  We don't do mental health checks ordered by the court.   We don't do mental health testing, but we can refer you to get tested.   Thank you for choosing us for your care.  For informational purposes only. Not to replace the advice of your health care provider. Copyright   2022 [facility]. All rights reserved. [employer] [id number] - 12/22.     Plan:  1.Patient will take the medications as prescribed.   Medications: Zoloft 150 mg qam for depression  Risperdal 0.5 mg two times a day for mood   Cogentin 0.5 mg daily for involuntary movement   Aricept  10 mg every am for memory loss  Continue taking gabapentin 200 mg 3 times daily for anxiety and generalized body pain.   Patient will not stop taking medications or adjust them without consulting with the provider.  2.Staff will call with any problems between 2 visits.   3.detention staff will take the patient to the emergency room if not feeling safe  or unable to function in the community, or if suicidal, homicidal or hearing voices or having paranoia.   4. detention staff will watch his diet and exercise.   5.Patient will see non psychiatric providers for non psychiatric disorders.   6. Next appointment in 2 months  7.  staff will continue to encourage him to use a walker instead a wheelchair  8. I recommend that his sister becomes his gardian due to worsening cognitive abilities and TBI  9. Next appointment in 3 months

## 2024-07-23 NOTE — PROGRESS NOTES
Virtual Visit Details    Type of service:  Video Visit   Video Start Time:  1043  Video End Time: 1050    Originating Location (pt. Location): Assisted Facility    Distant Location (provider location):  Off-site  Platform used for Video Visit: [employer]

## 2024-07-23 NOTE — NURSING NOTE
Current patient location: [address]  [facility] [address]    Is the patient currently in the state of [place]? YES    Visit mode:VIDEO    If the visit is dropped, the patient can be reconnected by: VIDEO VISIT: Text to cell phone:   Telephone Information:   Mobile [phone]   Mobile Not on file.       Will anyone else be joining the visit? NO  (If patient encounters technical issues they should call [phone]    How would you like to obtain your AVS? MyChart    Are changes needed to the allergy or medication list? No    Are refills needed on medications prescribed by this physician? NO    Reason for visit: RECHECK      Qnrs incomplete due to time, and pt not present during check in.    Fabiola LACEY

## 2024-10-15 ENCOUNTER — VIRTUAL VISIT (OUTPATIENT)
Dept: PSYCHIATRY | Facility: CLINIC | Age: 69
End: 2024-10-15
Payer: COMMERCIAL

## 2024-10-15 DIAGNOSIS — F39 MOOD DISORDER (H): Primary | ICD-10-CM

## 2024-10-15 DIAGNOSIS — F11.11 OPIOID ABUSE, IN REMISSION (H): ICD-10-CM

## 2024-10-15 DIAGNOSIS — F02.80 DEMENTIA DUE TO HEAD TRAUMA WITHOUT BEHAVIORAL DISTURBANCE (H): ICD-10-CM

## 2024-10-15 DIAGNOSIS — F14.11 COCAINE ABUSE IN REMISSION (H): ICD-10-CM

## 2024-10-15 DIAGNOSIS — F02.818: ICD-10-CM

## 2024-10-15 DIAGNOSIS — S09.90XS DEMENTIA DUE TO HEAD TRAUMA WITHOUT BEHAVIORAL DISTURBANCE (H): ICD-10-CM

## 2024-10-15 PROCEDURE — 99214 OFFICE O/P EST MOD 30 MIN: CPT | Mod: 95 | Performed by: NURSE PRACTITIONER

## 2024-10-15 NOTE — PATIENT INSTRUCTIONS
"Patient Education   The Panel Psychiatry Program  What to Expect  Here's what to expect in the Panel Psychiatry Program.   About the program  You'll be meeting with a psychiatric doctor to check your mental health. A psychiatric doctor helps you deal with troubling thoughts and feelings by giving you medicine. They'll make sure you know the plan for your care. You may see them for a long time. When you're feeling better, they may refer you back to seeing your family doctor.   If you have any questions, we'll be glad to talk to you.  About visits  Be open  At your visits, please talk openly about your problems. It may feel hard, but it's the best way for us to help you.  Cancelling visits  If you can't come to your visit, please call us right away at [phone]. If you don't cancel at least 24 hours (1 full day) before your visit, that's \"late cancellation.\"  Not showing up for your visits  Being very late is the same as not showing up. You'll be a \"no show\" if:  You're more than 15 minutes late for a 30-minute (half hour) visit.  You're more than 30 minutes late for a 60-minute (full hour) visit.  If you cancel late or don't show up 2 times within 6 months, we may end your care.  Getting help between visits  If you need help between visits, you can call us Monday to Friday from 8 a.m. to 4:30 p.m. at [phone].  Emergency care  Call 911 or go to the nearest emergency department if your life or someone else's life is in danger.  Call 988 anytime to reach the national Suicide and Crisis hotline.  Medicine refills  To refill your medicine, call your pharmacy. You can also call [facility]'s Behavioral Access at [phone], Monday to Friday, 8 a.m. to 4:30 p.m. It can take 1 to 3 business days to get a refill.   Forms, letters, and tests  You may have papers to fill out, like FMLA, short-term disability, and workability. We can help you with these forms at your visits, but you must have an " appointment. You may need more than 1 visit for this, to be in an intensive therapy program, or both.  Before we can give you medicine for ADHD, we may refer you to get tested for it or confirm it another way.  We may not be able to give you an emotional support animal letter.  We don't do mental health checks ordered by the court.   We don't do mental health testing, but we can refer you to get tested.   Thank you for choosing us for your care.  For informational purposes only. Not to replace the advice of your health care provider. Copyright   2022 [facility]. All rights reserved. [employer] [id number] - 12/22.     Plan:  1.Patient will take the medications as prescribed.   Medications: Zoloft 150 mg qam for depression  Risperdal 0.5 mg two times a day for mood   Cogentin 0.5 mg daily for involuntary movement   Aricept  10 mg every am for memory loss  Continue taking gabapentin 200 mg 3 times daily for anxiety and generalized body pain.   Patient will not stop taking medications or adjust them without consulting with the provider.  2.Staff will call with any problems between 2 visits.   3.penitentiary staff will take the patient to the emergency room if not feeling safe  or unable to function in the community, or if suicidal, homicidal or hearing voices or having paranoia.   4. penitentiary staff will watch his diet and exercise.   5.Patient will see non psychiatric providers for non psychiatric disorders.   6. Next appointment in 2 months  7.  staff will continue to encourage him to use a walker instead a wheelchair  8. I recommend that his sister becomes his gardian due to worsening cognitive abilities and TBI  9. Next appointment in 3 months

## 2024-10-15 NOTE — NURSING NOTE
Current patient location: [address] [facility] [address]    Is the patient currently in the state of [place]? YES    Visit mode:VIDEO    If the visit is dropped, the patient can be reconnected by: VIDEO VISIT: Text to cell phone:   Telephone Information:   Mobile [phone]   Mobile Not on file.       Will anyone else be joining the visit? NO  (If patient encounters technical issues they should call [phone]    Are changes needed to the allergy or medication list? Pt stated no changes to allergies and Pt stated no med changes    Are refills needed on medications prescribed by this physician? Discuss with provider    Rooming Documentation:  Pt disabled-unable to do    Reason for visit: RECHVIK HERNANDEZF

## 2024-10-15 NOTE — PROGRESS NOTES
"PSYCHIATRIC PROGRESS NOTE     Name:  Tavon Arias  : 1955    Tavon Arias is a 67 year old male who is being evaluated via a billable Video visit.      Telemedicine Visit: The patient's condition can be safely assessed and treated via synchronous audio and visual telemedicine encounter.      Reason for Telemedicine Visit: COVID 19 pandemic and the social and physical recommendations by the CDC and MD., Patient has requested telehealth visit and Patient unable to travel      Originating Site (Patient Location):  Group home    Distant Site (Provider Location): [facility] Outpatient Setting: [facility]    Consent:  The patient/guardian has verbally consented to: the potential risks and benefits of telemedicine (video visit or phone) versus in person care; bill my insurance or make self-payment for services provided; and responsibility for payment of non-covered services.     Mode of Communication:  Nanoledge platform     As the provider I attest to compliance with applicable laws and regulations related to telemedicine.     Date of Last Visit: 24                                      CHIEF COMPLAINT     \"I am doing well\"  HISTORY OF PRESENT ILLNESS     Patient who was last seen in the clinic on 24 returned today for follow up visit.  Patient who lives in a group home attended visit with the group home staff.  Patient reports everything still remained the same as mood, depression, anxiety, and sleep are well under control with current medication regimen.  Patient reported mood, anxiety and depression are well under control with current medication.  Staff report patient still remained the same as last time.  Patient's staff also confirmed that patient has been behaviorally appropriate with  no irritability since last visit.  Patient reports he does participate in the group activities like playing bingo and going to the [employer] once a month with the staff.  Patient currently denies both " suicidal or homicidal ideation.  He also denied both auditory and visual hallucination.  Patient reports no nila or hypomania.  Patient to return to clinic in 3 months for follow-up.  PSYCHIATRIC HISTORY:   History of Psychiatric Hospitalizations:   - Inpatient:  Yes, most recently in 2006  - IOP/PHP/Day treatment: Denies  History of Suicidal Ideation:Accidental overdose on heroin in 2006 and he had anoxic brain  History of Suicide Attempts: Accidental overdose on heroin in 2006 and he had anoxic brain  History of Self-injurious Behavior: Denies a history of SIB.  Current:  No  History of Violence/Aggression: History of Verbal agression  History of Commitment? Denies  Electroconvulsive Therapy (ECT): Denies    PSYCHIATRIC REVIEW OF SYSTEMS:   Psychiatric Review of Systems:   Depression:   Reports: depressed mood, helplessness, irritability, low tolerance  Nila:   Denies: sleeplessness, increased goal-directed activities, abrupt increase in energy pressured speech  Psychosis:   Denies: visual hallucinations, auditory hallucinations, paranoia  Anxiety:   Reports: excessive worries that are difficult to control  PTSD:   Denies: re-experiencing past trauma, nightmares, increased arousal, avoidance of traumatic stimuli, impaired function.  Denies: re-experiencing past trauma, nightmares, increased arousal, avoidance of traumatic stimuli, impaired function.  OCD:   Denies: obsessions, checking, symmetry, cleaning, skin picking.  Eating Disorder:   Denies: restriction, binging, purging.    Sleep:   Denies: Sleep deprivation    MEDICATIONS                                                                                                Current Outpatient Medications   Medication Sig Dispense Refill    gabapentin (NEURONTIN) 100 MG capsule TAKE 2 CAPSULES (200MG) BY MOUTH THREE TIMES DAILY  *1 TOTAL FILL* 186 capsule 11    risperiDONE (RISPERDAL) 0.5 MG tablet TAKE 1 TABLET BY MOUTH TWICE DAILY 62 tablet 11    sertraline  (ZOLOFT) 100 MG tablet TAKE 1 TABLET BY MOUTH ONCE DAILY ALONG WITH 50MG FOR TOTAL DOSE = 150MG 31 tablet 11    sertraline (ZOLOFT) 50 MG tablet TAKE 1 TABLET BY MOUTH ONCE DAILY ALONG WITH 100MG FOR TOTAL DOSE = 150MG 31 tablet 11    Acetaminophen (TYLENOL PO)       albuterol (PROAIR HFA, PROVENTIL HFA, VENTOLIN HFA) 108 (90 BASE) MCG/ACT inhaler Inhale 2 puffs into the lungs every 6 hours      ASPIRIN PO Take 81 mg by mouth daily       benztropine (COGENTIN) 0.5 MG tablet TAKE 1 TABLET BY MOUTH ONCE DAILY 31 tablet 11    BENZTROPINE MESYLATE PO Take 0.5 mg by mouth daily      bisacodyl (DULCOLAX) 5 MG EC tablet Take 5 mg by mouth daily as needed for constipation      calcium carbonate-vitamin D (OYSTER SHELL CALCIUM/D) 500-200 MG-UNIT tablet Take 1 tablet by mouth daily      calcium citrate-vitamin D (CITRACAL) 315-250 MG-UNIT TABS Take by mouth daily      Capsaicin 0.1 % cream APPLY A THIN LAYER TO SORE AREAS ON CHEST/BACK THREE TIMES DAILY AS NEEDED FOR PAIN      donepezil (ARICEPT) 10 MG tablet TAKE 1 TABLET BY MOUTH EVERY MORNING 31 tablet 11    Ipratropium-Albuterol (COMBIVENT RESPIMAT)  MCG/ACT inhaler Inhale 1 puff into the lungs 4 times daily      ketoconazole (NIZORAL) 2 % cream Apply topically daily      levETIRAcetam (KEPPRA) 100 MG/ML solution Take 10 mg/kg by mouth 2 times daily      LOSARTAN POTASSIUM PO Take 25 mg by mouth daily       magnesium citrate solution Take 296 mLs by mouth once      METFORMIN HCL PO Take 500 mg by mouth 2 times daily (with meals)       multivitamin w/minerals (THERA-VIT-M) tablet Take 1 tablet by mouth daily      OMEPRAZOLE PO Take 20 mg by mouth every morning       oxybutynin (DITROPAN-XL) 5 MG 24 hr tablet Take 5 mg by mouth 2 times daily       Pediatric Multivitamins-Fl (MULTIVITAMIN WITH 1 MG FLUORIDE) 1 MG CHEW Take 1 tablet by mouth daily      polyethylene glycol (MIRALAX/GLYCOLAX) powder Take 1 capful by mouth daily      psyllium (REGULOID) 58.6 % powder Take  "6 g by mouth daily      rosuvastatin (CRESTOR) 10 MG tablet Take 5 mg by mouth      rosuvastatin (CRESTOR) 5 MG tablet Take 1 tablet by mouth daily      senna-docusate (SENOKOT-S;PERICOLACE) 8.6-50 MG per tablet Take 1 tablet by mouth daily      TRAMADOL HCL PO       triamcinolone (KENALOG) 0.025 % cream Apply topically 2 times daily       No current facility-administered medications for this visit.       DRUG MONITORING:  [place] Prescription Monitoring Program evaluating controlled substances in the last year in [place]:  The [place] Prescription Monitoring Program has been reviewed and there are no current concerns with: diversionary activity, early refill requests, and or obtaining the medication from multiple providers      PAST PSYCHOTROPIC MEDICATIONS:  Keppra, Trazodone, citalopam    VITALS   There were no vitals taken for this visit.     BP Readings from Last 1 Encounters:   10/15/19 116/73     Pulse Readings from Last 1 Encounters:   10/15/19 59     Wt Readings from Last 1 Encounters:   03/20/19 83.9 kg (185 lb)     Ht Readings from Last 1 Encounters:   10/15/19 1.778 m (5' 10\")     Estimated body mass index is 26.54 kg/m  as calculated from the following:    Height as of 10/15/19: 1.778 m (5' 10\").    Weight as of 3/20/19: 83.9 kg (185 lb).      PERTINENT HISTORY   PAST MEDICAL HISTORY:   Past Medical History:   Diagnosis Date    Anoxic brain injury (H)     Atrial fibrillation (H)     Cardiac arrest (H)     Cocaine abuse (H)     Cocaine abuse, in remission (H)     Created by Conversion     COPD (chronic obstructive pulmonary disease) (H)     Hepatitis A     Hypertension     Mood disorder (H)     Opioid abuse, in remission (H)     Created by Conversion [facility] Annotation: Aug 11 2008  9:08AM - Trung Harden: HEROIN     Other and unspecified alcohol dependence, in remission     Created by Conversion     Type 2 diabetes mellitus without complications (H)        PAST SURGICAL HISTORY:   Past Surgical " "History:   Procedure Laterality Date    CHOLECYSTECTOMY      COLONOSCOPY  4/22/2014    Procedure: COLONOSCOPY;  Surgeon: Familia Bain MD;  Location:  GI    COLONOSCOPY N/A 3/29/2017    Procedure: COMBINED COLONOSCOPY, SINGLE OR MULTIPLE BIOPSY/POLYPECTOMY BY BIOPSY;  Surgeon: Cassie Martin MD;  Location:  GI    colostomy and takedown      ZZC PART REMOVAL COLON W END COLOSTOMY      Description: Partial Colectomy, End Colostomy & Distal Segment Closure;  Proc Date: 09/27/2003;  Comments: SIGMOID RESECTION AND CROSS POUCH FOR PERF DIVERTICULITIS       FAMILY HISTORY: No family history on file.    SOCIAL HISTORY:   Social History     Tobacco Use    Smoking status: Every Day     Current packs/day: 0.00     Types: Cigarettes     Last attempt to quit: 10/13/2014     Years since quitting: 10.0     Passive exposure: Never    Smokeless tobacco: Former    Tobacco comments:     stopped smoking per staff   Substance Use Topics    Alcohol use: No         Seizures or Head Injury: Reports history of seizure and TBI  History of cardiac disease, rheumatic fever, fainting or dizziness, especially with exercise, seizures, chest pain or shortness of breath with exercise, unexplained change in exercise tolerance, palpitations, high blood pressure, or heart murmur?   No    LABS & IMAGING                                                                                                                  No lab results found.  No lab results found.  No lab results found.    No lab results found.  No results found for: \"HCU528\", \"OQLK073\", \"VLNC87RSSAH\", \"VITD3\", \"D2VIT\", \"D3VIT\", \"DTOT\", \"[id number]\", \"[id number]\", \"[id number]\", \"[id number]\", \"[id number]\", \"[id number]\"     ALLERGY & IMMUNIZATIONS       Allergies   Allergen Reactions    Adhesive Tape Swelling    Latex        FAMILY MEDICAL HISTORY:     Family History       No data available              Family history of sudden or unexplained death or an event requiring " resuscitation in children or young adults, cardiac arrhythmias (eg, Abbi-Parkinson-White syndrome), long QT syndrome, catecholaminergic paroxysmal ventricular tachycardia, Brugada syndrome, arrhythmogenic right ventricular dysplasia, hypertrophic cardiomyopathy, dilated cardiomyopathy, or Marfan syndrome?  No    FAMILY PSYCHIATRIC HISTORY:   Psychiatric: Questionable  Suicide attempt: Questionable  Chemical dependency: Positive  Diabetes: Positive    SIGNIFICANT SOCIAL/FAMILY HISTORY:                                           No abuse.    His parents  when he was 20 years old.    He was  and .    He has 2 children.  No contact.    He is on SSDI.  He lives in a group home.    He has conservator for financial decisions    LEGAL: Denies     SUBSTANCE USE HISTORY    Chemical dependency history: Patient had 6 chemical dependency treatments more than 10 years ago  Hx of polysubstance abuse - heroine, cocaine, Alcohol    MEDICAL REVIEW OF SYSTEMS:   Ten system review was completed with pertinent positives noted above    MENTAL STATUS EXAM:   Mental Status Examination (limited due to video virtual visit format):  General:  cooperative  Orientation: Oriented to self, completely disoriented in time and its chronic, partially oriented in place  Speech: Impaired articulation due to TBI  Language: normal naming  Thought process: Deming  Thought content: Denies hallucinations and delusions   Suicidal thoughts: Denies  Homicidal thoughts: Denies  Associations: connected   Affect: irritable due to headache   Mood: depressed   Intellectual functioning: Decreased due to TBI  Memory: Decreased due to TBI   Fund of knowledge: Decreased due to TBI   Attention and concentration: Seems to be at baseline  Gait: I cannot assess it because this is phone visit, but the staff says that he uses walker at home  Psychomotor activity: Mild agitation in his voice  Muscles: I cannot assess it because this is phone  visit  InSight and judgment: Limited       SAFETY   Feels safe in home: Yes   Suicidal ideation: Denies  History of suicide attempts:  Yes   Hx of impulsivity: No     DSM 5 DIAGNOSIS:   1. 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 Alcohol abuse, in remission    5. Cocaine abuse in remission (H)    6. KERA (generalized anxiety disorder)      ASSESSMENT AND PLAN    Patient is a 67 year old,  White Not  or  male with a history of 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  Patient currently residing in an assisted living facility.  Patient reports symptoms stabilization on current medication regimen.  He has not exhibited agitation or irritability since last visit.  Staff confirmed patient has been behaviorally appropriate since last visit.  Patient engages in the facility activities like playing bingo and going to the casino once a month with staff.  Patient denies SI, SIB, and HI.  He also denied auditory or visual hallucination.  He reported no yogi or hypomania. The patient, staff and I reviewed the diagnosis and treatment plan. Reviewed risks/benefits of medication with patient. Ongoing education given regarding diagnostic and treatment options with adequate verbalization of understanding. RTC in 3 months    Patient agrees with following recommendations:     Plan:  1.Patient will take the medications as prescribed.   Medications: Zoloft 150 mg qam for depression  Risperdal 0.5 mg two times a day for mood   Cogentin 0.5 mg daily for involuntary movement   Aricept  10 mg every am for memory loss  Continue taking gabapentin 200 mg 3 times daily for anxiety and generalized body pain.   Patient will not stop taking  medications or adjust them without consulting with the provider.  2.Staff will call with any problems between 2 visits.   3.[facility] staff will take the patient to the emergency room if not feeling safe  or unable to function in the community, or if suicidal, homicidal or hearing voices or having paranoia.   4. [facility] staff will watch his diet and exercise.   5.Patient will see non psychiatric providers for non psychiatric disorders.   6. Next appointment in 2 months  7.  staff will continue to encourage him to use a walker instead a wheelchair  8. I recommend that his sister becomes his gardian due to worsening cognitive abilities and TBI  9. Next appointment in 3 months       CONSULTS/REFERRALS:   Continue therapy  None at this time  Coordinate care with therapist as needed    MEDICAL:   None at this time  Coordinate care with PCP (Trung Harden) as needed  Follow up with primary care provider as planned or for acute medical concerns.    PSYCHOEDUCATION:  Medication side effects and alternatives reviewed. Health promotion activities recommended and reviewed today. All questions addressed. Education and counseling completed regarding risks and benefits of medications and psychotherapy options.  Consent provided by patient/guardian  Call the psychiatric nurse line with medication questions or concerns at [phone].  ADTELLIGENCEhart may be used to communicate with your provider, but this is not intended to be used for emergencies.  SEROTONIN SYNDROME:  Discussed risks of Serotonin syndrome (ie, serotonin toxicity) which is a potentially life-threatening condition associated with increased serotonergic activity in the central nervous system (CNS). It is seen with therapeutic medication use, inadvertent interactions between drugs, and intentional self-poisoning. Serotonin syndrome may involve a spectrum of clinical findings, which often include mental status changes, autonomic hyperactivity, and neuromuscular  abnormalities.    FIRST GENERATION ANTIPSYCHOTIC/ SECOND GENERATION ANTIPSYCHOTIC USE:  Atypical need for cardiometabolic monitoring with medication- B/P, weight, blood sugar, cholesterol.  Need to monitor for abnormal movements taught  SLEEP HYGIENE: establish a sleep routine, limit screen time 1 hour prior to bed, use bed for sleep only, take sleep/medications on time (including sleepy time tea, trazadone or herbal treatments such as melatonin), aroma therapy, limit caffeine/sugar, yoga, guided imagery, stretch, meditation, limit naps to 20 minutes, make a temperature change in the room, white noise, be mindful of slowing down breathing, take a warm bath/shower, frequently wash sheets, and journaling.   Medlineplus.gov is information for patients.  It is run by the [employer] and it contains information about all disorders, diseases and all medications.      COMMUNITY RESOURCES:    CRISIS NUMBERS: Provided in AVS 4/18/2023  National Suicide Prevention Lifeline: 6-730-961-TALK (319-271-6273)  gripNote/resources for a list of additional resources (SOS)            [facility] - [phone]   Urgent Care Adult Mental Ssoelp-[phone] mobile unit/ 24/7 crisis line  [facility] -[phone]   COPE 24/7 [place] Mobile Team -[phone] (adults)/ [phone] (child)  Poison Control Center - 1-166.382.9412    OR  go to nearest ER  Crisis Text Line for any crisis 24/7 send this-   To: 380612   [facility] ([facility]  [phone]  National Suicide Prevention Lifeline: 699.932.6158 (TTY: 691.390.4865). Call anytime for help.  (www.suicidepreventionlifeline.org)  National [place] on Mental Illness (www.bell.org): 988.715.2938 or 410-386-1484.   Mental Health Association (www.mentalhealth.org): 341.964.4646 or 643-730-4627.  [place] Crisis Text Line: Text [place] to 319233  Suicide LifeLine Chat:  suicidepreventionBuchanan General Hospitalline.org/chat    ADMINISTRATIVE BILLIN min spent interviewing patient, reviewing referral documents, obtaining and reviewing outside records, communication with other health specialists, and preparing this report this day 10/15/24    Video/Phone Start Time: 1033  Video/Phone End Time:  1048    Greater than 50% of time was spent in counseling and coordination of care regarding above diagnoses and treatment plan.    Patient Status:  Our psychiatry providers act as a specialty service for Primary Care Providers in the [facility] that seek to optimize medications for unstable patients.  Once medications have been optimized, our providers discharge the patient back to the referring Primary Care Provider for ongoing medication management.  This type of system allows our providers to serve a high volume of patients. At this time  Patient will continue to be seen for ongoing consultation and stabilization.    Signed:   Marcia Singleton, MSN, APRN, PMHNP-BC  Long Term Outpatient Psychiatry  Chart documentation done in part with Dragon Voice Recognition software.  Although reviewed after completion, some word and grammatical errors may remain.  Answers submitted by the patient for this visit:  Patient Health Questionnaire (Submitted on 2024)  If you checked off any problems, how difficult have these problems made it for you to do your work, take care of things at home, or get along with other people?: Not difficult at all  PHQ9 TOTAL SCORE: 0    Answers submitted by the patient for this visit:  Patient Health Questionnaire (Submitted on 2024)  If you checked off any problems, how difficult have these problems made it for you to do your work, take care of things at home, or get along with other people?: Not difficult at all  PHQ9 TOTAL SCORE: 1

## 2024-10-15 NOTE — PROGRESS NOTES
Virtual Visit Details    Type of service:  Video Visit   Video/Phone Start Time: 1033  Video/Phone End Time:  1048    Originating Location (pt. Location): Assisted Living    Distant Location (provider location):  Off-site  Platform used for Video Visit: Adrian

## 2024-10-19 ENCOUNTER — HEALTH MAINTENANCE LETTER (OUTPATIENT)
Age: 69
End: 2024-10-19

## 2024-10-22 DIAGNOSIS — F02.80 DEMENTIA DUE TO HEAD TRAUMA WITHOUT BEHAVIORAL DISTURBANCE (H): ICD-10-CM

## 2024-10-22 DIAGNOSIS — F06.30 MOOD DISORDER DUE TO OLD HEAD INJURY: ICD-10-CM

## 2024-10-22 DIAGNOSIS — R29.818 EXTRAPYRAMIDAL SYMPTOM: ICD-10-CM

## 2024-10-22 DIAGNOSIS — S09.90XS DEMENTIA DUE TO HEAD TRAUMA WITHOUT BEHAVIORAL DISTURBANCE (H): ICD-10-CM

## 2024-10-22 DIAGNOSIS — S09.90XS MOOD DISORDER DUE TO OLD HEAD INJURY: ICD-10-CM

## 2024-10-23 RX ORDER — SERTRALINE HYDROCHLORIDE 100 MG/1
TABLET, FILM COATED ORAL
Qty: 30 TABLET | Refills: 11 | Status: SHIPPED | OUTPATIENT
Start: 2024-10-23

## 2024-10-23 RX ORDER — RISPERIDONE 0.5 MG/1
TABLET ORAL
Qty: 60 TABLET | Refills: 11 | Status: SHIPPED | OUTPATIENT
Start: 2024-10-23

## 2024-10-23 RX ORDER — DONEPEZIL HYDROCHLORIDE 10 MG/1
TABLET, FILM COATED ORAL
Qty: 30 TABLET | Refills: 11 | Status: SHIPPED | OUTPATIENT
Start: 2024-10-23

## 2024-10-23 RX ORDER — BENZTROPINE MESYLATE 0.5 MG/1
TABLET ORAL
Qty: 30 TABLET | Refills: 11 | Status: SHIPPED | OUTPATIENT
Start: 2024-10-23

## 2024-10-23 NOTE — TELEPHONE ENCOUNTER
Date of Last Office Visit: 10/15/24  Date of Next Office Visit:  01 /07/25  No shows since last visit: No  More than one patient-initiated cancellation (with reschedule) since last seen in clinic? No    []Medication refilled per  Medication Refill in Ambulatory Care  policy.  [x]Medication unable to be refilled by RN due to criteria not met as indicated below:    []Eligibility: has not had a provider visit within last 6 months   []Supervision: no future appointment; < 7 days before next appointment   []Compliance: no shows; cancellations; lapse in therapy   []Verification: order discrepancy; may need modification...   [x] > 30-day supply request   []Advanced refill request: > 7 days before refill date   []Controlled medication   []Medication not included in policy   []Review: new med; med adjusted <= 30 days; safety alert; requires lab monitoring...   []Scope of Practice: refill request processed by LPN/MA   []Other:  These were all written October of 2023 for 1 month with 11 refills.  Requesting the same 1 year supply.    Medication(s) requested:     -  benztropine (COGENTIN) 0.5 MG tablet   Date last ordered: 10/19/23  Qty: 31  Refills: 11  Appropriate for refill? Provider to review.      -  donepezil (ARICEPT) 10 MG tablet   Date last ordered: 10/19/23  Qty: 31  Refills: 11  Appropriate for refill? Provider to review.      -  risperiDONE (RISPERDAL) 0.5 MG tablet   Date last ordered: 10/19/23  Qty: 62  Refills: 11  Appropriate for refill? Provider to review.      -  Sertraline (ZOLOFT) 100 MG tablet   Date last ordered: 10/18/23  Qty: 31  Refills: 11  Appropriate for refill? Provider to review.     -  Sertraline (ZOLOFT) 50 MG tablet   Date last ordered: 10/18/23  Qty: 31  Refills: 11  Appropriate for refill? Provider to review.       Any Controlled Substance(s)? No      Requested medication(s) verified as identical to current order? No: refills different on some    Any lapse in adherence to medication(s) greater  than 5 days? Unknown     Additional action taken? routed encounter to provider for review.      Last visit treatment plan:   Plan:  1.Patient will take the medications as prescribed.   Medications: Zoloft 150 mg qam for depression  Risperdal 0.5 mg two times a day for mood   Cogentin 0.5 mg daily for involuntary movement   Aricept  10 mg every am for memory loss  Continue taking gabapentin 200 mg 3 times daily for anxiety and generalized body pain.   Patient will not stop taking medications or adjust them without consulting with the provider.  2.Staff will call with any problems between 2 visits.   3.[facility] staff will take the patient to the emergency room if not feeling safe  or unable to function in the community, or if suicidal, homicidal or hearing voices or having paranoia.   4. [facility] staff will watch his diet and exercise.   5.Patient will see non psychiatric providers for non psychiatric disorders.   6. Next appointment in 2 months  7.  staff will continue to encourage him to use a walker instead a wheelchair  8. I recommend that his sister becomes his gardian due to worsening cognitive abilities and TBI  9. Next appointment in 3 months       Any medication(s) require lab monitoring? No

## 2025-01-07 ENCOUNTER — VIRTUAL VISIT (OUTPATIENT)
Dept: PSYCHIATRY | Facility: CLINIC | Age: 70
End: 2025-01-07
Payer: COMMERCIAL

## 2025-01-07 DIAGNOSIS — F39 MOOD DISORDER: Primary | ICD-10-CM

## 2025-01-07 DIAGNOSIS — R29.818 EXTRAPYRAMIDAL SYMPTOM: ICD-10-CM

## 2025-01-07 DIAGNOSIS — F41.1 GAD (GENERALIZED ANXIETY DISORDER): ICD-10-CM

## 2025-01-07 RX ORDER — TRAZODONE HYDROCHLORIDE 50 MG/1
50-100 TABLET, FILM COATED ORAL
COMMUNITY

## 2025-01-07 RX ORDER — BENZTROPINE MESYLATE 0.5 MG/1
TABLET ORAL
Qty: 21 TABLET | Refills: 0 | Status: SHIPPED | OUTPATIENT
Start: 2025-01-07

## 2025-01-07 ASSESSMENT — PAIN SCALES - GENERAL: PAINLEVEL_OUTOF10: MODERATE PAIN (5)

## 2025-01-07 NOTE — PROGRESS NOTES
Virtual Visit Details    Type of service:  Video Visit   Video/Phone Start Time: 1105  Video/Phone End Time:  1114    Originating Location (pt. Location): Assisted Living    Distant Location (provider location):  Off-site  Platform used for Video Visit: Adrian

## 2025-01-07 NOTE — PROGRESS NOTES
"PSYCHIATRIC PROGRESS NOTE     Name:  Tavon Arias  : 1955    Tavon Arias is a 67 year old male who is being evaluated via a billable Video visit.      Telemedicine Visit: The patient's condition can be safely assessed and treated via synchronous audio and visual telemedicine encounter.      Reason for Telemedicine Visit: COVID 19 pandemic and the social and physical recommendations by the CDC and MD., Patient has requested telehealth visit and Patient unable to travel      Originating Site (Patient Location):  Group home    Distant Site (Provider Location): [facility] Outpatient Setting: [facility]    Consent:  The patient/guardian has verbally consented to: the potential risks and benefits of telemedicine (video visit or phone) versus in person care; bill my insurance or make self-payment for services provided; and responsibility for payment of non-covered services.     Mode of Communication:  [employer]     As the provider I attest to compliance with applicable laws and regulations related to telemedicine.     Date of Last Visit: 10/15/24                                      CHIEF COMPLAINT     \"I am doing well\"  HISTORY OF PRESENT ILLNESS     Patient who was last seen in the clinic on 10/15/24 returned today for follow up visit.  Patient who lives in a group home attended visit with the group [place] staff.  Patient reports everything still remained the same as mood, depression, anxiety, and sleep are well under control with current medication regimen.  Patient staff stated patient had a good holiday as they were treated with both [place] and New Year's party at the [facility].  I informed patient and staff the pharmacist suggested discontinuing Cogentin due to medication not helping with any symptoms at this time.  I suggested tapering off Cogentin by taking half tablet which is 0.25 mg daily for 2 weeks then 0.25 mg every other day for 2 weeks then stop.  The staff " verbalized good understanding.  That patient currently denies both suicidal or homicidal ideation.  He also denied both auditory and visual hallucination.  Patient reports no nila or hypomania.  Patient to return to clinic in 3 months for follow-up.  PSYCHIATRIC HISTORY:   History of Psychiatric Hospitalizations:   - Inpatient:  Yes, most recently in 2006  - IOP/PHP/Day treatment: Denies  History of Suicidal Ideation:Accidental overdose on heroin in 2006 and he had anoxic brain  History of Suicide Attempts: Accidental overdose on heroin in 2006 and he had anoxic brain  History of Self-injurious Behavior: Denies a history of SIB.  Current:  No  History of Violence/Aggression: History of Verbal agression  History of Commitment? Denies  Electroconvulsive Therapy (ECT): Denies    PSYCHIATRIC REVIEW OF SYSTEMS:   Psychiatric Review of Systems:   Depression:   Reports: depressed mood, helplessness, irritability, low tolerance  Nila:   Denies: sleeplessness, increased goal-directed activities, abrupt increase in energy pressured speech  Psychosis:   Denies: visual hallucinations, auditory hallucinations, paranoia  Anxiety:   Reports: excessive worries that are difficult to control  PTSD:   Denies: re-experiencing past trauma, nightmares, increased arousal, avoidance of traumatic stimuli, impaired function.  Denies: re-experiencing past trauma, nightmares, increased arousal, avoidance of traumatic stimuli, impaired function.  OCD:   Denies: obsessions, checking, symmetry, cleaning, skin picking.  Eating Disorder:   Denies: restriction, binging, purging.    Sleep:   Denies: Sleep deprivation    MEDICATIONS                                                                                                Current Outpatient Medications   Medication Sig Dispense Refill    Acetaminophen (TYLENOL PO)       albuterol (PROAIR HFA, PROVENTIL HFA, VENTOLIN HFA) 108 (90 BASE) MCG/ACT inhaler Inhale 2 puffs into the lungs every 6 hours       ASPIRIN PO Take 81 mg by mouth daily       benztropine (COGENTIN) 0.5 MG tablet TAKE 1 TABLET BY MOUTH ONCE DAILY 30 tablet 11    bisacodyl (DULCOLAX) 5 MG EC tablet Take 5 mg by mouth daily as needed for constipation      calcium carbonate-vitamin D (OYSTER SHELL CALCIUM/D) 500-200 MG-UNIT tablet Take 1 tablet by mouth daily      Capsaicin 0.1 % cream APPLY A THIN LAYER TO SORE AREAS ON CHEST/BACK THREE TIMES DAILY AS NEEDED FOR PAIN      donepezil (ARICEPT) 10 MG tablet TAKE 1 TABLET BY MOUTH EVERY MORNING 30 tablet 11    gabapentin (NEURONTIN) 100 MG capsule TAKE 2 CAPSULES (200MG) BY MOUTH THREE TIMES DAILY  *1 TOTAL FILL* 186 capsule 11    Ipratropium-Albuterol (COMBIVENT RESPIMAT)  MCG/ACT inhaler Inhale 1 puff into the lungs 4 times daily      ketoconazole (NIZORAL) 2 % cream Apply topically daily      levETIRAcetam (KEPPRA) 100 MG/ML solution Take 10 mg/kg by mouth 2 times daily      LOSARTAN POTASSIUM PO Take 25 mg by mouth daily       magnesium citrate solution Take 296 mLs by mouth once      METFORMIN HCL PO Take 500 mg by mouth 2 times daily (with meals)       multivitamin w/minerals (THERA-VIT-M) tablet Take 1 tablet by mouth daily      OMEPRAZOLE PO Take 20 mg by mouth every morning       oxybutynin (DITROPAN-XL) 5 MG 24 hr tablet Take 5 mg by mouth 2 times daily       polyethylene glycol (MIRALAX/GLYCOLAX) powder Take 1 capful by mouth daily      psyllium (REGULOID) 58.6 % powder Take 6 g by mouth daily      risperiDONE (RISPERDAL) 0.5 MG tablet TAKE 1 TABLET BY MOUTH TWICE DAILY 60 tablet 11    rosuvastatin (CRESTOR) 5 MG tablet Take 1 tablet by mouth daily      senna-docusate (SENOKOT-S;PERICOLACE) 8.6-50 MG per tablet Take 1 tablet by mouth daily      sertraline (ZOLOFT) 100 MG tablet TAKE 1 TABLET BY MOUTH ONCE DAILY ALONG WITH 50MG FOR TOTAL DOSE = 150MG 30 tablet 11    traZODone (DESYREL) 50 MG tablet Take  mg by mouth.      triamcinolone (KENALOG) 0.025 % cream Apply topically 2  "times daily      BENZTROPINE MESYLATE PO Take 0.5 mg by mouth daily      calcium citrate-vitamin D (CITRACAL) 315-250 MG-UNIT TABS Take by mouth daily      Pediatric Multivitamins-Fl (MULTIVITAMIN WITH 1 MG FLUORIDE) 1 MG CHEW Take 1 tablet by mouth daily      rosuvastatin (CRESTOR) 10 MG tablet Take 5 mg by mouth (Patient not taking: Reported on 1/7/2025)      sertraline (ZOLOFT) 50 MG tablet TAKE 1 TABLET BY MOUTH ONCE DAILY ALONG WITH 100MG FOR TOTAL DOSE = 150MG 30 tablet 11    TRAMADOL HCL PO        No current facility-administered medications for this visit.       DRUG MONITORING:  [place] Prescription Monitoring Program evaluating controlled substances in the last year in [place]:  The [place] Prescription Monitoring Program has been reviewed and there are no current concerns with: diversionary activity, early refill requests, and or obtaining the medication from multiple providers      PAST PSYCHOTROPIC MEDICATIONS:  Keppra, Trazodone, citalopam    VITALS   There were no vitals taken for this visit.     BP Readings from Last 1 Encounters:   10/15/19 116/73     Pulse Readings from Last 1 Encounters:   10/15/19 59     Wt Readings from Last 1 Encounters:   03/20/19 83.9 kg (185 lb)     Ht Readings from Last 1 Encounters:   10/15/19 1.778 m (5' 10\")     Estimated body mass index is 26.54 kg/m  as calculated from the following:    Height as of 10/15/19: 1.778 m (5' 10\").    Weight as of 3/20/19: 83.9 kg (185 lb).      PERTINENT HISTORY   PAST MEDICAL HISTORY:   Past Medical History:   Diagnosis Date    Anoxic brain injury (H)     Atrial fibrillation (H)     Cardiac arrest (H)     Cocaine abuse (H)     Cocaine abuse, in remission (H)     Created by Conversion     COPD (chronic obstructive pulmonary disease) (H)     Hepatitis A     Hypertension     Mood disorder (H)     Opioid abuse, in remission (H)     Created by Conversion [facility] Annotation: Aug 11 2008  9:08AM - Trung Harden: HEROIN     Other and unspecified " "alcohol dependence, in remission     Created by Conversion     Type 2 diabetes mellitus without complications (H)        PAST SURGICAL HISTORY:   Past Surgical History:   Procedure Laterality Date    CHOLECYSTECTOMY      COLONOSCOPY  4/22/2014    Procedure: COLONOSCOPY;  Surgeon: Familia Bain MD;  Location:  GI    COLONOSCOPY N/A 3/29/2017    Procedure: COMBINED COLONOSCOPY, SINGLE OR MULTIPLE BIOPSY/POLYPECTOMY BY BIOPSY;  Surgeon: Cassie Martin MD;  Location:  GI    colostomy and takedown      ZZC PART REMOVAL COLON W END COLOSTOMY      Description: Partial Colectomy, End Colostomy & Distal Segment Closure;  Proc Date: 09/27/2003;  Comments: SIGMOID RESECTION AND CROSS POUCH FOR PERF DIVERTICULITIS       FAMILY HISTORY: No family history on file.    SOCIAL HISTORY:   Social History     Tobacco Use    Smoking status: Every Day     Current packs/day: 0.00     Types: Cigarettes     Last attempt to quit: 10/13/2014     Years since quitting: 10.2     Passive exposure: Never    Smokeless tobacco: Former    Tobacco comments:     stopped smoking per staff   Substance Use Topics    Alcohol use: No         Seizures or Head Injury: Reports history of seizure and TBI  History of cardiac disease, rheumatic fever, fainting or dizziness, especially with exercise, seizures, chest pain or shortness of breath with exercise, unexplained change in exercise tolerance, palpitations, high blood pressure, or heart murmur?   No    LABS & IMAGING                                                                                                                  No lab results found.  No lab results found.  No lab results found.    No lab results found.  No results found for: \"JYU507\", \"ZCJT681\", \"NAPS00SGKRF\", \"VITD3\", \"D2VIT\", \"D3VIT\", \"DTOT\", \"CG25568810\", \"[id number]\", \"[id number]\", \"[id number]\", \"[id number]\", \"[id number]\"     ALLERGY & IMMUNIZATIONS       Allergies   Allergen Reactions    Adhesive Tape Swelling    " Latex        FAMILY MEDICAL HISTORY:     Family History       No data available              Family history of sudden or unexplained death or an event requiring resuscitation in children or young adults, cardiac arrhythmias (eg, Abbi-Parkinson-White syndrome), long QT syndrome, catecholaminergic paroxysmal ventricular tachycardia, Brugada syndrome, arrhythmogenic right ventricular dysplasia, hypertrophic cardiomyopathy, dilated cardiomyopathy, or Marfan syndrome?  No    FAMILY PSYCHIATRIC HISTORY:   Psychiatric: Questionable  Suicide attempt: Questionable  Chemical dependency: Positive  Diabetes: Positive    SIGNIFICANT SOCIAL/FAMILY HISTORY:                                           No abuse.    His parents  when he was 20 years old.    He was  and .    He has 2 children.  No contact.    He is on SSDI.  He lives in a group home.    He has conservator for financial decisions    LEGAL: Denies     SUBSTANCE USE HISTORY    Chemical dependency history: Patient had 6 chemical dependency treatments more than 10 years ago  Hx of polysubstance abuse - heroine, cocaine, Alcohol    MEDICAL REVIEW OF SYSTEMS:   Ten system review was completed with pertinent positives noted above    MENTAL STATUS EXAM:   Mental Status Examination (limited due to video virtual visit format):  General:  cooperative  Orientation: Oriented to self, completely disoriented in time and its chronic, partially oriented in place  Speech: Impaired articulation due to TBI  Language: normal naming  Thought process: [place]  Thought content: Denies hallucinations and delusions   Suicidal thoughts: Denies  Homicidal thoughts: Denies  Associations: connected   Affect: irritable due to headache   Mood: depressed   Intellectual functioning: Decreased due to TBI  Memory: Decreased due to TBI   Fund of knowledge: Decreased due to TBI   Attention and concentration: Seems to be at baseline  Gait: I cannot assess it because this is phone  visit, but the staff says that he uses walker at home  Psychomotor activity: Mild agitation in his voice  Muscles: I cannot assess it because this is phone visit  InSight and judgment: Limited       SAFETY   Feels safe in home: Yes   Suicidal ideation: Denies  History of suicide attempts:  Yes   Hx of impulsivity: No     DSM 5 DIAGNOSIS:   1. 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 Alcohol abuse, in remission    5. Cocaine abuse in remission (H)    6. KERA (generalized anxiety disorder)      ASSESSMENT AND PLAN    Patient is a 67 year old,  White Not  or  male with a history of 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  Patient currently residing in an assisted living facility.  Patient reports symptoms stabilization on current medication regimen.  He has not exhibited agitation or irritability since last visit.  Staff confirmed patient has been behaviorally appropriate since last visit.  Per the pharmacist advised, we started tapering off Cogentin due to lack of indications as of the medication is not helping with any symptom at this time.  Patient denies SI, SIB, and HI.  He also denied auditory or visual hallucination.  He reported no yogi or hypomania. The patient, staff and I reviewed the diagnosis and treatment plan. Reviewed risks/benefits of medication with patient. Ongoing education given regarding diagnostic and treatment options with adequate verbalization of understanding. RTC in 3 months    Patient agrees with following recommendations:     Plan:  1.Patient will take the medications as prescribed.   Medications: Zoloft 150 mg qam for depression  Risperdal 0.5 mg two times a day for mood    Cogentin 0.5 mg - TAKE HALF TABLET (0.25 MG) BY MOUTH DAILY FOR 2 WEEKS, THEN TAKE HALF TABLET (0.25 MG) BY MOUTH EVERY OTHER DAY FOR TWO WEEKS THEN STOP.  Aricept  10 mg every am for memory loss  Continue taking gabapentin 200 mg 3 times daily for anxiety and generalized body pain.   Patient will not stop taking medications or adjust them without consulting with the provider.  2.Staff will call with any problems between 2 visits.   3.[facility] staff will take the patient to the emergency room if not feeling safe  or unable to function in the community, or if suicidal, homicidal or hearing voices or having paranoia.   4. [facility] staff will watch his diet and exercise.   5.Patient will see non psychiatric providers for non psychiatric disorders.   6. Next appointment in 2 months  7.  staff will continue to encourage him to use a walker instead a wheelchair  8. I recommend that his sister becomes his gardian due to worsening cognitive abilities and TBI  9. Next appointment in 3 months       CONSULTS/REFERRALS:   Continue therapy  None at this time  Coordinate care with therapist as needed    MEDICAL:   None at this time  Coordinate care with PCP (Trung Harden) as needed  Follow up with primary care provider as planned or for acute medical concerns.    PSYCHOEDUCATION:  Medication side effects and alternatives reviewed. Health promotion activities recommended and reviewed today. All questions addressed. Education and counseling completed regarding risks and benefits of medications and psychotherapy options.  Consent provided by patient/guardian  Call the psychiatric nurse line with medication questions or concerns at [phone].  MyChart may be used to communicate with your provider, but this is not intended to be used for emergencies.  SEROTONIN SYNDROME:  Discussed risks of Serotonin syndrome (ie, serotonin toxicity) which is a potentially life-threatening condition associated with increased serotonergic  activity in the central nervous system (CNS). It is seen with therapeutic medication use, inadvertent interactions between drugs, and intentional self-poisoning. Serotonin syndrome may involve a spectrum of clinical findings, which often include mental status changes, autonomic hyperactivity, and neuromuscular abnormalities.    FIRST GENERATION ANTIPSYCHOTIC/ SECOND GENERATION ANTIPSYCHOTIC USE:  Atypical need for cardiometabolic monitoring with medication- B/P, weight, blood sugar, cholesterol.  Need to monitor for abnormal movements taught  SLEEP HYGIENE: establish a sleep routine, limit screen time 1 hour prior to bed, use bed for sleep only, take sleep/medications on time (including sleepy time tea, trazadone or herbal treatments such as melatonin), aroma therapy, limit caffeine/sugar, yoga, guided imagery, stretch, meditation, limit naps to 20 minutes, make a temperature change in the room, white noise, be mindful of slowing down breathing, take a warm bath/shower, frequently wash sheets, and journaling.   Medlineplus.gov is information for patients.  It is run by the [employer] and it contains information about all disorders, diseases and all medications.      COMMUNITY RESOURCES:    CRISIS NUMBERS: Provided in AVS 4/18/2023  National Suicide Prevention Lifeline: 8-164-522-TALK (834-151-9510)  [employer]/resources for a list of additional resources (SOS)            [facility] - [phone]   Urgent Care Adult Mental Fxlrre-[phone] mobile unit/ 24/7 crisis line  [facility] -[phone]   COPE 24/7 [place] Mobile Team -[phone] (adults)/ [phone] (child)  Poison Control Center - [phone]    OR  go to nearest ER  Crisis Text Line for any crisis 24/7 send this-   To: 225315   [facility] ([facility]) [facility]  [phone]  National Suicide Prevention Lifeline: 196.754.2100 (TTY: 502.599.7920). Call anytime for help.   (www.suicidepreventionlifeline.org)  National [employer] on Mental Illness (www.bell.org): 754-339-8185 or 086-715-9502.   Mental Health Association (www.mentalhealth.org): 194.849.3858 or 664-667-7894.  [place] Crisis Text Line: Text [place] to 390877  Suicide LifeLine Chat: suicidepreNovaSys.org/chat    ADMINISTRATIVE BILLIN min spent interviewing patient, reviewing referral documents, obtaining and reviewing outside records, communication with other health specialists, and preparing this report this day 25    Video/Phone Start Time: 1105  Video/Phone End Time:  1114    Greater than 50% of time was spent in counseling and coordination of care regarding above diagnoses and treatment plan.    The longitudinal plan of care for the diagnosis(es)/condition(s) as documented were addressed during this visit. Due to the added complexity in care, I will continue to support Bill in the subsequent management and with ongoing continuity of care.     Signed:   Marcia Singleton, MSN, APRN, PMHNP-BC  Long Term Outpatient Psychiatry  Chart documentation done in part with Dragon Voice Recognition software.  Although reviewed after completion, some word and grammatical errors may remain.  Answers submitted by the patient for this visit:  Patient Health Questionnaire (Submitted on 2024)  If you checked off any problems, how difficult have these problems made it for you to do your work, take care of things at home, or get along with other people?: Not difficult at all  PHQ9 TOTAL SCORE: 0    Answers submitted by the patient for this visit:  Patient Health Questionnaire (Submitted on 2024)  If you checked off any problems, how difficult have these problems made it for you to do your work, take care of things at home, or get along with other people?: Not difficult at all  PHQ9 TOTAL SCORE: 1

## 2025-01-07 NOTE — PATIENT INSTRUCTIONS
Plan:  1.Patient will take the medications as prescribed.   Medications: Zoloft 150 mg qam for depression  Risperdal 0.5 mg two times a day for mood   Cogentin 0.5 mg - TAKE HALF TABLET (0.25 MG) BY MOUTH DAILY FOR 2 WEEKS, THEN TAKE HALF TABLET (0.25 MG) BY MOUTH EVERY OTHER DAY FOR TWO WEEKS THEN STOP.  Aricept  10 mg every am for memory loss  Continue taking gabapentin 200 mg 3 times daily for anxiety and generalized body pain.   Patient will not stop taking medications or adjust them without consulting with the provider.  2.Staff will call with any problems between 2 visits.   3.shelter staff will take the patient to the emergency room if not feeling safe  or unable to function in the community, or if suicidal, homicidal or hearing voices or having paranoia.   4. shelter staff will watch his diet and exercise.   5.Patient will see non psychiatric providers for non psychiatric disorders.   6. Next appointment in 2 months  7.  staff will continue to encourage him to use a walker instead a wheelchair  8. I recommend that his sister becomes his gardian due to worsening cognitive abilities and TBI  9. Next appointment in 3 months

## 2025-01-07 NOTE — NURSING NOTE
Current patient location: [address] [facility] [address]    Is the patient currently in the state of [place]? YES    Visit mode:VIDEO    If the visit is dropped, the patient can be reconnected by:VIDEO VISIT: Text to cell phone:   Telephone Information:   Mobile [phone]   Mobile Not on file.     Catie is assisting patient today.    Will anyone else be joining the visit? NO  (If patient encounters technical issues they should call [phone])    Are changes needed to the allergy or medication list? Pt stated no changes to allergies and Pt stated no med changes    Are refills needed on medications prescribed by this physician? Discuss with provider    Please refill as needed.    Rooming Documentation:  We completed the phq2.    Reason for visit: RECHECK    Demetrice LACEY

## 2025-01-25 ENCOUNTER — HEALTH MAINTENANCE LETTER (OUTPATIENT)
Age: 70
End: 2025-01-25

## 2025-04-08 ENCOUNTER — VIRTUAL VISIT (OUTPATIENT)
Dept: PSYCHIATRY | Facility: CLINIC | Age: 70
End: 2025-04-08
Payer: COMMERCIAL

## 2025-04-08 DIAGNOSIS — F02.80 DEMENTIA DUE TO HEAD TRAUMA WITHOUT BEHAVIORAL DISTURBANCE (H): Primary | ICD-10-CM

## 2025-04-08 DIAGNOSIS — S09.90XS MOOD DISORDER DUE TO OLD HEAD TRAUMA: ICD-10-CM

## 2025-04-08 DIAGNOSIS — S09.90XS DEMENTIA DUE TO HEAD TRAUMA WITHOUT BEHAVIORAL DISTURBANCE (H): Primary | ICD-10-CM

## 2025-04-08 DIAGNOSIS — F06.30 MOOD DISORDER DUE TO OLD HEAD TRAUMA: ICD-10-CM

## 2025-04-08 DIAGNOSIS — F41.1 GAD (GENERALIZED ANXIETY DISORDER): ICD-10-CM

## 2025-04-08 DIAGNOSIS — F11.11 OPIOID ABUSE, IN REMISSION (H): ICD-10-CM

## 2025-04-08 NOTE — NURSING NOTE
Current patient location: [address] [facility] [address]    Is the patient currently in the state of [place]? YES    Visit mode: VIDEO    If the visit is dropped, the patient can be reconnected by:VIDEO VISIT: Text to cell phone:   Telephone Information:   Mobile [phone]   Mobile Not on file.       Will anyone else be joining the visit? NO  (If patient encounters technical issues they should call [phone])    Are changes needed to the allergy or medication list? Pt stated no changes to allergies and Pt stated no med changes    Are refills needed on medications prescribed by this physician? Discuss with provider    Rooming Documentation:  Pt is unable to fill out qnrs    Reason for visit: RECHVIK LACEY

## 2025-04-08 NOTE — PATIENT INSTRUCTIONS
"Patient Education   The Panel Psych[facility]ry Program  What to Expect  Here's what to expect in the Panel Psychiatry Program.   About the program  You'll be meeting with a psychiatric doctor to check your mental health. A psychiatric doctor helps you deal with troubling thoughts and feelings by giving you medicine. They'll make sure you know the plan for your care. You may see them for a long time. When you're feeling better, they may refer you back to seeing your family doctor.   If you have any questions, we'll be glad to talk to you.  About visits  Be open  At your visits, please talk openly about your problems. It may feel hard, but it's the best way for us to help you.  Cancelling visits  If you can't come to your visit, please call us right away at [phone]. If you don't cancel at least 24 hours (1 full day) before your visit, that's \"late cancellation.\"  Not showing up for your visits  Being very late is the same as not showing up. You'll be a \"no show\" if:  You're more than 15 minutes late for a 30-minute (half hour) visit.  You're more than 30 minutes late for a 60-minute (full hour) visit.  If you cancel late or don't show up 2 times within 6 months, we may end your care.  Getting help between visits  If you need help between visits, you can call us Monday to Friday from 8 a.m. to 4:30 p.m. at [phone].  Emergency care  Call 911 or go to the nearest emergency department if your life or someone else's life is in danger.  Call 988 anytime to reach the national Suicide and Crisis hotline.  Medicine refills  To refill your medicine, call your pharmacy. You can also call [facility]'s [facility] at [phone], Monday to Friday, 8 a.m. to 4:30 p.m. It can take 1 to 3 business days to get a refill.   Forms, letters, and tests  You may have papers to fill out, like FMLA, short-term disability, and workability. We can help you with these forms at your visits, but you must have an " appointment. You may need more than 1 visit for this, to be in an intensive therapy program, or both.  Before we can give you medicine for ADHD, we may refer you to get tested for it or confirm it another way.  We may not be able to give you an emotional support animal letter.  We don't do mental health checks ordered by the court.   We don't do mental health testing, but we can refer you to get tested.   Thank you for choosing us for your care.  For informational purposes only. Not to replace the advice of your health care provider. Copyright   2022 [facility]. All rights reserved. myOrder [id number] - Rev 11/24.   Plan:  1.Patient will take the medications as prescribed.   Medications: Zoloft 150 mg qam for depression  Risperdal 0.5 mg two times a day for mood   Aricept  10 mg every am for memory loss  Continue taking gabapentin 200 mg 3 times daily for anxiety and generalized body pain.   Patient will not stop taking medications or adjust them without consulting with the provider.  2.Staff will call with any problems between 2 visits.   3.assisted staff will take the patient to the emergency room if not feeling safe  or unable to function in the community, or if suicidal, homicidal or hearing voices or having paranoia.   4. assisted staff will watch his diet and exercise.   5.Patient will see non psychiatric providers for non psychiatric disorders.   6. Next appointment in 2 months  7.  staff will continue to encourage him to use a walker instead a wheelchair  8. I recommend that his sister becomes his gardian due to worsening cognitive abilities and TBI  9. Next appointment in 3 months

## 2025-04-08 NOTE — PROGRESS NOTES
"PSYCHIATRIC PROGRESS NOTE     Name:  Tavon Arias  : 1955    Tavon Arias is a 67 year old male who is being evaluated via a billable Video visit.      Telemedicine Visit: The patient's condition can be safely assessed and treated via synchronous audio and visual telemedicine encounter.      Reason for Telemedicine Visit: COVID 19 pandemic and the social and physical recommendations by the CDC and MD., Patient has requested telehealth visit and Patient unable to travel      Originating Site (Patient Location):  [facility]    Distant Site (Provider Location): [facility] Outpatient Setting: [facility]    Consent:  The patient/guardian has verbally consented to: the potential risks and benefits of telemedicine (video visit or phone) versus in person care; bill my insurance or make self-payment for services provided; and responsibility for payment of non-covered services.     Mode of Communication:  [employer] [place] platform     As the provider I attest to compliance with applicable laws and regulations related to telemedicine.     Date of Last Visit: 25                                   CHIEF COMPLAINT     \"I am doing well\"  HISTORY OF PRESENT ILLNESS     Patient who was last seen in the clinic on 25 returned today for follow up visit.  Patient who lives in a group home attended visit with the [facility] staff.  Patient states nothing changed since last time, stating he is doing great with no concern.  Patient states he does not engage in activities as he only wakes up to eat and sleep and then wake up to eat and sleep again.  Patient stated he does not go outside.  The staff reported patient actually engaged in several activities like watching TV and playing bingo during the day.  Staff also stated patient goes to his friend who lives a block from the assisted living as he socializes together every day.  That patient currently denies both suicidal or homicidal ideation.  He also denied " both auditory and visual hallucination.  Patient reports no nila or hypomania.  Patient to return to clinic in 3 months for follow-up.  PSYCHIATRIC HISTORY:   History of Psychiatric Hospitalizations:   - Inpatient:  Yes, most recently in 2006  - IOP/PHP/Day treatment: Denies  History of Suicidal Ideation:Accidental overdose on heroin in 2006 and he had anoxic brain  History of Suicide Attempts: Accidental overdose on heroin in 2006 and he had anoxic brain  History of Self-injurious Behavior: Denies a history of SIB.  Current:  No  History of Violence/Aggression: History of Verbal agression  History of Commitment? Denies  Electroconvulsive Therapy (ECT): Denies    PSYCHIATRIC REVIEW OF SYSTEMS:   Psychiatric Review of Systems:   Depression:   Reports: depressed mood, helplessness, irritability, low tolerance  Nila:   Denies: sleeplessness, increased goal-directed activities, abrupt increase in energy pressured speech  Psychosis:   Denies: visual hallucinations, auditory hallucinations, paranoia  Anxiety:   Reports: excessive worries that are difficult to control  PTSD:   Denies: re-experiencing past trauma, nightmares, increased arousal, avoidance of traumatic stimuli, impaired function.  Denies: re-experiencing past trauma, nightmares, increased arousal, avoidance of traumatic stimuli, impaired function.  OCD:   Denies: obsessions, checking, symmetry, cleaning, skin picking.  Eating Disorder:   Denies: restriction, binging, purging.    Sleep:   Denies: Sleep deprivation    MEDICATIONS                                                                                                Current Outpatient Medications   Medication Sig Dispense Refill    Acetaminophen (TYLENOL PO)       albuterol (PROAIR HFA, PROVENTIL HFA, VENTOLIN HFA) 108 (90 BASE) MCG/ACT inhaler Inhale 2 puffs into the lungs every 6 hours      ASPIRIN PO Take 81 mg by mouth daily       benztropine (COGENTIN) 0.5 MG tablet TAKE HALF TABLET (0.25 MG) BY  MOUTH DAILY FOR 2 WEEKS, THEN TAKE HALF TABLET (0.25 MG) BY MOUTH EVERY OTHER DAY FOR TWO WEEKS THEN STOP. 21 tablet 0    BENZTROPINE MESYLATE PO Take 0.5 mg by mouth daily      bisacodyl (DULCOLAX) 5 MG EC tablet Take 5 mg by mouth daily as needed for constipation      calcium carbonate-vitamin D (OYSTER SHELL CALCIUM/D) 500-200 MG-UNIT tablet Take 1 tablet by mouth daily      calcium citrate-vitamin D (CITRACAL) 315-250 MG-UNIT TABS Take by mouth daily      Capsaicin 0.1 % cream APPLY A THIN LAYER TO SORE AREAS ON CHEST/BACK THREE TIMES DAILY AS NEEDED FOR PAIN      donepezil (ARICEPT) 10 MG tablet TAKE 1 TABLET BY MOUTH EVERY MORNING 30 tablet 11    gabapentin (NEURONTIN) 100 MG capsule TAKE 2 CAPSULES (200MG) BY MOUTH THREE TIMES DAILY 186 capsule 5    Ipratropium-Albuterol (COMBIVENT RESPIMAT)  MCG/ACT inhaler Inhale 1 puff into the lungs 4 times daily      ketoconazole (NIZORAL) 2 % cream Apply topically daily      levETIRAcetam (KEPPRA) 100 MG/ML solution Take 10 mg/kg by mouth 2 times daily      LOSARTAN POTASSIUM PO Take 25 mg by mouth daily       magnesium citrate solution Take 296 mLs by mouth once      METFORMIN HCL PO Take 500 mg by mouth 2 times daily (with meals)       multivitamin w/minerals (THERA-VIT-M) tablet Take 1 tablet by mouth daily      OMEPRAZOLE PO Take 20 mg by mouth every morning       oxybutynin (DITROPAN-XL) 5 MG 24 hr tablet Take 5 mg by mouth 2 times daily       Pediatric Multivitamins-Fl (MULTIVITAMIN WITH 1 MG FLUORIDE) 1 MG CHEW Take 1 tablet by mouth daily      polyethylene glycol (MIRALAX/GLYCOLAX) powder Take 1 capful by mouth daily      psyllium (REGULOID) 58.6 % powder Take 6 g by mouth daily      risperiDONE (RISPERDAL) 0.5 MG tablet TAKE 1 TABLET BY MOUTH TWICE DAILY 60 tablet 11    rosuvastatin (CRESTOR) 10 MG tablet Take 5 mg by mouth (Patient not taking: Reported on 1/7/2025)      rosuvastatin (CRESTOR) 5 MG tablet Take 1 tablet by mouth daily      senna-docusate  "(SENOKOT-S;PERICOLACE) 8.6-50 MG per tablet Take 1 tablet by mouth daily      sertraline (ZOLOFT) 100 MG tablet TAKE 1 TABLET BY MOUTH ONCE DAILY ALONG WITH 50MG FOR TOTAL DOSE = 150MG 30 tablet 11    sertraline (ZOLOFT) 50 MG tablet TAKE 1 TABLET BY MOUTH ONCE DAILY ALONG WITH 100MG FOR TOTAL DOSE = 150MG 30 tablet 11    TRAMADOL HCL PO       traZODone (DESYREL) 50 MG tablet Take  mg by mouth.      triamcinolone (KENALOG) 0.025 % cream Apply topically 2 times daily       No current facility-administered medications for this visit.       DRUG MONITORING:  [place] Prescription Monitoring Program evaluating controlled substances in the last year in [place]:  The [place] Prescription Monitoring Program has been reviewed and there are no current concerns with: diversionary activity, early refill requests, and or obtaining the medication from multiple providers      PAST PSYCHOTROPIC MEDICATIONS:  Keppra, Trazodone, citalopam    VITALS   There were no vitals taken for this visit.     BP Readings from Last 1 Encounters:   10/15/19 116/73     Pulse Readings from Last 1 Encounters:   10/15/19 59     Wt Readings from Last 1 Encounters:   03/20/19 83.9 kg (185 lb)     Ht Readings from Last 1 Encounters:   10/15/19 1.778 m (5' 10\")     Estimated body mass index is 26.54 kg/m  as calculated from the following:    Height as of 10/15/19: 1.778 m (5' 10\").    Weight as of 3/20/19: 83.9 kg (185 lb).      PERTINENT HISTORY   PAST MEDICAL HISTORY:   Past Medical History:   Diagnosis Date    Anoxic brain injury (H)     Atrial fibrillation (H)     Cardiac arrest (H)     Cocaine abuse (H)     Cocaine abuse, in remission (H)     Created by Conversion     COPD (chronic obstructive pulmonary disease) (H)     Hepatitis A     Hypertension     Mood disorder     Opioid abuse, in remission (H)     Created by Conversion [facility] Annotation: Aug 11 2008  9:08AM - Trung Harden: HEROIN     Other and unspecified alcohol dependence, in " "remission     Created by Conversion     Type 2 diabetes mellitus without complications (H)        PAST SURGICAL HISTORY:   Past Surgical History:   Procedure Laterality Date    CHOLECYSTECTOMY      COLONOSCOPY  4/22/2014    Procedure: COLONOSCOPY;  Surgeon: Familia Bain MD;  Location:  GI    COLONOSCOPY N/A 3/29/2017    Procedure: COMBINED COLONOSCOPY, SINGLE OR MULTIPLE BIOPSY/POLYPECTOMY BY BIOPSY;  Surgeon: Cassie Martin MD;  Location:  GI    colostomy and takedown      ZZC PART REMOVAL COLON W END COLOSTOMY      Description: Partial Colectomy, End Colostomy & Distal Segment Closure;  Proc Date: 09/27/2003;  Comments: SIGMOID RESECTION AND CROSS POUCH FOR PERF DIVERTICULITIS       FAMILY HISTORY: No family history on file.    SOCIAL HISTORY:   Social History     Tobacco Use    Smoking status: Every Day     Current packs/day: 0.00     Types: Cigarettes     Last attempt to quit: 10/13/2014     Years since quitting: 10.4     Passive exposure: Never    Smokeless tobacco: Former    Tobacco comments:     stopped smoking per staff   Substance Use Topics    Alcohol use: No         Seizures or Head Injury: Reports history of seizure and TBI  History of cardiac disease, rheumatic fever, fainting or dizziness, especially with exercise, seizures, chest pain or shortness of breath with exercise, unexplained change in exercise tolerance, palpitations, high blood pressure, or heart murmur?   No    LABS & IMAGING                                                                                                                  No lab results found.  No lab results found.  No lab results found.    No lab results found.  No results found for: \"QLW801\", \"UILA886\", \"YKXA43GNPJJ\", \"VITD3\", \"D2VIT\", \"D3VIT\", \"DTOT\", \"[id number]\", \"[id number]\", \"[id number]\", \"[id number]\", \"[id number]\", \"[id number]\"     ALLERGY & IMMUNIZATIONS       Allergies   Allergen Reactions    Adhesive Tape Swelling    Latex        FAMILY " MEDICAL HISTORY:     Family History       No data available              Family history of sudden or unexplained death or an event requiring resuscitation in children or young adults, cardiac arrhythmias (eg, Abbi-Parkinson-White syndrome), long QT syndrome, catecholaminergic paroxysmal ventricular tachycardia, Brugada syndrome, arrhythmogenic right ventricular dysplasia, hypertrophic cardiomyopathy, dilated cardiomyopathy, or Marfan syndrome?  No    FAMILY PSYCHIATRIC HISTORY:   Psychiatric: Questionable  Suicide attempt: Questionable  Chemical dependency: Positive  Diabetes: Positive    SIGNIFICANT SOCIAL/FAMILY HISTORY:                                           No abuse.    His parents  when he was 20 years old.    He was  and .    He has 2 children.  No contact.    He is on SSDI.  He lives in a group home.    He has conservator for financial decisions    LEGAL: Denies     SUBSTANCE USE HISTORY    Chemical dependency history: Patient had 6 chemical dependency treatments more than 10 years ago  Hx of polysubstance abuse - heroine, cocaine, Alcohol    MEDICAL REVIEW OF SYSTEMS:   Ten system review was completed with pertinent positives noted above    MENTAL STATUS EXAM:   Mental Status Examination (limited due to video virtual visit format):  General:  cooperative  Orientation: Oriented to self, completely disoriented in time and its chronic, partially oriented in place  Speech: Impaired articulation due to TBI  Language: normal naming  Thought process: [place]  Thought content: Denies hallucinations and delusions   Suicidal thoughts: Denies  Homicidal thoughts: Denies  Associations: connected   Affect: irritable due to headache   Mood: depressed   Intellectual functioning: Decreased due to TBI  Memory: Decreased due to TBI   Fund of knowledge: Decreased due to TBI   Attention and concentration: Seems to be at baseline  Gait: I cannot assess it because this is phone visit, but the staff  says that he uses walker at home  Psychomotor activity: Mild agitation in his voice  Muscles: I cannot assess it because this is phone visit  InSight and judgment: Limited       SAFETY   Feels safe in home: Yes   Suicidal ideation: Denies  History of suicide attempts:  Yes   Hx of impulsivity: No     DSM 5 DIAGNOSIS:   1. 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 Alcohol abuse, in remission    5. Cocaine abuse in remission (H)    6. KERA (generalized anxiety disorder)      ASSESSMENT AND PLAN    Patient is a 67 year old,  White Not  or  male with a history of 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  Patient currently residing in an assisted living facility.  Patient reports symptoms stabilization on current medication regimen.  He has not exhibited agitation or irritability since last visit.  Staff confirmed patient has been behaviorally appropriate since last visit.  Due to dementia, he usually does not remember most daily activities he engages in and like watching TV, playing games, and hanging out with a friend that lives very close to the assisted living. Patient denies SI, SIB, and HI.  He also denied auditory or visual hallucination.  He reported no yogi or hypomania. The patient, staff and I reviewed the diagnosis and treatment plan. Reviewed risks/benefits of medication with patient. Ongoing education given regarding diagnostic and treatment options with adequate verbalization of understanding. RTC in 3 months    Patient agrees with following recommendations:     Plan:  1.Patient will take the medications as prescribed.   Medications: Zoloft 150 mg qam for depression  Risperdal 0.5 mg two times a day  for mood   Aricept  10 mg every am for memory loss  Continue taking gabapentin 200 mg 3 times daily for anxiety and generalized body pain.   Patient will not stop taking medications or adjust them without consulting with the provider.  2.Staff will call with any problems between 2 visits.   3.alf staff will take the patient to the emergency room if not feeling safe  or unable to function in the community, or if suicidal, homicidal or hearing voices or having paranoia.   4. alf staff will watch his diet and exercise.   5.Patient will see non psychiatric providers for non psychiatric disorders.   6. Next appointment in 2 months  7.  staff will continue to encourage him to use a walker instead a wheelchair  8. I recommend that his sister becomes his gardian due to worsening cognitive abilities and TBI  9. Next appointment in 3 months       CONSULTS/REFERRALS:   Continue therapy  None at this time  Coordinate care with therapist as needed    MEDICAL:   None at this time  Coordinate care with PCP (Trung Harden) as needed  Follow up with primary care provider as planned or for acute medical concerns.    PSYCHOEDUCATION:  Medication side effects and alternatives reviewed. Health promotion activities recommended and reviewed today. All questions addressed. Education and counseling completed regarding risks and benefits of medications and psychotherapy options.  Consent provided by patient/guardian  Call the psychiatric nurse line with medication questions or concerns at [phone].  MyChart may be used to communicate with your provider, but this is not intended to be used for emergencies.  SEROTONIN SYNDROME:  Discussed risks of Serotonin syndrome (ie, serotonin toxicity) which is a potentially life-threatening condition associated with increased serotonergic activity in the central nervous system (CNS). It is seen with therapeutic medication use, inadvertent interactions between drugs, and intentional  self-poisoning. Serotonin syndrome may involve a spectrum of clinical findings, which often include mental status changes, autonomic hyperactivity, and neuromuscular abnormalities.    FIRST GENERATION ANTIPSYCHOTIC/ SECOND GENERATION ANTIPSYCHOTIC USE:  Atypical need for cardiometabolic monitoring with medication- B/P, weight, blood sugar, cholesterol.  Need to monitor for abnormal movements taught  SLEEP HYGIENE: establish a sleep routine, limit screen time 1 hour prior to bed, use bed for sleep only, take sleep/medications on time (including sleepy time tea, trazadone or herbal treatments such as melatonin), aroma therapy, limit caffeine/sugar, yoga, guided imagery, stretch, meditation, limit naps to 20 minutes, make a temperature change in the room, white noise, be mindful of slowing down breathing, take a warm bath/shower, frequently wash sheets, and journaling.   Medlineplus.gov is information for patients.  It is run by the [employer] and it contains information about all disorders, diseases and all medications.      COMMUNITY RESOURCES:    CRISIS NUMBERS: Provided in AVS 4/18/2023  National Suicide Prevention Lifeline: 1-112-249-TALK (386-354-2785)  [employer]/resources for a list of additional resources (SOS)            [facility] - [phone]   Urgent Care Adult Mental [facility]-[phone] mobile unit/ 24/7 crisis line  [facility] -[phone]   COPE 24/7 [facility] Mobile Team -[phone] (adults)/ [phone] (child)  Poison Control Center - 1-193.761.6688    OR  go to nearest ER  Crisis Text Line for any crisis 24/7 send this-   To: 919227   [facility] ([facility]) [facility]  [phone]  National Suicide Prevention Lifeline: 712.846.6150 (TTY: 623.499.2953). Call anytime for help.  (www.suicidepreventionlifeline.org)  National [place] [employer] Mental Illness (www.bell.org): 492.225.9077 or 884-725-5277.   Mental Health Association  (www.mentalhealth.org): 814-324-0294 or 105-020-9575.  [place] Crisis Text Line: Text [place] to 216216  Suicide LifeLine Chat: suicidepreventionlifeline.org/chat    ADMINISTRATIVE BILLIN min spent interviewing patient, reviewing referral documents, obtaining and reviewing outside records, communication with other health specialists, and preparing this report this day 4/6/25    Video/Phone Start Time: 1104  Video/Phone End Time:  1115    Greater than 50% of time was spent in counseling and coordination of care regarding above diagnoses and treatment plan.    The longitudinal plan of care for the diagnosis(es)/condition(s) as documented were addressed during this visit. Due to the added complexity in care, I will continue to support Bill in the subsequent management and with ongoing continuity of care.     Signed:   Marcia Singleton, MSN, APRN, PMHNP-BC  Long Term Outpatient Psychiatry  Chart documentation done in part with Dragon Voice Recognition software.  Although reviewed after completion, some word and grammatical errors may remain.  Answers submitted by the patient for this visit:  Patient Health Questionnaire (Submitted on 2024)  If you checked off any problems, how difficult have these problems made it for you to do your work, take care of things at home, or get along with other people?: Not difficult at all  PHQ9 TOTAL SCORE: 0    Answers submitted by the patient for this visit:  Patient Health Questionnaire (Submitted on 2024)  If you checked off any problems, how difficult have these problems made it for you to do your work, take care of things at home, or get along with other people?: Not difficult at all  PHQ9 TOTAL SCORE: 1

## 2025-04-08 NOTE — PROGRESS NOTES
Virtual Visit Details    Type of service:  Video Visit   Video/Phone Start Time: 1104  Video/Phone End Time:  1115    Originating Location (pt. Location): Assited living    Distant Location (provider location):  Off-site  Platform used for Video Visit: Adrian

## 2025-05-03 ENCOUNTER — HEALTH MAINTENANCE LETTER (OUTPATIENT)
Age: 70
End: 2025-05-03

## 2025-07-08 ENCOUNTER — VIRTUAL VISIT (OUTPATIENT)
Dept: PSYCHIATRY | Facility: CLINIC | Age: 70
End: 2025-07-08
Payer: COMMERCIAL

## 2025-07-08 DIAGNOSIS — S09.90XS DEMENTIA DUE TO HEAD TRAUMA WITHOUT BEHAVIORAL DISTURBANCE (H): ICD-10-CM

## 2025-07-08 DIAGNOSIS — F39 MOOD DISORDER: Primary | ICD-10-CM

## 2025-07-08 DIAGNOSIS — F41.1 GAD (GENERALIZED ANXIETY DISORDER): ICD-10-CM

## 2025-07-08 DIAGNOSIS — F06.0 PSYCHOTIC DISORDER DUE TO MEDICAL CONDITION WITH HALLUCINATIONS: ICD-10-CM

## 2025-07-08 DIAGNOSIS — F02.80 DEMENTIA DUE TO HEAD TRAUMA WITHOUT BEHAVIORAL DISTURBANCE (H): ICD-10-CM

## 2025-07-08 NOTE — PROGRESS NOTES
Virtual Visit Details    Type of service:  Video Visit   Video/Phone Start Time: 1102  Video/Phone End Time:  1113    Originating Location (pt. Location): Assisted Living    Distant Location (provider location):  Off-site  Platform used for Video Visit: Adrian

## 2025-07-08 NOTE — NURSING NOTE
Current patient location: [address] [facility] [address]    Is the patient currently in the state of [place]? YES    Visit mode: VIDEO    If the visit is dropped, the patient can be reconnected by:VIDEO VISIT: Text to cell phone:   Telephone Information:   Mobile [phone]   Mobile Not on file.       Will anyone else be joining the visit? NO  (If patient encounters technical issues they should call [phone]    Are changes needed to the allergy or medication list? Pt stated no changes to allergies and Pt stated no med changes    Are refills needed on medications prescribed by this physician? Discuss with provider    Rooming Documentation:  Grp home stated that pt is unable to complete qnrs    Reason for visit: RECHVIK HERNANDEZF

## 2025-07-08 NOTE — PATIENT INSTRUCTIONS
"Patient Education   The Panel Psychiatry Program  What to Expect  Here's what to expect in the Panel Psychiatry Program.   About the program  You'll be meeting with a psychiatric doctor to check your mental health. A psychiatric doctor helps you deal with troubling thoughts and feelings by giving you medicine. They'll make sure you know the plan for your care. You may see them for a long time. When you're feeling better, they may refer you back to seeing your family doctor.   If you have any questions, we'll be glad to talk to you.  About visits  Be open  At your visits, please talk openly about your problems. It may feel hard, but it's the best way for us to help you.  Cancelling visits  If you can't come to your visit, please call us right away at [phone]. If you don't cancel at least 24 hours (1 full day) before your visit, that's \"late cancellation.\"  Not showing up for your visits  Being very late is the same as not showing up. You'll be a \"no show\" if:  You're more than 15 minutes late for a 30-minute (half hour) visit.  You're more than 30 minutes late for a 60-minute (full hour) visit.  If you cancel late or don't show up 2 times within 6 months, we may end your care.  Getting help between visits  If you need help between visits, you can call us Monday to Friday from 8 a.m. to 4:30 p.m. at [phone].  Emergency care  Call 911 or go to the nearest emergency department if your life or someone else's life is in danger.  Call 988 anytime to reach the national Suicide and Crisis hotline.  Medicine refills  To refill your medicine, call your pharmacy. You can also call [facility]'s Behavioral Access at [phone], Monday to Friday, 8 a.m. to 4:30 p.m. It can take 1 to 3 business days to get a refill.   Forms, letters, and tests  You may have papers to fill out, like FMLA, short-term disability, and workability. We can help you with these forms at your visits, but you must have an " appointment. You may need more than 1 visit for this, to be in an intensive therapy program, or both.  Before we can give you medicine for ADHD, we may refer you to get tested for it or confirm it another way.  We may not be able to give you an emotional support animal letter.  We don't do mental health checks ordered by the court.   We don't do mental health testing, but we can refer you to get tested.   Thank you for choosing us for your care.  For informational purposes only. Not to replace the advice of your health care provider. Copyright   2022 [facility]. All rights reserved. [employer] 220511 - Rev 11/24.     Plan:  1.Patient will take the medications as prescribed.   Medications: Zoloft 150 mg qam for depression  Risperdal 0.5 mg two times a day for mood   Aricept  10 mg every am for memory loss  Continue taking gabapentin 200 mg 3 times daily for anxiety and generalized body pain.   Patient will not stop taking medications or adjust them without consulting with the provider.  2.Staff will call with any problems between 2 visits.   3.jail staff will take the patient to the emergency room if not feeling safe  or unable to function in the community, or if suicidal, homicidal or hearing voices or having paranoia.   4. jail staff will watch his diet and exercise.   5.Patient will see non psychiatric providers for non psychiatric disorders.   6. Next appointment in 2 months  7.  staff will continue to encourage him to use a walker instead a wheelchair  8. I recommend that his sister becomes his gardian due to worsening cognitive abilities and TBI  9. Next appointment in 3 months

## 2025-07-08 NOTE — PROGRESS NOTES
"PSYCHIATRIC PROGRESS NOTE     Name:  Tavon Arias  : 1955    Tavon Arias is a 67 year old male who is being evaluated via a billable Video visit.      Telemedicine Visit: The patient's condition can be safely assessed and treated via synchronous audio and visual telemedicine encounter.      Reason for Telemedicine Visit: COVID 19 pandemic and the social and physical recommendations by the CDC and MD., Patient has requested telehealth visit and Patient unable to travel      Originating Site (Patient Location): [facility] [place]    Distant Site (Provider Location): [facility] Outpatient Setting: [facility]    Consent:  The patient/guardian has verbally consented to: the potential risks and benefits of telemedicine (video visit or phone) versus in person care; bill my insurance or make self-payment for services provided; and responsibility for payment of non-covered services.     Mode of Communication:  Ap[employer] platform     As the provider I attest to compliance with applicable laws and regulations related to telemedicine.     Date of Last Visit: 25                                   CHIEF COMPLAINT     \"I am doing well\"  HISTORY OF PRESENT ILLNESS     Patient who was last seen in the clinic on 25 returned today for follow up visit.  Patient who lives in a group home attended visit with the group [place] staff.  Patient reports he is doing great, stating mood, depression, and anxiety are fairly under control at this time.  When asked about if there is any changes his past 3 months, patient reported there was no change regarding behavior as well as physical and mental health.  Patient's staff stated patient has been going out more like going to the movies while also engaging in painting and reading Bible.  Staff does report patient was brought to hospital at the beginning of where he following fall incident.  The staff reported patient fell off from his wheelchair as it was later discovered " that patient was intoxicated with alcohol.  That patient currently denies both suicidal or homicidal ideation.  He also denied both auditory and visual hallucination.  Patient reports no nila or hypomania.  Patient to return to clinic in 3 months for follow-up.  PSYCHIATRIC HISTORY:   History of Psychiatric Hospitalizations:   - Inpatient: Yes, most recently in 2006  - IOP/PHP/Day treatment: Denies  History of Suicidal Ideation:Accidental overdose on heroin in 2006 and he had anoxic brain  History of Suicide Attempts: Accidental overdose on heroin in 2006 and he had anoxic brain  History of Self-injurious Behavior: Denies a history of SIB.  Current:  No  History of Violence/Aggression: History of Verbal agression  History of Commitment? Denies  Electroconvulsive Therapy (ECT): Denies    PSYCHIATRIC REVIEW OF SYSTEMS:   Psychiatric Review of Systems:   Depression:   Reports: depressed mood, helplessness, irritability, low tolerance  Nila:   Denies: sleeplessness, increased goal-directed activities, abrupt increase in energy pressured speech  Psychosis:   Denies: visual hallucinations, auditory hallucinations, paranoia  Anxiety:   Reports: excessive worries that are difficult to control  PTSD:   Denies: re-experiencing past trauma, nightmares, increased arousal, avoidance of traumatic stimuli, impaired function.  Denies: re-experiencing past trauma, nightmares, increased arousal, avoidance of traumatic stimuli, impaired function.  OCD:   Denies: obsessions, checking, symmetry, cleaning, skin picking.  Eating Disorder:   Denies: restriction, binging, purging.    Sleep:   Denies: Sleep deprivation    MEDICATIONS                                                                                                Current Outpatient Medications   Medication Sig Dispense Refill    Acetaminophen (TYLENOL PO)       albuterol (PROAIR HFA, PROVENTIL HFA, VENTOLIN HFA) 108 (90 BASE) MCG/ACT inhaler Inhale 2 puffs into the lungs  every 6 hours      ASPIRIN PO Take 81 mg by mouth daily       benztropine (COGENTIN) 0.5 MG tablet TAKE HALF TABLET (0.25 MG) BY MOUTH DAILY FOR 2 WEEKS, THEN TAKE HALF TABLET (0.25 MG) BY MOUTH EVERY OTHER DAY FOR TWO WEEKS THEN STOP. 21 tablet 0    BENZTROPINE MESYLATE PO Take 0.5 mg by mouth daily      bisacodyl (DULCOLAX) 5 MG EC tablet Take 5 mg by mouth daily as needed for constipation      calcium carbonate-vitamin D (OYSTER SHELL CALCIUM/D) 500-200 MG-UNIT tablet Take 1 tablet by mouth daily      calcium citrate-vitamin D (CITRACAL) 315-250 MG-UNIT TABS Take by mouth daily      Capsaicin 0.1 % cream APPLY A THIN LAYER TO SORE AREAS ON CHEST/BACK THREE TIMES DAILY AS NEEDED FOR PAIN      donepezil (ARICEPT) 10 MG tablet TAKE 1 TABLET BY MOUTH EVERY MORNING 30 tablet 11    gabapentin (NEURONTIN) 100 MG capsule TAKE 2 CAPSULES (200MG) BY MOUTH THREE TIMES DAILY 186 capsule 5    Ipratropium-Albuterol (COMBIVENT RESPIMAT)  MCG/ACT inhaler Inhale 1 puff into the lungs 4 times daily      ketoconazole (NIZORAL) 2 % cream Apply topically daily      levETIRAcetam (KEPPRA) 100 MG/ML solution Take 10 mg/kg by mouth 2 times daily      LOSARTAN POTASSIUM PO Take 25 mg by mouth daily       magnesium citrate solution Take 296 mLs by mouth once      METFORMIN HCL PO Take 500 mg by mouth 2 times daily (with meals)       multivitamin w/minerals (THERA-VIT-M) tablet Take 1 tablet by mouth daily      OMEPRAZOLE PO Take 20 mg by mouth every morning       oxybutynin (DITROPAN-XL) 5 MG 24 hr tablet Take 5 mg by mouth 2 times daily       Pediatric Multivitamins-Fl (MULTIVITAMIN WITH 1 MG FLUORIDE) 1 MG CHEW Take 1 tablet by mouth daily      polyethylene glycol (MIRALAX/GLYCOLAX) powder Take 1 capful by mouth daily      psyllium (REGULOID) 58.6 % powder Take 6 g by mouth daily      risperiDONE (RISPERDAL) 0.5 MG tablet TAKE 1 TABLET BY MOUTH TWICE DAILY 60 tablet 11    rosuvastatin (CRESTOR) 10 MG tablet Take 5 mg by mouth  "(Patient not taking: Reported on 1/7/2025)      rosuvastatin (CRESTOR) 5 MG tablet Take 1 tablet by mouth daily      senna-docusate (SENOKOT-S;PERICOLACE) 8.6-50 MG per tablet Take 1 tablet by mouth daily      sertraline (ZOLOFT) 100 MG tablet TAKE 1 TABLET BY MOUTH ONCE DAILY ALONG WITH 50MG FOR TOTAL DOSE = 150MG 30 tablet 11    sertraline (ZOLOFT) 50 MG tablet TAKE 1 TABLET BY MOUTH ONCE DAILY ALONG WITH 100MG FOR TOTAL DOSE = 150MG 30 tablet 11    TRAMADOL HCL PO       traZODone (DESYREL) 50 MG tablet Take  mg by mouth.      triamcinolone (KENALOG) 0.025 % cream Apply topically 2 times daily       No current facility-administered medications for this visit.       DRUG MONITORING:  [place] Prescription Monitoring Program evaluating controlled substances in the last year in [place]:  The [place] Prescription Monitoring Program has been reviewed and there are no current concerns with: diversionary activity, early refill requests, and or obtaining the medication from multiple providers      PAST PSYCHOTROPIC MEDICATIONS:  Keppra, Trazodone, citalopam    VITALS   There were no vitals taken for this visit.     BP Readings from Last 1 Encounters:   10/15/19 116/73     Pulse Readings from Last 1 Encounters:   10/15/19 59     Wt Readings from Last 1 Encounters:   03/20/19 83.9 kg (185 lb)     Ht Readings from Last 1 Encounters:   10/15/19 1.778 m (5' 10\")     Estimated body mass index is 26.54 kg/m  as calculated from the following:    Height as of 10/15/19: 1.778 m (5' 10\").    Weight as of 3/20/19: 83.9 kg (185 lb).      PERTINENT HISTORY   PAST MEDICAL HISTORY:   Past Medical History:   Diagnosis Date    Anoxic brain injury (H)     Atrial fibrillation (H)     Cardiac arrest (H)     Cocaine abuse (H)     Cocaine abuse, in remission (H)     Created by Conversion     COPD (chronic obstructive pulmonary disease) (H)     Hepatitis A     Hypertension     Mood disorder     Opioid abuse, in remission (H)     Created by " "[facility] Annotation: Aug 11 2008  9:08AM - Trung Harden: HEROIN     Other and unspecified alcohol dependence, in remission     Created by Conversion     Type 2 diabetes mellitus without complications (H)        PAST SURGICAL HISTORY:   Past Surgical History:   Procedure Laterality Date    CHOLECYSTECTOMY      COLONOSCOPY  4/22/2014    Procedure: COLONOSCOPY;  Surgeon: Familia Bain MD;  Location:  GI    COLONOSCOPY N/A 3/29/2017    Procedure: COMBINED COLONOSCOPY, SINGLE OR MULTIPLE BIOPSY/POLYPECTOMY BY BIOPSY;  Surgeon: Cassie Martin MD;  Location:  GI    colostomy and takedown      ZZC PART REMOVAL COLON W END COLOSTOMY      Description: Partial Colectomy, End Colostomy & Distal Segment Closure;  Proc Date: 09/27/2003;  Comments: SIGMOID RESECTION AND CROSS POUCH FOR PERF DIVERTICULITIS       FAMILY HISTORY: No family history on file.    SOCIAL HISTORY:   Social History     Tobacco Use    Smoking status: Every Day     Current packs/day: 0.00     Types: Cigarettes     Last attempt to quit: 10/13/2014     Years since quitting: 10.7     Passive exposure: Never    Smokeless tobacco: Former    Tobacco comments:     stopped smoking per staff   Substance Use Topics    Alcohol use: No         Seizures or Head Injury: Reports history of seizure and TBI  History of cardiac disease, rheumatic fever, fainting or dizziness, especially with exercise, seizures, chest pain or shortness of breath with exercise, unexplained change in exercise tolerance, palpitations, high blood pressure, or heart murmur?   No    LABS & IMAGING                                                                                                                  No lab results found.  No lab results found.  No lab results found.    No lab results found.  No results found for: \"OLX427\", \"JLZQ249\", \"MHGW75GXYUE\", \"VITD3\", \"D2VIT\", \"D3VIT\", \"DTOT\", \"MA91802212\", \"[id number]\", \"[id number]\", \"[id number]\", \"[id number]\", " "\"[id number]\"     ALLERGY & IMMUNIZATIONS       Allergies   Allergen Reactions    Adhesive Tape Swelling    Latex        FAMILY MEDICAL HISTORY:     Family History       No data available              Family history of sudden or unexplained death or an event requiring resuscitation in children or young adults, cardiac arrhythmias (eg, Abbi-Parkinson-White syndrome), long QT syndrome, catecholaminergic paroxysmal ventricular tachycardia, Brugada syndrome, arrhythmogenic right ventricular dysplasia, hypertrophic cardiomyopathy, dilated cardiomyopathy, or Marfan syndrome?  No    FAMILY PSYCHIATRIC HISTORY:   Psychiatric: Questionable  Suicide attempt: Questionable  Chemical dependency: Positive  Diabetes: Positive    SIGNIFICANT SOCIAL/FAMILY HISTORY:                                           No abuse.    His parents  when he was 20 years old.    He was  and .    He has 2 children.  No contact.    He is on SSDI.  He lives in a group home.    He has conservator for financial decisions    LEGAL: Denies     SUBSTANCE USE HISTORY    Chemical dependency history: Patient had 6 chemical dependency treatments more than 10 years ago  Hx of polysubstance abuse - heroine, cocaine, Alcohol    MEDICAL REVIEW OF SYSTEMS:   Ten system review was completed with pertinent positives noted above    MENTAL STATUS EXAM:   Mental Status Examination (limited due to video virtual visit format):  General:  cooperative  Orientation: Oriented to self, completely disoriented in time and its chronic, partially oriented in place  Speech: Impaired articulation due to TBI  Language: normal naming  Thought process: [place]  Thought content: Denies hallucinations and delusions   Suicidal thoughts: Denies  Homicidal thoughts: Denies  Associations: connected   Affect: irritable due to headache   Mood: depressed   Intellectual functioning: Decreased due to TBI  Memory: Decreased due to TBI   Fund of knowledge: Decreased due to TBI "   Attention and concentration: Seems to be at baseline  Gait: I cannot assess it because this is phone visit, but the staff says that he uses walker at home  Psychomotor activity: Mild agitation in his voice  Muscles: I cannot assess it because this is phone visit  InSight and judgment: Limited       SAFETY   Feels safe in home: Yes   Suicidal ideation: Denies  History of suicide attempts:  Yes   Hx of impulsivity: No     DSM 5 DIAGNOSIS:   1. 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 Alcohol abuse, in remission    5. Cocaine abuse in remission (H)    6. KERA (generalized anxiety disorder)      ASSESSMENT AND PLAN    Patient is a 67 year old,  White Not  or  male with a history of 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  Patient currently residing in an assisted living facility.  [facility] after he fell off on his wheelchair on 1 May 2025 due to alcohol intoxication.  Patient left the group home at the point and did not return as the group staff called the police that led to declaring patient a missing person.  Patient presented as altered mental status in the hospital due to alcohol intoxication.  Patient was treated at the ER.  Patient denies SI, SIB, and HI.  He also denied auditory or visual hallucination.  He reported no yogi or hypomania. The patient, staff and I reviewed the diagnosis and treatment plan. Reviewed risks/benefits of medication with patient. Ongoing education given regarding diagnostic and treatment options with adequate verbalization of understanding. RTC in 3 months    Patient agrees with following recommendations:     Plan:  1.Patient will take the  medications as prescribed.   Medications: Zoloft 150 mg qam for depression  Risperdal 0.5 mg two times a day for mood   Aricept  10 mg every am for memory loss  Continue taking gabapentin 200 mg 3 times daily for anxiety and generalized body pain.   Patient will not stop taking medications or adjust them without consulting with the provider.  2.Staff will call with any problems between 2 visits.   3.senior care staff will take the patient to the emergency room if not feeling safe  or unable to function in the community, or if suicidal, homicidal or hearing voices or having paranoia.   4. senior care staff will watch his diet and exercise.   5.Patient will see non psychiatric providers for non psychiatric disorders.   6. Next appointment in 2 months  7.  staff will continue to encourage him to use a walker instead a wheelchair  8. I recommend that his sister becomes his gardian due to worsening cognitive abilities and TBI  9. Next appointment in 3 months       CONSULTS/REFERRALS:   Continue therapy  None at this time  Coordinate care with therapist as needed    MEDICAL:   None at this time  Coordinate care with PCP (Trung Harden) as needed  Follow up with primary care provider as planned or for acute medical concerns.    PSYCHOEDUCATION:  Medication side effects and alternatives reviewed. Health promotion activities recommended and reviewed today. All questions addressed. Education and counseling completed regarding risks and benefits of medications and psychotherapy options.  Consent provided by patient/guardian  Call the psychiatric nurse line with medication questions or concerns at [phone].  MyChart may be used to communicate with your provider, but this is not intended to be used for emergencies.  SEROTONIN SYNDROME:  Discussed risks of Serotonin syndrome (ie, serotonin toxicity) which is a potentially life-threatening condition associated with increased serotonergic activity in the central nervous system  (CNS). It is seen with therapeutic medication use, inadvertent interactions between drugs, and intentional self-poisoning. Serotonin syndrome may involve a spectrum of clinical findings, which often include mental status changes, autonomic hyperactivity, and neuromuscular abnormalities.    FIRST GENERATION ANTIPSYCHOTIC/ SECOND GENERATION ANTIPSYCHOTIC USE:  Atypical need for cardiometabolic monitoring with medication- B/P, weight, blood sugar, cholesterol.  Need to monitor for abnormal movements taught  SLEEP HYGIENE: establish a sleep routine, limit screen time 1 hour prior to bed, use bed for sleep only, take sleep/medications on time (including sleepy time tea, trazadone or herbal treatments such as melatonin), aroma therapy, limit caffeine/sugar, yoga, guided imagery, stretch, meditation, limit naps to 20 minutes, make a temperature change in the room, white noise, be mindful of slowing down breathing, take a warm bath/shower, frequently wash sheets, and journaling.   Medlineplus.gov is information for patients.  It is run by the [employer] of Medicine and it contains information about all disorders, diseases and all medications.      COMMUNITY RESOURCES:    CRISIS NUMBERS: Provided in AVS 4/18/2023  National Suicide Prevention Lifeline: 3-046-260-TALK (612-297-4113)  [place]/resources for a list of additional resources (SOS)            [facility] - [phone]   Urgent Care Adult Mental Oikqtn-[phone] mobile unit/ 24/7 crisis line  [facility] -[phone]   COPE 24/7 [place] Mobile Team -[phone] (adults)/ [phone] (child)  Poison Control Center - 1-448.584.1954    OR  go to nearest ER  Crisis Text Line for any crisis 24/7 send this-   To: 024028   [facility] ([facility]) [facility]  [phone]  National Suicide Prevention Lifeline: 117.143.6423 (TTY: 758.533.1186). Call anytime for help.  (www.suicidepreventionlifeline.org)  National  [place] [employer] Mental Illness (www.bell.org): 861-533-8566 or 134-986-6519.   Mental Health Association (www.mentalhealth.org): 932.416.2831 or 698-765-8303.  [place] Crisis Text Line: Text [place] to 276370  Suicide LifeLine Chat: suicidepreventionlifeline.org/chat    ADMINISTRATIVE BILLIN min spent interviewing patient, reviewing referral documents, obtaining and reviewing outside records, communication with other health specialists, and preparing this report this day 25    Video/Phone Start Time: 1102  Video/Phone End Time:  1113    Greater than 50% of time was spent in counseling and coordination of care regarding above diagnoses and treatment plan.    The longitudinal plan of care for the diagnosis(es)/condition(s) as documented were addressed during this visit. Due to the added complexity in care, I will continue to support Bill in the subsequent management and with ongoing continuity of care.     Signed:   Marcia Singleton, MSN, APRN, PMHNP-BC  Long Term Outpatient Psychiatry  Chart documentation done in part with Dragon Voice Recognition software.  Although reviewed after completion, some word and grammatical errors may remain.  Answers submitted by the patient for this visit:  Patient Health Questionnaire (Submitted on 2024)  If you checked off any problems, how difficult have these problems made it for you to do your work, take care of things at home, or get along with other people?: Not difficult at all  PHQ9 TOTAL SCORE: 0    Answers submitted by the patient for this visit:  Patient Health Questionnaire (Submitted on 2024)  If you checked off any problems, how difficult have these problems made it for you to do your work, take care of things at home, or get along with other people?: Not difficult at all  PHQ9 TOTAL SCORE: 1

## 2025-08-16 ENCOUNTER — HEALTH MAINTENANCE LETTER (OUTPATIENT)
Age: 70
End: 2025-08-16

## (undated) RX ORDER — FENTANYL CITRATE 50 UG/ML
INJECTION, SOLUTION INTRAMUSCULAR; INTRAVENOUS
Status: DISPENSED
Start: 2017-03-29